# Patient Record
Sex: MALE | Race: WHITE | NOT HISPANIC OR LATINO | Employment: OTHER | URBAN - METROPOLITAN AREA
[De-identification: names, ages, dates, MRNs, and addresses within clinical notes are randomized per-mention and may not be internally consistent; named-entity substitution may affect disease eponyms.]

---

## 2017-02-02 ENCOUNTER — ALLSCRIPTS OFFICE VISIT (OUTPATIENT)
Dept: OTHER | Facility: OTHER | Age: 82
End: 2017-02-02

## 2017-02-02 DIAGNOSIS — E03.9 HYPOTHYROIDISM: ICD-10-CM

## 2017-02-02 DIAGNOSIS — I70.209 ATHEROSCLEROSIS OF NATIVE ARTERY OF EXTREMITY (HCC): ICD-10-CM

## 2017-02-02 DIAGNOSIS — G50.0 TRIGEMINAL NEURALGIA: ICD-10-CM

## 2017-02-02 DIAGNOSIS — D64.9 ANEMIA: ICD-10-CM

## 2017-02-02 DIAGNOSIS — R09.89 OTHER SPECIFIED SYMPTOMS AND SIGNS INVOLVING THE CIRCULATORY AND RESPIRATORY SYSTEMS: ICD-10-CM

## 2017-02-02 DIAGNOSIS — I10 ESSENTIAL (PRIMARY) HYPERTENSION: ICD-10-CM

## 2017-02-02 DIAGNOSIS — R60.9 EDEMA: ICD-10-CM

## 2017-02-02 DIAGNOSIS — Z79.899 OTHER LONG TERM (CURRENT) DRUG THERAPY: ICD-10-CM

## 2017-02-02 DIAGNOSIS — E87.1 HYPO-OSMOLALITY AND HYPONATREMIA: ICD-10-CM

## 2017-02-02 DIAGNOSIS — E11.40 TYPE 2 DIABETES MELLITUS WITH DIABETIC NEUROPATHY (HCC): ICD-10-CM

## 2017-03-01 ENCOUNTER — ALLSCRIPTS OFFICE VISIT (OUTPATIENT)
Dept: OTHER | Facility: OTHER | Age: 82
End: 2017-03-01

## 2017-03-07 ENCOUNTER — ALLSCRIPTS OFFICE VISIT (OUTPATIENT)
Dept: OTHER | Facility: OTHER | Age: 82
End: 2017-03-07

## 2017-03-07 DIAGNOSIS — E03.9 HYPOTHYROIDISM: ICD-10-CM

## 2017-04-13 DIAGNOSIS — E03.9 HYPOTHYROIDISM: ICD-10-CM

## 2017-05-09 ENCOUNTER — ALLSCRIPTS OFFICE VISIT (OUTPATIENT)
Dept: OTHER | Facility: OTHER | Age: 82
End: 2017-05-09

## 2017-05-22 ENCOUNTER — ALLSCRIPTS OFFICE VISIT (OUTPATIENT)
Dept: OTHER | Facility: OTHER | Age: 82
End: 2017-05-22

## 2017-05-22 DIAGNOSIS — E03.9 HYPOTHYROIDISM: ICD-10-CM

## 2017-05-22 DIAGNOSIS — I10 ESSENTIAL (PRIMARY) HYPERTENSION: ICD-10-CM

## 2017-05-22 DIAGNOSIS — D64.9 ANEMIA: ICD-10-CM

## 2017-05-22 DIAGNOSIS — R74.8 ABNORMAL LEVELS OF OTHER SERUM ENZYMES: ICD-10-CM

## 2017-05-22 DIAGNOSIS — N39.46 MIXED INCONTINENCE: ICD-10-CM

## 2017-05-22 DIAGNOSIS — I73.9 PERIPHERAL VASCULAR DISEASE (HCC): ICD-10-CM

## 2017-05-22 DIAGNOSIS — R60.9 EDEMA: ICD-10-CM

## 2017-05-22 DIAGNOSIS — R73.01 IMPAIRED FASTING GLUCOSE: ICD-10-CM

## 2017-05-22 DIAGNOSIS — I70.209 ATHEROSCLEROSIS OF NATIVE ARTERY OF EXTREMITY (HCC): ICD-10-CM

## 2017-05-22 DIAGNOSIS — R15.9 FULL INCONTINENCE OF FECES: ICD-10-CM

## 2017-05-22 DIAGNOSIS — G50.0 TRIGEMINAL NEURALGIA: ICD-10-CM

## 2017-05-31 ENCOUNTER — ALLSCRIPTS OFFICE VISIT (OUTPATIENT)
Dept: OTHER | Facility: OTHER | Age: 82
End: 2017-05-31

## 2017-06-06 ENCOUNTER — GENERIC CONVERSION - ENCOUNTER (OUTPATIENT)
Dept: OTHER | Facility: OTHER | Age: 82
End: 2017-06-06

## 2017-06-06 ENCOUNTER — APPOINTMENT (OUTPATIENT)
Dept: LAB | Facility: CLINIC | Age: 82
End: 2017-06-06
Payer: MEDICARE

## 2017-06-06 DIAGNOSIS — R60.9 EDEMA: ICD-10-CM

## 2017-06-06 DIAGNOSIS — I10 ESSENTIAL (PRIMARY) HYPERTENSION: ICD-10-CM

## 2017-06-06 DIAGNOSIS — G50.0 TRIGEMINAL NEURALGIA: ICD-10-CM

## 2017-06-06 DIAGNOSIS — D64.9 ANEMIA: ICD-10-CM

## 2017-06-06 DIAGNOSIS — R73.01 IMPAIRED FASTING GLUCOSE: ICD-10-CM

## 2017-06-06 DIAGNOSIS — R74.8 ABNORMAL LEVELS OF OTHER SERUM ENZYMES: ICD-10-CM

## 2017-06-06 DIAGNOSIS — N39.46 MIXED INCONTINENCE: ICD-10-CM

## 2017-06-06 DIAGNOSIS — E03.9 HYPOTHYROIDISM: ICD-10-CM

## 2017-06-06 DIAGNOSIS — R15.9 FULL INCONTINENCE OF FECES: ICD-10-CM

## 2017-06-06 DIAGNOSIS — I70.209 ATHEROSCLEROSIS OF NATIVE ARTERY OF EXTREMITY (HCC): ICD-10-CM

## 2017-06-06 DIAGNOSIS — I73.9 PERIPHERAL VASCULAR DISEASE (HCC): ICD-10-CM

## 2017-06-06 LAB
ALBUMIN SERPL BCP-MCNC: 3.9 G/DL (ref 3.5–5)
ALP SERPL-CCNC: 163 U/L (ref 46–116)
ALT SERPL W P-5'-P-CCNC: 24 U/L (ref 12–78)
ANION GAP SERPL CALCULATED.3IONS-SCNC: 6 MMOL/L (ref 4–13)
AST SERPL W P-5'-P-CCNC: 20 U/L (ref 5–45)
BASOPHILS # BLD AUTO: 0.01 THOUSANDS/ΜL (ref 0–0.1)
BASOPHILS NFR BLD AUTO: 0 % (ref 0–1)
BILIRUB SERPL-MCNC: 0.35 MG/DL (ref 0.2–1)
BUN SERPL-MCNC: 33 MG/DL (ref 5–25)
CALCIUM SERPL-MCNC: 8.9 MG/DL (ref 8.3–10.1)
CARBAMAZEPINE SERPL-MCNC: 11.3 UG/ML (ref 4–12)
CHLORIDE SERPL-SCNC: 101 MMOL/L (ref 100–108)
CHOLEST SERPL-MCNC: 198 MG/DL (ref 50–200)
CO2 SERPL-SCNC: 29 MMOL/L (ref 21–32)
CREAT SERPL-MCNC: 1.24 MG/DL (ref 0.6–1.3)
EOSINOPHIL # BLD AUTO: 0.22 THOUSAND/ΜL (ref 0–0.61)
EOSINOPHIL NFR BLD AUTO: 3 % (ref 0–6)
ERYTHROCYTE [DISTWIDTH] IN BLOOD BY AUTOMATED COUNT: 14.1 % (ref 11.6–15.1)
EST. AVERAGE GLUCOSE BLD GHB EST-MCNC: 117 MG/DL
GFR SERPL CREATININE-BSD FRML MDRD: 53.8 ML/MIN/1.73SQ M
GLUCOSE P FAST SERPL-MCNC: 158 MG/DL (ref 65–99)
HBA1C MFR BLD: 5.7 % (ref 4.2–6.3)
HCT VFR BLD AUTO: 36.8 % (ref 36.5–49.3)
HDLC SERPL-MCNC: 51 MG/DL (ref 40–60)
HGB BLD-MCNC: 12.5 G/DL (ref 12–17)
LDLC SERPL CALC-MCNC: 128 MG/DL (ref 0–100)
LYMPHOCYTES # BLD AUTO: 2.35 THOUSANDS/ΜL (ref 0.6–4.47)
LYMPHOCYTES NFR BLD AUTO: 30 % (ref 14–44)
MCH RBC QN AUTO: 30.3 PG (ref 26.8–34.3)
MCHC RBC AUTO-ENTMCNC: 34 G/DL (ref 31.4–37.4)
MCV RBC AUTO: 89 FL (ref 82–98)
MONOCYTES # BLD AUTO: 0.56 THOUSAND/ΜL (ref 0.17–1.22)
MONOCYTES NFR BLD AUTO: 7 % (ref 4–12)
NEUTROPHILS # BLD AUTO: 4.62 THOUSANDS/ΜL (ref 1.85–7.62)
NEUTS SEG NFR BLD AUTO: 60 % (ref 43–75)
NRBC BLD AUTO-RTO: 0 /100 WBCS
PLATELET # BLD AUTO: 124 THOUSANDS/UL (ref 149–390)
PMV BLD AUTO: 11.8 FL (ref 8.9–12.7)
POTASSIUM SERPL-SCNC: 4.8 MMOL/L (ref 3.5–5.3)
PROT SERPL-MCNC: 7.4 G/DL (ref 6.4–8.2)
RBC # BLD AUTO: 4.13 MILLION/UL (ref 3.88–5.62)
SODIUM SERPL-SCNC: 136 MMOL/L (ref 136–145)
T4 FREE SERPL-MCNC: 0.59 NG/DL (ref 0.76–1.46)
TRIGL SERPL-MCNC: 97 MG/DL
TSH SERPL DL<=0.05 MIU/L-ACNC: 6.68 UIU/ML (ref 0.36–3.74)
WBC # BLD AUTO: 7.77 THOUSAND/UL (ref 4.31–10.16)

## 2017-06-06 PROCEDURE — 36415 COLL VENOUS BLD VENIPUNCTURE: CPT

## 2017-06-06 PROCEDURE — 80053 COMPREHEN METABOLIC PANEL: CPT

## 2017-06-06 PROCEDURE — 83036 HEMOGLOBIN GLYCOSYLATED A1C: CPT

## 2017-06-06 PROCEDURE — 85025 COMPLETE CBC W/AUTO DIFF WBC: CPT

## 2017-06-06 PROCEDURE — 84443 ASSAY THYROID STIM HORMONE: CPT

## 2017-06-06 PROCEDURE — 84439 ASSAY OF FREE THYROXINE: CPT

## 2017-06-06 PROCEDURE — 80156 ASSAY CARBAMAZEPINE TOTAL: CPT

## 2017-06-06 PROCEDURE — 80061 LIPID PANEL: CPT

## 2017-06-12 ENCOUNTER — ALLSCRIPTS OFFICE VISIT (OUTPATIENT)
Dept: OTHER | Facility: OTHER | Age: 82
End: 2017-06-12

## 2017-07-11 ENCOUNTER — ALLSCRIPTS OFFICE VISIT (OUTPATIENT)
Dept: OTHER | Facility: OTHER | Age: 82
End: 2017-07-11

## 2017-07-12 ENCOUNTER — APPOINTMENT (OUTPATIENT)
Dept: LAB | Facility: CLINIC | Age: 82
End: 2017-07-12
Payer: MEDICARE

## 2017-07-12 DIAGNOSIS — E03.9 HYPOTHYROIDISM: ICD-10-CM

## 2017-07-12 LAB
T4 FREE SERPL-MCNC: 0.68 NG/DL (ref 0.76–1.46)
TSH SERPL DL<=0.05 MIU/L-ACNC: 3.86 UIU/ML (ref 0.36–3.74)

## 2017-07-12 PROCEDURE — 36415 COLL VENOUS BLD VENIPUNCTURE: CPT

## 2017-07-12 PROCEDURE — 84443 ASSAY THYROID STIM HORMONE: CPT

## 2017-07-12 PROCEDURE — 84439 ASSAY OF FREE THYROXINE: CPT

## 2017-07-13 ENCOUNTER — GENERIC CONVERSION - ENCOUNTER (OUTPATIENT)
Dept: OTHER | Facility: OTHER | Age: 82
End: 2017-07-13

## 2017-08-13 DIAGNOSIS — E03.9 HYPOTHYROIDISM: ICD-10-CM

## 2017-08-23 ENCOUNTER — APPOINTMENT (OUTPATIENT)
Dept: LAB | Facility: CLINIC | Age: 82
End: 2017-08-23
Payer: MEDICARE

## 2017-08-23 ENCOUNTER — GENERIC CONVERSION - ENCOUNTER (OUTPATIENT)
Dept: OTHER | Facility: OTHER | Age: 82
End: 2017-08-23

## 2017-08-23 DIAGNOSIS — E03.9 HYPOTHYROIDISM: ICD-10-CM

## 2017-08-23 LAB
T4 FREE SERPL-MCNC: 0.7 NG/DL (ref 0.76–1.46)
TSH SERPL DL<=0.05 MIU/L-ACNC: 4.71 UIU/ML (ref 0.36–3.74)

## 2017-08-23 PROCEDURE — 36415 COLL VENOUS BLD VENIPUNCTURE: CPT

## 2017-08-23 PROCEDURE — 84443 ASSAY THYROID STIM HORMONE: CPT

## 2017-08-23 PROCEDURE — 84439 ASSAY OF FREE THYROXINE: CPT

## 2017-08-30 ENCOUNTER — APPOINTMENT (EMERGENCY)
Dept: RADIOLOGY | Facility: HOSPITAL | Age: 82
DRG: 981 | End: 2017-08-30
Payer: MEDICARE

## 2017-08-30 ENCOUNTER — HOSPITAL ENCOUNTER (INPATIENT)
Facility: HOSPITAL | Age: 82
LOS: 3 days | Discharge: NURSING, REHAB OR LONG-TERM CARE | DRG: 981 | End: 2017-09-02
Attending: EMERGENCY MEDICINE | Admitting: INTERNAL MEDICINE
Payer: MEDICARE

## 2017-08-30 ENCOUNTER — APPOINTMENT (INPATIENT)
Dept: NON INVASIVE DIAGNOSTICS | Facility: HOSPITAL | Age: 82
DRG: 981 | End: 2017-08-30
Payer: MEDICARE

## 2017-08-30 DIAGNOSIS — S00.432A: ICD-10-CM

## 2017-08-30 DIAGNOSIS — I10 HYPERTENSION: Primary | ICD-10-CM

## 2017-08-30 DIAGNOSIS — S01.01XA SCALP LACERATION: ICD-10-CM

## 2017-08-30 DIAGNOSIS — R29.6 FREQUENT FALLS: ICD-10-CM

## 2017-08-30 DIAGNOSIS — S01.312A: ICD-10-CM

## 2017-08-30 DIAGNOSIS — E87.1 HYPONATREMIA: ICD-10-CM

## 2017-08-30 DIAGNOSIS — R74.8 CARDIAC ENZYMES ELEVATED: ICD-10-CM

## 2017-08-30 DIAGNOSIS — N28.9 RENAL INSUFFICIENCY: ICD-10-CM

## 2017-08-30 LAB
ALBUMIN SERPL BCP-MCNC: 3.7 G/DL (ref 3.5–5)
ALP SERPL-CCNC: 175 U/L (ref 46–116)
ALT SERPL W P-5'-P-CCNC: 30 U/L (ref 12–78)
ANION GAP SERPL CALCULATED.3IONS-SCNC: 8 MMOL/L (ref 4–13)
APTT PPP: 28 SECONDS (ref 23–35)
AST SERPL W P-5'-P-CCNC: 21 U/L (ref 5–45)
BASOPHILS # BLD AUTO: 0 THOUSANDS/ΜL (ref 0–0.1)
BASOPHILS NFR BLD AUTO: 0 % (ref 0–1)
BILIRUB SERPL-MCNC: 0.4 MG/DL (ref 0.2–1)
BILIRUB UR QL STRIP: NEGATIVE
BUN SERPL-MCNC: 26 MG/DL (ref 5–25)
CALCIUM SERPL-MCNC: 8.6 MG/DL (ref 8.3–10.1)
CHLORIDE SERPL-SCNC: 95 MMOL/L (ref 100–108)
CLARITY UR: CLEAR
CO2 SERPL-SCNC: 27 MMOL/L (ref 21–32)
COLOR UR: YELLOW
CREAT SERPL-MCNC: 1.01 MG/DL (ref 0.6–1.3)
EOSINOPHIL # BLD AUTO: 0.1 THOUSAND/ΜL (ref 0–0.61)
EOSINOPHIL NFR BLD AUTO: 2 % (ref 0–6)
ERYTHROCYTE [DISTWIDTH] IN BLOOD BY AUTOMATED COUNT: 13.6 % (ref 11.6–15.1)
GFR SERPL CREATININE-BSD FRML MDRD: 62 ML/MIN/1.73SQ M
GLUCOSE SERPL-MCNC: 167 MG/DL (ref 65–140)
GLUCOSE UR STRIP-MCNC: NEGATIVE MG/DL
HCT VFR BLD AUTO: 39.6 % (ref 42–52)
HGB BLD-MCNC: 13.6 G/DL (ref 14–18)
HGB UR QL STRIP.AUTO: NEGATIVE
INR PPP: 1.08 (ref 0.86–1.16)
KETONES UR STRIP-MCNC: NEGATIVE MG/DL
LEUKOCYTE ESTERASE UR QL STRIP: NEGATIVE
LYMPHOCYTES # BLD AUTO: 1.5 THOUSANDS/ΜL (ref 0.6–4.47)
LYMPHOCYTES NFR BLD AUTO: 20 % (ref 14–44)
MCH RBC QN AUTO: 30.5 PG (ref 27–31)
MCHC RBC AUTO-ENTMCNC: 34.4 G/DL (ref 31.4–37.4)
MCV RBC AUTO: 89 FL (ref 82–98)
MONOCYTES # BLD AUTO: 0.4 THOUSAND/ΜL (ref 0.17–1.22)
MONOCYTES NFR BLD AUTO: 6 % (ref 4–12)
NEUTROPHILS # BLD AUTO: 5.3 THOUSANDS/ΜL (ref 1.85–7.62)
NEUTS SEG NFR BLD AUTO: 72 % (ref 43–75)
NITRITE UR QL STRIP: NEGATIVE
NRBC BLD AUTO-RTO: 0 /100 WBCS
PH UR STRIP.AUTO: 5.5 [PH] (ref 5–9)
PLATELET # BLD AUTO: 133 THOUSANDS/UL (ref 130–400)
PMV BLD AUTO: 7.6 FL (ref 8.9–12.7)
POTASSIUM SERPL-SCNC: 4.8 MMOL/L (ref 3.5–5.3)
PROT SERPL-MCNC: 7.2 G/DL (ref 6.4–8.2)
PROT UR STRIP-MCNC: NEGATIVE MG/DL
PROTHROMBIN TIME: 11.3 SECONDS (ref 9.4–11.7)
RBC # BLD AUTO: 4.48 MILLION/UL (ref 4.7–6.1)
SODIUM SERPL-SCNC: 130 MMOL/L (ref 136–145)
SP GR UR STRIP.AUTO: 1.01 (ref 1–1.03)
TROPONIN I SERPL-MCNC: 0.05 NG/ML
TROPONIN I SERPL-MCNC: 0.05 NG/ML
TROPONIN I SERPL-MCNC: 0.06 NG/ML
TSH SERPL DL<=0.05 MIU/L-ACNC: 3.55 UIU/ML (ref 0.36–3.74)
UROBILINOGEN UR QL STRIP.AUTO: 0.2 E.U./DL
WBC # BLD AUTO: 7.4 THOUSAND/UL (ref 4.8–10.8)

## 2017-08-30 PROCEDURE — 84484 ASSAY OF TROPONIN QUANT: CPT | Performed by: EMERGENCY MEDICINE

## 2017-08-30 PROCEDURE — 0HQ3XZZ REPAIR LEFT EAR SKIN, EXTERNAL APPROACH: ICD-10-PCS | Performed by: EMERGENCY MEDICINE

## 2017-08-30 PROCEDURE — 0JQ00ZZ REPAIR SCALP SUBCUTANEOUS TISSUE AND FASCIA, OPEN APPROACH: ICD-10-PCS | Performed by: EMERGENCY MEDICINE

## 2017-08-30 PROCEDURE — 85730 THROMBOPLASTIN TIME PARTIAL: CPT | Performed by: EMERGENCY MEDICINE

## 2017-08-30 PROCEDURE — 85025 COMPLETE CBC W/AUTO DIFF WBC: CPT | Performed by: EMERGENCY MEDICINE

## 2017-08-30 PROCEDURE — 72125 CT NECK SPINE W/O DYE: CPT

## 2017-08-30 PROCEDURE — G8996 SWALLOW CURRENT STATUS: HCPCS

## 2017-08-30 PROCEDURE — 36415 COLL VENOUS BLD VENIPUNCTURE: CPT | Performed by: EMERGENCY MEDICINE

## 2017-08-30 PROCEDURE — G8997 SWALLOW GOAL STATUS: HCPCS

## 2017-08-30 PROCEDURE — 80053 COMPREHEN METABOLIC PANEL: CPT | Performed by: EMERGENCY MEDICINE

## 2017-08-30 PROCEDURE — 92610 EVALUATE SWALLOWING FUNCTION: CPT

## 2017-08-30 PROCEDURE — 93005 ELECTROCARDIOGRAM TRACING: CPT | Performed by: EMERGENCY MEDICINE

## 2017-08-30 PROCEDURE — 81003 URINALYSIS AUTO W/O SCOPE: CPT | Performed by: EMERGENCY MEDICINE

## 2017-08-30 PROCEDURE — 87081 CULTURE SCREEN ONLY: CPT | Performed by: INTERNAL MEDICINE

## 2017-08-30 PROCEDURE — 84484 ASSAY OF TROPONIN QUANT: CPT | Performed by: INTERNAL MEDICINE

## 2017-08-30 PROCEDURE — 84443 ASSAY THYROID STIM HORMONE: CPT | Performed by: EMERGENCY MEDICINE

## 2017-08-30 PROCEDURE — 99285 EMERGENCY DEPT VISIT HI MDM: CPT

## 2017-08-30 PROCEDURE — 85610 PROTHROMBIN TIME: CPT | Performed by: EMERGENCY MEDICINE

## 2017-08-30 PROCEDURE — 70450 CT HEAD/BRAIN W/O DYE: CPT

## 2017-08-30 RX ORDER — ONDANSETRON 2 MG/ML
4 INJECTION INTRAMUSCULAR; INTRAVENOUS EVERY 6 HOURS PRN
Status: DISCONTINUED | OUTPATIENT
Start: 2017-08-30 | End: 2017-09-02 | Stop reason: HOSPADM

## 2017-08-30 RX ORDER — ASPIRIN 81 MG/1
81 TABLET, CHEWABLE ORAL DAILY
Status: DISCONTINUED | OUTPATIENT
Start: 2017-08-30 | End: 2017-09-02 | Stop reason: HOSPADM

## 2017-08-30 RX ORDER — LISINOPRIL 5 MG/1
5 TABLET ORAL
Status: DISCONTINUED | OUTPATIENT
Start: 2017-08-30 | End: 2017-09-02 | Stop reason: HOSPADM

## 2017-08-30 RX ORDER — ACETAMINOPHEN 325 MG/1
650 TABLET ORAL EVERY 6 HOURS PRN
Status: DISCONTINUED | OUTPATIENT
Start: 2017-08-30 | End: 2017-09-02 | Stop reason: HOSPADM

## 2017-08-30 RX ORDER — ASPIRIN 81 MG/1
TABLET, CHEWABLE ORAL
COMMUNITY

## 2017-08-30 RX ORDER — AMLODIPINE BESYLATE 5 MG/1
5 TABLET ORAL EVERY MORNING
COMMUNITY

## 2017-08-30 RX ORDER — METOPROLOL SUCCINATE 100 MG/1
100 TABLET, EXTENDED RELEASE ORAL DAILY
Status: DISCONTINUED | OUTPATIENT
Start: 2017-08-31 | End: 2017-09-02 | Stop reason: HOSPADM

## 2017-08-30 RX ORDER — METOPROLOL SUCCINATE 100 MG/1
TABLET, EXTENDED RELEASE ORAL
COMMUNITY
Start: 2017-02-02

## 2017-08-30 RX ORDER — AMLODIPINE BESYLATE 5 MG/1
5 TABLET ORAL DAILY
Status: DISCONTINUED | OUTPATIENT
Start: 2017-08-30 | End: 2017-09-02 | Stop reason: HOSPADM

## 2017-08-30 RX ORDER — METOPROLOL SUCCINATE 100 MG/1
100 TABLET, EXTENDED RELEASE ORAL ONCE
Status: COMPLETED | OUTPATIENT
Start: 2017-08-30 | End: 2017-08-30

## 2017-08-30 RX ORDER — HEPARIN SODIUM 5000 [USP'U]/ML
5000 INJECTION, SOLUTION INTRAVENOUS; SUBCUTANEOUS EVERY 12 HOURS SCHEDULED
Status: DISCONTINUED | OUTPATIENT
Start: 2017-08-30 | End: 2017-09-02 | Stop reason: HOSPADM

## 2017-08-30 RX ORDER — CARBAMAZEPINE 200 MG/1
300 TABLET ORAL EVERY 8 HOURS SCHEDULED
Status: DISCONTINUED | OUTPATIENT
Start: 2017-08-30 | End: 2017-09-02 | Stop reason: HOSPADM

## 2017-08-30 RX ORDER — LEVOTHYROXINE SODIUM 137 UG/1
0.07 TABLET ORAL
COMMUNITY
End: 2018-07-24

## 2017-08-30 RX ORDER — POLYETHYLENE GLYCOL 3350 17 G/17G
17 POWDER, FOR SOLUTION ORAL DAILY PRN
Status: DISCONTINUED | OUTPATIENT
Start: 2017-08-30 | End: 2017-09-02 | Stop reason: HOSPADM

## 2017-08-30 RX ADMIN — HEPARIN SODIUM 5000 UNITS: 5000 INJECTION, SOLUTION INTRAVENOUS; SUBCUTANEOUS at 10:40

## 2017-08-30 RX ADMIN — LISINOPRIL 5 MG: 5 TABLET ORAL at 22:16

## 2017-08-30 RX ADMIN — ASPIRIN 81 MG 81 MG: 81 TABLET ORAL at 12:17

## 2017-08-30 RX ADMIN — Medication 1 APPLICATION: at 06:39

## 2017-08-30 RX ADMIN — METOPROLOL SUCCINATE 100 MG: 100 TABLET, FILM COATED, EXTENDED RELEASE ORAL at 08:07

## 2017-08-30 RX ADMIN — HEPARIN SODIUM 5000 UNITS: 5000 INJECTION, SOLUTION INTRAVENOUS; SUBCUTANEOUS at 22:16

## 2017-08-30 RX ADMIN — CARBAMAZEPINE 300 MG: 200 TABLET ORAL at 14:09

## 2017-08-30 RX ADMIN — CARBAMAZEPINE 300 MG: 200 TABLET ORAL at 22:16

## 2017-08-30 RX ADMIN — AMLODIPINE BESYLATE 5 MG: 5 TABLET ORAL at 10:40

## 2017-08-31 ENCOUNTER — APPOINTMENT (INPATIENT)
Dept: OCCUPATIONAL THERAPY | Facility: HOSPITAL | Age: 82
DRG: 981 | End: 2017-08-31
Payer: MEDICARE

## 2017-08-31 ENCOUNTER — APPOINTMENT (INPATIENT)
Dept: NON INVASIVE DIAGNOSTICS | Facility: HOSPITAL | Age: 82
DRG: 981 | End: 2017-08-31
Payer: MEDICARE

## 2017-08-31 LAB
ALBUMIN SERPL BCP-MCNC: 3.6 G/DL (ref 3.5–5)
ALP SERPL-CCNC: 167 U/L (ref 46–116)
ALT SERPL W P-5'-P-CCNC: 25 U/L (ref 12–78)
ANION GAP SERPL CALCULATED.3IONS-SCNC: 9 MMOL/L (ref 4–13)
AST SERPL W P-5'-P-CCNC: 18 U/L (ref 5–45)
ATRIAL RATE: 70 BPM
BASOPHILS # BLD AUTO: 0 THOUSANDS/ΜL (ref 0–0.1)
BASOPHILS NFR BLD AUTO: 0 % (ref 0–1)
BILIRUB SERPL-MCNC: 0.5 MG/DL (ref 0.2–1)
BUN SERPL-MCNC: 26 MG/DL (ref 5–25)
CALCIUM SERPL-MCNC: 8.4 MG/DL (ref 8.3–10.1)
CHLORIDE SERPL-SCNC: 94 MMOL/L (ref 100–108)
CHOLEST SERPL-MCNC: 211 MG/DL (ref 50–200)
CO2 SERPL-SCNC: 25 MMOL/L (ref 21–32)
CREAT SERPL-MCNC: 0.99 MG/DL (ref 0.6–1.3)
EOSINOPHIL # BLD AUTO: 0.2 THOUSAND/ΜL (ref 0–0.61)
EOSINOPHIL NFR BLD AUTO: 3 % (ref 0–6)
ERYTHROCYTE [DISTWIDTH] IN BLOOD BY AUTOMATED COUNT: 13.7 % (ref 11.6–15.1)
GFR SERPL CREATININE-BSD FRML MDRD: 63 ML/MIN/1.73SQ M
GLUCOSE SERPL-MCNC: 162 MG/DL (ref 65–140)
HCT VFR BLD AUTO: 39.2 % (ref 42–52)
HDLC SERPL-MCNC: 53 MG/DL (ref 40–60)
HGB BLD-MCNC: 13.3 G/DL (ref 14–18)
LDLC SERPL CALC-MCNC: 133 MG/DL (ref 0–100)
LYMPHOCYTES # BLD AUTO: 2 THOUSANDS/ΜL (ref 0.6–4.47)
LYMPHOCYTES NFR BLD AUTO: 26 % (ref 14–44)
MAGNESIUM SERPL-MCNC: 2 MG/DL (ref 1.6–2.6)
MCH RBC QN AUTO: 30.5 PG (ref 27–31)
MCHC RBC AUTO-ENTMCNC: 34 G/DL (ref 31.4–37.4)
MCV RBC AUTO: 89 FL (ref 82–98)
MONOCYTES # BLD AUTO: 0.4 THOUSAND/ΜL (ref 0.17–1.22)
MONOCYTES NFR BLD AUTO: 6 % (ref 4–12)
MRSA NOSE QL CULT: NORMAL
NEUTROPHILS # BLD AUTO: 4.9 THOUSANDS/ΜL (ref 1.85–7.62)
NEUTS SEG NFR BLD AUTO: 65 % (ref 43–75)
NRBC BLD AUTO-RTO: 0 /100 WBCS
OSMOLALITY UR: 455 MMOL/KG
PLATELET # BLD AUTO: 137 THOUSANDS/UL (ref 130–400)
PMV BLD AUTO: 9.4 FL (ref 8.9–12.7)
POTASSIUM SERPL-SCNC: 4.8 MMOL/L (ref 3.5–5.3)
PR INTERVAL: 242 MS
PROT SERPL-MCNC: 7 G/DL (ref 6.4–8.2)
QRS AXIS: -71 DEGREES
QRSD INTERVAL: 180 MS
QT INTERVAL: 496 MS
QTC INTERVAL: 535 MS
RBC # BLD AUTO: 4.38 MILLION/UL (ref 4.7–6.1)
SODIUM 24H UR-SCNC: 65 MOL/L
SODIUM SERPL-SCNC: 128 MMOL/L (ref 136–145)
T WAVE AXIS: 110 DEGREES
TRIGL SERPL-MCNC: 124 MG/DL
VENTRICULAR RATE: 70 BPM
VIT B12 SERPL-MCNC: 930 PG/ML (ref 100–900)
WBC # BLD AUTO: 7.5 THOUSAND/UL (ref 4.8–10.8)

## 2017-08-31 PROCEDURE — 84300 ASSAY OF URINE SODIUM: CPT | Performed by: INTERNAL MEDICINE

## 2017-08-31 PROCEDURE — 80061 LIPID PANEL: CPT | Performed by: INTERNAL MEDICINE

## 2017-08-31 PROCEDURE — G8979 MOBILITY GOAL STATUS: HCPCS

## 2017-08-31 PROCEDURE — 83935 ASSAY OF URINE OSMOLALITY: CPT | Performed by: INTERNAL MEDICINE

## 2017-08-31 PROCEDURE — 82607 VITAMIN B-12: CPT | Performed by: INTERNAL MEDICINE

## 2017-08-31 PROCEDURE — G8978 MOBILITY CURRENT STATUS: HCPCS

## 2017-08-31 PROCEDURE — 97167 OT EVAL HIGH COMPLEX 60 MIN: CPT

## 2017-08-31 PROCEDURE — 80157 ASSAY CARBAMAZEPINE FREE: CPT | Performed by: INTERNAL MEDICINE

## 2017-08-31 PROCEDURE — 80053 COMPREHEN METABOLIC PANEL: CPT | Performed by: INTERNAL MEDICINE

## 2017-08-31 PROCEDURE — 85025 COMPLETE CBC W/AUTO DIFF WBC: CPT | Performed by: INTERNAL MEDICINE

## 2017-08-31 PROCEDURE — 97163 PT EVAL HIGH COMPLEX 45 MIN: CPT

## 2017-08-31 PROCEDURE — G8988 SELF CARE GOAL STATUS: HCPCS

## 2017-08-31 PROCEDURE — G8987 SELF CARE CURRENT STATUS: HCPCS

## 2017-08-31 PROCEDURE — 83735 ASSAY OF MAGNESIUM: CPT | Performed by: INTERNAL MEDICINE

## 2017-08-31 PROCEDURE — 93306 TTE W/DOPPLER COMPLETE: CPT

## 2017-08-31 RX ADMIN — LEVOTHYROXINE SODIUM 137 MCG: 112 TABLET ORAL at 06:58

## 2017-08-31 RX ADMIN — HEPARIN SODIUM 5000 UNITS: 5000 INJECTION, SOLUTION INTRAVENOUS; SUBCUTANEOUS at 21:49

## 2017-08-31 RX ADMIN — CARBAMAZEPINE 300 MG: 200 TABLET ORAL at 21:49

## 2017-08-31 RX ADMIN — ASPIRIN 81 MG 81 MG: 81 TABLET ORAL at 08:32

## 2017-08-31 RX ADMIN — METOPROLOL SUCCINATE 100 MG: 100 TABLET, FILM COATED, EXTENDED RELEASE ORAL at 08:32

## 2017-08-31 RX ADMIN — AMLODIPINE BESYLATE 5 MG: 5 TABLET ORAL at 08:31

## 2017-08-31 RX ADMIN — LISINOPRIL 5 MG: 5 TABLET ORAL at 21:49

## 2017-08-31 RX ADMIN — HEPARIN SODIUM 5000 UNITS: 5000 INJECTION, SOLUTION INTRAVENOUS; SUBCUTANEOUS at 08:32

## 2017-08-31 RX ADMIN — CARBAMAZEPINE 300 MG: 200 TABLET ORAL at 06:56

## 2017-08-31 RX ADMIN — CARBAMAZEPINE 300 MG: 200 TABLET ORAL at 14:43

## 2017-09-01 LAB
ANION GAP SERPL CALCULATED.3IONS-SCNC: 9 MMOL/L (ref 4–13)
BUN SERPL-MCNC: 28 MG/DL (ref 5–25)
CALCIUM SERPL-MCNC: 8.4 MG/DL (ref 8.3–10.1)
CARBAMAZEPINE FREE SERPL-MCNC: 2.5 UG/ML (ref 0.6–4.2)
CHLORIDE SERPL-SCNC: 94 MMOL/L (ref 100–108)
CO2 SERPL-SCNC: 25 MMOL/L (ref 21–32)
CORTIS SERPL-MCNC: 21.3 UG/DL
CREAT SERPL-MCNC: 0.94 MG/DL (ref 0.6–1.3)
GFR SERPL CREATININE-BSD FRML MDRD: 67 ML/MIN/1.73SQ M
GLUCOSE SERPL-MCNC: 128 MG/DL (ref 65–140)
OSMOLALITY UR/SERPL-RTO: 278 MMOL/KG (ref 282–298)
POTASSIUM SERPL-SCNC: 4.6 MMOL/L (ref 3.5–5.3)
SODIUM SERPL-SCNC: 128 MMOL/L (ref 136–145)
URATE SERPL-MCNC: 5.4 MG/DL (ref 4.2–8)

## 2017-09-01 PROCEDURE — 83930 ASSAY OF BLOOD OSMOLALITY: CPT | Performed by: INTERNAL MEDICINE

## 2017-09-01 PROCEDURE — 80048 BASIC METABOLIC PNL TOTAL CA: CPT | Performed by: INTERNAL MEDICINE

## 2017-09-01 PROCEDURE — 82533 TOTAL CORTISOL: CPT | Performed by: INTERNAL MEDICINE

## 2017-09-01 PROCEDURE — 84550 ASSAY OF BLOOD/URIC ACID: CPT | Performed by: INTERNAL MEDICINE

## 2017-09-01 RX ORDER — SODIUM CHLORIDE 1000 MG
1 TABLET, SOLUBLE MISCELLANEOUS
Status: DISCONTINUED | OUTPATIENT
Start: 2017-09-01 | End: 2017-09-02

## 2017-09-01 RX ORDER — TORSEMIDE 10 MG/1
10 TABLET ORAL DAILY
Status: DISCONTINUED | OUTPATIENT
Start: 2017-09-01 | End: 2017-09-02 | Stop reason: HOSPADM

## 2017-09-01 RX ADMIN — AMLODIPINE BESYLATE 5 MG: 5 TABLET ORAL at 09:16

## 2017-09-01 RX ADMIN — CARBAMAZEPINE 300 MG: 200 TABLET ORAL at 14:42

## 2017-09-01 RX ADMIN — TORSEMIDE 10 MG: 10 TABLET ORAL at 11:44

## 2017-09-01 RX ADMIN — HEPARIN SODIUM 5000 UNITS: 5000 INJECTION, SOLUTION INTRAVENOUS; SUBCUTANEOUS at 09:16

## 2017-09-01 RX ADMIN — CARBAMAZEPINE 300 MG: 200 TABLET ORAL at 21:49

## 2017-09-01 RX ADMIN — ASPIRIN 81 MG 81 MG: 81 TABLET ORAL at 09:16

## 2017-09-01 RX ADMIN — SODIUM CHLORIDE TAB 1 GM 1 G: 1 TAB at 11:44

## 2017-09-01 RX ADMIN — SODIUM CHLORIDE TAB 1 GM 1 G: 1 TAB at 16:53

## 2017-09-01 RX ADMIN — LEVOTHYROXINE SODIUM 137 MCG: 112 TABLET ORAL at 06:26

## 2017-09-01 RX ADMIN — METOPROLOL SUCCINATE 100 MG: 100 TABLET, FILM COATED, EXTENDED RELEASE ORAL at 09:16

## 2017-09-01 RX ADMIN — HEPARIN SODIUM 5000 UNITS: 5000 INJECTION, SOLUTION INTRAVENOUS; SUBCUTANEOUS at 21:49

## 2017-09-01 RX ADMIN — LISINOPRIL 5 MG: 5 TABLET ORAL at 21:47

## 2017-09-01 RX ADMIN — CARBAMAZEPINE 300 MG: 200 TABLET ORAL at 06:26

## 2017-09-02 VITALS
WEIGHT: 134.5 LBS | BODY MASS INDEX: 24.75 KG/M2 | OXYGEN SATURATION: 96 % | HEIGHT: 62 IN | TEMPERATURE: 97.5 F | DIASTOLIC BLOOD PRESSURE: 76 MMHG | RESPIRATION RATE: 18 BRPM | SYSTOLIC BLOOD PRESSURE: 168 MMHG | HEART RATE: 79 BPM

## 2017-09-02 LAB
ANION GAP SERPL CALCULATED.3IONS-SCNC: 9 MMOL/L (ref 4–13)
BUN SERPL-MCNC: 35 MG/DL (ref 5–25)
CALCIUM SERPL-MCNC: 8.5 MG/DL (ref 8.3–10.1)
CHLORIDE SERPL-SCNC: 96 MMOL/L (ref 100–108)
CO2 SERPL-SCNC: 27 MMOL/L (ref 21–32)
CREAT SERPL-MCNC: 1.13 MG/DL (ref 0.6–1.3)
GFR SERPL CREATININE-BSD FRML MDRD: 54 ML/MIN/1.73SQ M
GLUCOSE SERPL-MCNC: 130 MG/DL (ref 65–140)
POTASSIUM SERPL-SCNC: 4.4 MMOL/L (ref 3.5–5.3)
SODIUM SERPL-SCNC: 132 MMOL/L (ref 136–145)

## 2017-09-02 PROCEDURE — 80048 BASIC METABOLIC PNL TOTAL CA: CPT | Performed by: INTERNAL MEDICINE

## 2017-09-02 RX ORDER — TORSEMIDE 10 MG/1
10 TABLET ORAL DAILY
Refills: 0
Start: 2017-09-02 | End: 2017-11-23

## 2017-09-02 RX ORDER — POLYETHYLENE GLYCOL 3350 17 G/17G
17 POWDER, FOR SOLUTION ORAL DAILY PRN
Qty: 14 EACH | Refills: 0
Start: 2017-09-02

## 2017-09-02 RX ORDER — SODIUM CHLORIDE 1000 MG
1 TABLET, SOLUBLE MISCELLANEOUS 2 TIMES DAILY WITH MEALS
Status: DISCONTINUED | OUTPATIENT
Start: 2017-09-02 | End: 2017-09-02 | Stop reason: HOSPADM

## 2017-09-02 RX ORDER — SODIUM CHLORIDE 1000 MG
1 TABLET, SOLUBLE MISCELLANEOUS 2 TIMES DAILY WITH MEALS
Refills: 0
Start: 2017-09-02 | End: 2017-11-23

## 2017-09-02 RX ADMIN — METOPROLOL SUCCINATE 100 MG: 100 TABLET, FILM COATED, EXTENDED RELEASE ORAL at 08:57

## 2017-09-02 RX ADMIN — TORSEMIDE 10 MG: 10 TABLET ORAL at 08:57

## 2017-09-02 RX ADMIN — SODIUM CHLORIDE TAB 1 GM 1 G: 1 TAB at 08:57

## 2017-09-02 RX ADMIN — HEPARIN SODIUM 5000 UNITS: 5000 INJECTION, SOLUTION INTRAVENOUS; SUBCUTANEOUS at 08:57

## 2017-09-02 RX ADMIN — CARBAMAZEPINE 300 MG: 200 TABLET ORAL at 05:27

## 2017-09-02 RX ADMIN — LEVOTHYROXINE SODIUM 137 MCG: 112 TABLET ORAL at 05:27

## 2017-09-02 RX ADMIN — AMLODIPINE BESYLATE 5 MG: 5 TABLET ORAL at 08:56

## 2017-09-02 RX ADMIN — ASPIRIN 81 MG 81 MG: 81 TABLET ORAL at 08:57

## 2017-09-05 ENCOUNTER — GENERIC CONVERSION - ENCOUNTER (OUTPATIENT)
Dept: OTHER | Facility: OTHER | Age: 82
End: 2017-09-05

## 2017-09-22 ENCOUNTER — GENERIC CONVERSION - ENCOUNTER (OUTPATIENT)
Dept: OTHER | Facility: OTHER | Age: 82
End: 2017-09-22

## 2017-11-23 ENCOUNTER — HOSPITAL ENCOUNTER (INPATIENT)
Facility: HOSPITAL | Age: 82
LOS: 6 days | Discharge: NURSING, REHAB OR LONG-TERM CARE | DRG: 480 | End: 2017-11-29
Attending: EMERGENCY MEDICINE | Admitting: INTERNAL MEDICINE
Payer: MEDICARE

## 2017-11-23 ENCOUNTER — APPOINTMENT (INPATIENT)
Dept: RADIOLOGY | Facility: HOSPITAL | Age: 82
DRG: 480 | End: 2017-11-23
Payer: MEDICARE

## 2017-11-23 ENCOUNTER — APPOINTMENT (EMERGENCY)
Dept: RADIOLOGY | Facility: HOSPITAL | Age: 82
DRG: 480 | End: 2017-11-23
Payer: MEDICARE

## 2017-11-23 DIAGNOSIS — S72.001A CLOSED FRACTURE OF RIGHT HIP, INITIAL ENCOUNTER (HCC): Primary | ICD-10-CM

## 2017-11-23 PROBLEM — M79.606 LEG PAIN: Status: ACTIVE | Noted: 2017-11-23

## 2017-11-23 LAB
ANION GAP SERPL CALCULATED.3IONS-SCNC: 9 MMOL/L (ref 4–13)
APTT PPP: 24 SECONDS (ref 24–33)
BASOPHILS # BLD AUTO: 0 THOUSANDS/ΜL (ref 0–0.1)
BASOPHILS NFR BLD AUTO: 0 % (ref 0–1)
BUN SERPL-MCNC: 33 MG/DL (ref 5–25)
CALCIUM SERPL-MCNC: 8.7 MG/DL (ref 8.3–10.1)
CHLORIDE SERPL-SCNC: 101 MMOL/L (ref 100–108)
CO2 SERPL-SCNC: 28 MMOL/L (ref 21–32)
CREAT SERPL-MCNC: 1.33 MG/DL (ref 0.6–1.3)
EOSINOPHIL # BLD AUTO: 0.3 THOUSAND/ΜL (ref 0–0.61)
EOSINOPHIL NFR BLD AUTO: 3 % (ref 0–6)
ERYTHROCYTE [DISTWIDTH] IN BLOOD BY AUTOMATED COUNT: 14.2 % (ref 11.6–15.1)
GFR SERPL CREATININE-BSD FRML MDRD: 44 ML/MIN/1.73SQ M
GLUCOSE SERPL-MCNC: 216 MG/DL (ref 65–140)
GLUCOSE SERPL-MCNC: 266 MG/DL (ref 65–140)
HCT VFR BLD AUTO: 34.8 % (ref 42–52)
HGB BLD-MCNC: 11.7 G/DL (ref 14–18)
INR PPP: 1.02 (ref 0.86–1.16)
LYMPHOCYTES # BLD AUTO: 1.5 THOUSANDS/ΜL (ref 0.6–4.47)
LYMPHOCYTES NFR BLD AUTO: 19 % (ref 14–44)
MCH RBC QN AUTO: 29.9 PG (ref 27–31)
MCHC RBC AUTO-ENTMCNC: 33.6 G/DL (ref 31.4–37.4)
MCV RBC AUTO: 89 FL (ref 82–98)
MONOCYTES # BLD AUTO: 0.4 THOUSAND/ΜL (ref 0.17–1.22)
MONOCYTES NFR BLD AUTO: 5 % (ref 4–12)
NEUTROPHILS # BLD AUTO: 5.7 THOUSANDS/ΜL (ref 1.85–7.62)
NEUTS SEG NFR BLD AUTO: 73 % (ref 43–75)
NRBC BLD AUTO-RTO: 0 /100 WBCS
PLATELET # BLD AUTO: 153 THOUSANDS/UL (ref 130–400)
PMV BLD AUTO: 7.9 FL (ref 8.9–12.7)
POTASSIUM SERPL-SCNC: 4.5 MMOL/L (ref 3.5–5.3)
PROTHROMBIN TIME: 10.7 SECONDS (ref 9.4–11.7)
RBC # BLD AUTO: 3.91 MILLION/UL (ref 4.7–6.1)
SODIUM SERPL-SCNC: 138 MMOL/L (ref 136–145)
WBC # BLD AUTO: 7.9 THOUSAND/UL (ref 4.8–10.8)

## 2017-11-23 PROCEDURE — 82948 REAGENT STRIP/BLOOD GLUCOSE: CPT

## 2017-11-23 PROCEDURE — 85610 PROTHROMBIN TIME: CPT | Performed by: EMERGENCY MEDICINE

## 2017-11-23 PROCEDURE — 99285 EMERGENCY DEPT VISIT HI MDM: CPT

## 2017-11-23 PROCEDURE — 36415 COLL VENOUS BLD VENIPUNCTURE: CPT | Performed by: EMERGENCY MEDICINE

## 2017-11-23 PROCEDURE — 85730 THROMBOPLASTIN TIME PARTIAL: CPT | Performed by: EMERGENCY MEDICINE

## 2017-11-23 PROCEDURE — 80048 BASIC METABOLIC PNL TOTAL CA: CPT | Performed by: EMERGENCY MEDICINE

## 2017-11-23 PROCEDURE — 73560 X-RAY EXAM OF KNEE 1 OR 2: CPT

## 2017-11-23 PROCEDURE — 87081 CULTURE SCREEN ONLY: CPT | Performed by: INTERNAL MEDICINE

## 2017-11-23 PROCEDURE — 96374 THER/PROPH/DIAG INJ IV PUSH: CPT

## 2017-11-23 PROCEDURE — 96361 HYDRATE IV INFUSION ADD-ON: CPT

## 2017-11-23 PROCEDURE — 85025 COMPLETE CBC W/AUTO DIFF WBC: CPT | Performed by: EMERGENCY MEDICINE

## 2017-11-23 PROCEDURE — 71010 HB CHEST X-RAY 1 VIEW FRONTAL: CPT

## 2017-11-23 PROCEDURE — 73502 X-RAY EXAM HIP UNI 2-3 VIEWS: CPT

## 2017-11-23 PROCEDURE — 70450 CT HEAD/BRAIN W/O DYE: CPT

## 2017-11-23 PROCEDURE — 72125 CT NECK SPINE W/O DYE: CPT

## 2017-11-23 RX ORDER — ASPIRIN 81 MG/1
81 TABLET, CHEWABLE ORAL DAILY
Status: DISCONTINUED | OUTPATIENT
Start: 2017-11-24 | End: 2017-11-29 | Stop reason: HOSPADM

## 2017-11-23 RX ORDER — SODIUM CHLORIDE 9 MG/ML
50 INJECTION, SOLUTION INTRAVENOUS CONTINUOUS
Status: DISCONTINUED | OUTPATIENT
Start: 2017-11-23 | End: 2017-11-23

## 2017-11-23 RX ORDER — ACETAMINOPHEN 325 MG/1
650 TABLET ORAL EVERY 6 HOURS PRN
Status: DISCONTINUED | OUTPATIENT
Start: 2017-11-23 | End: 2017-11-25

## 2017-11-23 RX ORDER — POLYETHYLENE GLYCOL 3350 17 G/17G
17 POWDER, FOR SOLUTION ORAL DAILY PRN
Status: DISCONTINUED | OUTPATIENT
Start: 2017-11-23 | End: 2017-11-29 | Stop reason: HOSPADM

## 2017-11-23 RX ORDER — SODIUM CHLORIDE 9 MG/ML
75 INJECTION, SOLUTION INTRAVENOUS CONTINUOUS
Status: DISCONTINUED | OUTPATIENT
Start: 2017-11-23 | End: 2017-11-25

## 2017-11-23 RX ORDER — MORPHINE SULFATE 4 MG/ML
4 INJECTION, SOLUTION INTRAMUSCULAR; INTRAVENOUS ONCE
Status: COMPLETED | OUTPATIENT
Start: 2017-11-23 | End: 2017-11-23

## 2017-11-23 RX ORDER — CARBAMAZEPINE 200 MG/1
300 TABLET ORAL 3 TIMES DAILY
Status: DISCONTINUED | OUTPATIENT
Start: 2017-11-23 | End: 2017-11-29 | Stop reason: HOSPADM

## 2017-11-23 RX ORDER — ONDANSETRON 2 MG/ML
4 INJECTION INTRAMUSCULAR; INTRAVENOUS EVERY 6 HOURS PRN
Status: DISCONTINUED | OUTPATIENT
Start: 2017-11-23 | End: 2017-11-29 | Stop reason: HOSPADM

## 2017-11-23 RX ORDER — POLYETHYLENE GLYCOL 3350 17 G/17G
17 POWDER, FOR SOLUTION ORAL DAILY PRN
Status: DISCONTINUED | OUTPATIENT
Start: 2017-11-23 | End: 2017-11-23

## 2017-11-23 RX ORDER — HEPARIN SODIUM 5000 [USP'U]/ML
5000 INJECTION, SOLUTION INTRAVENOUS; SUBCUTANEOUS EVERY 8 HOURS SCHEDULED
Status: DISCONTINUED | OUTPATIENT
Start: 2017-11-23 | End: 2017-11-24

## 2017-11-23 RX ORDER — METOPROLOL SUCCINATE 100 MG/1
100 TABLET, EXTENDED RELEASE ORAL DAILY
Status: DISCONTINUED | OUTPATIENT
Start: 2017-11-24 | End: 2017-11-24

## 2017-11-23 RX ORDER — LISINOPRIL 5 MG/1
5 TABLET ORAL
Status: DISCONTINUED | OUTPATIENT
Start: 2017-11-23 | End: 2017-11-24

## 2017-11-23 RX ORDER — DOCUSATE SODIUM 100 MG/1
100 CAPSULE, LIQUID FILLED ORAL 2 TIMES DAILY
Status: DISCONTINUED | OUTPATIENT
Start: 2017-11-23 | End: 2017-11-29 | Stop reason: HOSPADM

## 2017-11-23 RX ORDER — LEVOTHYROXINE SODIUM 0.07 MG/1
75 TABLET ORAL
Status: DISCONTINUED | OUTPATIENT
Start: 2017-11-24 | End: 2017-11-29 | Stop reason: HOSPADM

## 2017-11-23 RX ORDER — AMLODIPINE BESYLATE 5 MG/1
5 TABLET ORAL DAILY
Status: DISCONTINUED | OUTPATIENT
Start: 2017-11-24 | End: 2017-11-24

## 2017-11-23 RX ADMIN — LISINOPRIL 5 MG: 5 TABLET ORAL at 22:05

## 2017-11-23 RX ADMIN — CARBAMAZEPINE 300 MG: 200 TABLET ORAL at 22:05

## 2017-11-23 RX ADMIN — SODIUM CHLORIDE 500 ML: 0.9 INJECTION, SOLUTION INTRAVENOUS at 15:46

## 2017-11-23 RX ADMIN — MORPHINE SULFATE 4 MG: 4 INJECTION, SOLUTION INTRAMUSCULAR; INTRAVENOUS at 15:46

## 2017-11-23 RX ADMIN — HEPARIN SODIUM 5000 UNITS: 5000 INJECTION, SOLUTION INTRAVENOUS; SUBCUTANEOUS at 22:06

## 2017-11-23 RX ADMIN — SODIUM CHLORIDE 50 ML/HR: 0.9 INJECTION, SOLUTION INTRAVENOUS at 20:52

## 2017-11-23 RX ADMIN — DOCUSATE SODIUM 100 MG: 100 CAPSULE, LIQUID FILLED ORAL at 18:42

## 2017-11-23 NOTE — H&P
History and Physical - [facility]    Patient Information: Yue Pace 80 y o  male MRN: [id number]  Unit/Bed#: [address] [place] [address] Encounter: [id number]  Admitting Physician: Antonio Kiran MD  PCP: Sera Mcgregor DO  Date of Admission:  11/23/17        Hospital Problem List:     Principal Problem:    Closed fracture of right hip [facility])  Active Problems:    Frequent falls    Leg pain    Cardiac disease    Hypertension    Disease of thyroid gland    Diabetes mellitus ([facility] [address] )    Arthritis      Assessment/Plan:    1  Right intertrochanteric fracture-discussed with patient and family that patient will need surgical intervention  Will keep NPO after midnight  Pain control  Orthopedic evaluation, discussed with Dr Luis Dunn  Cardiology evaluation for perioperative management  2  Status post fall, likely mechanical; history of recurrent fall-monitor blood pressure  Check EKG  Monitor blood pressure  Prior Echo and carotid Doppler noted  Telemetry  3  Uncontrolled hypertension-likely secondary to pain  Continue pain control  Resume home medication including Norvasc metoprolol and lisinopril  Monitor blood pressure  4  Acute kidney injury on CKD stage 3 (baseline creatinine 0 9-1 1)-monitor renal function  5  History of impaired glucose tolerance-blood sugar noted to be elevated at the time of presentation likely stress response  Will check Accu-Chek, hemoglobin A1c  Monitor  6  Hypothyroidism-on Synthroid  7  History of trigeminal neuralgia, intractable-on carbamazepine  8  History of complete heart block status post pacemaker placement  9  History of SIADH-sodium level within normal limit  Will continue to monitor  Avoid hypotonic solution  Fluid restriction  10   History of colon cancer status post colectomy in remission      VTE Prophylaxis: Heparin  / sequential compression device   Code Status: Level 1 - Full Code    Anticipated Length of Stay:  Patient will be admitted on an Inpatient basis with an anticipated length of stay of  > 2 midnights  Justification for Hospital Stay:   Right hip fracture requiring surgical intervention   Total Time for Visit, including Counseling / Coordination of Care: 45 minutes  Greater than 50% of this total time spent on direct patient counseling and coordination of care  Discussed with patient, daughter at bedside    Chief Complaint:     Leg Pain (per EMS report, pt getting out of his chair to get his walker & fell  c/o right upper leg pain  fall occurred approx 1/2 hour ago  states he fell onto his right side , states hit shoulder , "unsure of head strike  denies LOC/denies head/neck/back pain  )    of Present Illness:    Ijeoma Vaz is a 80 y o  male with past medical history of trigeminal neuralgia on carbamazepine and botulism toxin injection, complete heart block status post pacemaker placement, hypertension, impaired glucose tolerance, hypothyroidism, colon cancer status post colectomy, frequent fall, hyponatremia secondary to carbamazepine, arthritis who presents after a fall at home  Patient lives at assisted living facility and was visiting his family today  He fell while trying to ambulate with walker after getting up, he lost his balance and fell on his right side hitting his right side of the head and hip  Patient subsequently developed a right thigh pain and could not be bear weight  Patient denied any loss of consciousness, headache, neck pain, palpitation, chest pain, shortness of breath but felt that he may have felt dizzy prior to the episode  Patient does have a history of frequent fall and his balance has not been so good  Patient was subsequently brought to emergency room for further evaluation & workup  In ED, patient was noted to have stable vital signs  CT of the head and C-spine did not reveal any abnormalities    X-ray of the right hip revealed right intertrochanteric fracture and patient was subsequently admitted for further evaluation treatment  Patient currently reports mild pain around right hip  Denies any headache, dizziness, chest pain, palpitation, shortness of breath, any tingling numbness or weakness of the leg  Review of Systems:    Review of Systems   Constitutional: Negative for chills, diaphoresis, fatigue and unexpected weight change  HENT: Positive for hearing loss  Negative for congestion, mouth sores, nosebleeds, sinus pressure, sneezing, sore throat, tinnitus, trouble swallowing and voice change  Eyes: Negative for photophobia, discharge, redness and visual disturbance  Respiratory: Negative for cough, chest tightness, shortness of breath, wheezing and stridor  Cardiovascular: Negative for chest pain, palpitations and leg swelling  Gastrointestinal: Negative for abdominal distention, abdominal pain, diarrhea, nausea and vomiting  Genitourinary: Negative for difficulty urinating and dysuria  Musculoskeletal: Positive for arthralgias and gait problem  Negative for back pain, neck pain and neck stiffness  Skin: Positive for color change (Ecchymosis on right elbow)  Negative for pallor and rash  Neurological: Positive for dizziness  Negative for seizures, syncope, facial asymmetry, speech difficulty, weakness, light-headedness, numbness and headaches  Hematological: Negative for adenopathy  Does not bruise/bleed easily  Psychiatric/Behavioral: Negative for agitation and confusion         Past Medical and Surgical History:     Past Medical History:   Diagnosis Date    Arthritis     Cancer [facility])     colon    Cardiac disease     pacemaker    Diabetes mellitus ([facility] [address] )     Disease of thyroid gland     Hypertension     Trigeminal neuralgia        Past Surgical History:   Procedure Laterality Date    APPENDECTOMY      CARDIAC PACEMAKER PLACEMENT  2012    CATARACT EXTRACTION      COLON SURGERY  2013    HERNIA REPAIR      SKIN GRAFT         Meds/Allergies:    PTA meds:   Prior to Admission Medications   Prescriptions Last Dose Informant Patient Reported? Taking? CARBAMAZEPINE PO Unknown at Unknown time  Yes No   Sig: Take 300 mg by mouth 3 (three) times a day     LISINOPRIL PO Unknown at Unknown time  Yes No   Sig: Take 5 mg by mouth daily at bedtime     amLODIPine (NORVASC) 5 mg tablet Unknown at Unknown time  Yes No   Sig: Take 5 mg by mouth   aspirin 81 mg chewable tablet Unknown at Unknown time  Yes No   Sig: Chew   levothyroxine 137 mcg tablet Unknown at Unknown time  Yes No   Si 075 mg     metoprolol succinate (TOPROL-XL) 100 mg 24 hr tablet Unknown at Unknown time  Yes No   Sig: Take by mouth   polyethylene glycol (MIRALAX) 17 g packet Unknown at Unknown time  No No   Sig: Take 17 g by mouth daily as needed (Constipation)      Facility-Administered Medications: None       Allergies: Allergies   Allergen Reactions    Amoxicillin-Pot Clavulanate     Clindamycin Other (See Comments)    Dilantin  [Phenytoin Sodium Extended] Other (See Comments)    Levaquin [Levofloxacin]     Penicillins      History:     Marital Status: /Civil Union     Substance Use History:   History   Alcohol Use No     History   Smoking Status    Never Smoker   Smokeless Tobacco    Never Used     History   Drug Use No       Family History:    History reviewed  No pertinent family history  Physical Exam:     Vitals:   Blood Pressure: (!) 189/77 (17)  Pulse: 63 (17)  Temperature: 98 4 °F (36 9 °C) (17)  Temp Source: Oral (17)  Respirations: 19 (17)  Height: 5' 3" (160 cm) (17)  Weight - Scale: 60 8 kg (134 lb 0 6 oz) (17)  SpO2: 95 % (17)    Physical Exam   Constitutional: He is oriented to person, place, and time  He appears well-developed  He is cooperative  No distress  HENT:   Head: Normocephalic and atraumatic  Nose: Nose normal    Mouth/Throat: Oropharynx is clear and moist  He has dentures     Eyes: Conjunctivae and EOM are normal  Pupils are equal, round, and reactive to light  Neck: Normal range of motion  Neck supple  No JVD present  Cardiovascular: Normal rate and regular rhythm  Exam reveals no gallop and no friction rub  Murmur heard  Pulmonary/Chest: Effort normal  No respiratory distress  He has decreased breath sounds  He has no wheezes  He has no rhonchi  He has no rales  He exhibits no tenderness  Left infraclavicular pacemaker in place   Abdominal: Soft  Bowel sounds are normal  He exhibits no distension  There is no tenderness  There is no rebound and no guarding  Musculoskeletal: He exhibits no edema  Right hip: He exhibits decreased range of motion and bony tenderness  Right lower extremity shortened and rotated   Neurological: He is alert and oriented to person, place, and time  He displays no tremor  No cranial nerve deficit or sensory deficit  GCS eye subscore is 4  GCS verbal subscore is 5  GCS motor subscore is 6  At his baseline   Skin: Skin is warm and dry  Ecchymosis (Over right elbow) noted  No rash noted  Psychiatric: He has a normal mood and affect   Cognition and memory are normal                Lab Results: I have personally reviewed pertinent films in PACS      Results from last 7 days  Lab Units 11/23/17  1539   WBC Thousand/uL 7 90   HEMOGLOBIN g/dL 11 7*   HEMATOCRIT % 34 8*   PLATELETS Thousands/uL 153   NEUTROS PCT % 73   LYMPHS PCT % 19   MONOS PCT % 5   EOS PCT % 3       Results from last 7 days  Lab Units 11/23/17  1539   SODIUM mmol/L 138   POTASSIUM mmol/L 4 5   CHLORIDE mmol/L 101   CO2 mmol/L 28   BUN mg/dL 33*   CREATININE mg/dL 1 33*   CALCIUM mg/dL 8 7   GLUCOSE RANDOM mg/dL 216*       Results from last 7 days  Lab Units 11/23/17  1539   INR  1 02       Imaging: I have personally reviewed pertinent films in PACS    Xr Hip/pelv 2-3 Vws Right    Result Date: 11/23/2017  Narrative: RIGHT HIP INDICATION:  Fall, RIGHT hip pain COMPARISON: Mayra 3, 2013 VIEWS: AP pelvis and 2 coned down views of the hip IMAGES:  3 FINDINGS: There is a nondisplaced RIGHT intertrochanteric fracture without dislocation or significant angulation  Mild displacement of the comminuted fracture fragment through the lesser trochanter, displaced medially  Mild bilateral degenerative hip joint narrowing  Osteophyte formation  Diffuse coarsened reticular pattern throughout the LEFT hemipelvis present previously, without gross change compared to priors, presumably representing Paget's or less likely fibrous dysplasia  This is also seen to a lesser degree through the region of the RIGHT femoral intertrochanteric fracture  Soft tissues are unremarkable  Vascular calcification present  Impression: RIGHT intertrochanteric fracture and displacement of the lesser trochanter medially  This is seen on a background of coarse and heterogeneous reticular changes within the RIGHT hip as well as throughout the LEFT hemipelvis  These findings were present previously and 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  Workstation performed: [id number]     Ct Head Without Contrast    Result Date: 11/23/2017  Narrative: CT BRAIN - WITHOUT CONTRAST INDICATION:  Fall COMPARISON:  CT head performed on 8/30/2017 TECHNIQUE:  CT examination of the brain was performed  In addition to axial images, coronal reformatted images were created and submitted for interpretation  Radiation dose length product (DLP) for this visit:  1186 01 mGy-cm   This examination, like all CT scans performed in the [facility], was performed utilizing techniques to minimize radiation dose exposure, including the use of iterative reconstruction and automated exposure control  IMAGE QUALITY:  Diagnostic  FINDINGS:  PARENCHYMA:  There is no acute intracranial hemorrhage, mass effect or midline shift  No extra-axial collection identified  Gray-white differentiation is maintained     Patchy areas of periventricular and deep white matter hypoattenuation noted  While nonspecific, these likely reflect changes of chronic microvascular ischemia in a patient of this age  Bilateral basal ganglia infarcts  Mild to moderate cerebral volume loss  VENTRICLES AND EXTRA-AXIAL SPACES:  Ventricles are commensurate with the degree of volume loss  Basal cisterns are patent  Atherosclerotic calcification of the intracranial vasculature is noted  VISUALIZED ORBITS AND PARANASAL SINUSES:  Unremarkable  CALVARIUM AND EXTRACRANIAL SOFT TISSUES:   Normal      Impression: No acute intracranial normality or significant change from 8/30/2017 Workstation performed: [id number]     Ct Cervical Spine Without Contrast    Result Date: 11/23/2017  Narrative: CT CERVICAL SPINE - WITHOUT CONTRAST INDICATION: Fall COMPARISON: None  TECHNIQUE:  CT examination of the cervical spine was performed without intravenous contrast   Contiguous axial images were obtained  Sagittal and coronal reconstructions were performed  Radiation dose length product (DLP) for this visit:  363 73 mGy-cm   This examination, like all CT scans performed in the [place], was performed utilizing techniques to minimize radiation dose exposure, including the use of iterative  reconstruction and automated exposure control  IMAGE QUALITY:  Diagnostic  FINDINGS: ALIGNMENT:  2 to 3 mm of retrolisthesis of C3 on C4 and 1 to 2 mm of anterolisthesis of C6 on C7  VERTEBRAL BODIES:  No acute fracture  Multilevel degenerative changes  No suspicious lytic or blastic lesion  DEGENERATIVE CHANGES:  Moderate multilevel cervical degenerative changes are noted  No critical central canal stenosis  PREVERTEBRAL AND PARASPINAL SOFT TISSUES: Bilateral carotid calcifications        THORACIC INLET:  Normal      Impression: No acute fracture or traumatic listhesis  Moderate spondylosis       Workstation performed: [id number]       CT cervical spine without contrast   Final Result      No acute fracture or traumatic listhesis  Moderate spondylosis  Workstation performed: [id number]         CT head without contrast   Final Result      No acute intracranial normality or significant change from 8/30/2017         Workstation performed: [id number]         XR hip/pelv 2-3 vws right   Final Result      RIGHT intertrochanteric fracture and displacement of the lesser trochanter medially  This is seen on a background of coarse and heterogeneous reticular changes within the RIGHT hip as well as throughout the LEFT hemipelvis  These findings were present    previously and 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  Workstation performed: [id number]         XR chest pa only    (Results Pending)   XR knee 1 or 2 vw right    (Results Pending)       EKG, Pathology, and Other Studies Reviewed on Admission:   · EKG-    [employer] Records Reviewed: Yes     ** Please Note: Dragon 360 Dictation voice to text software may have been used in the creation of this document   **

## 2017-11-23 NOTE — PLAN OF CARE
DISCHARGE PLANNING     Discharge to home or other facility with appropriate resources Progressing        INFECTION - ADULT     Absence or prevention of progression during hospitalization Progressing        Knowledge Deficit     Patient/family/caregiver demonstrates understanding of disease process, treatment plan, medications, and discharge instructions Progressing        MUSCULOSKELETAL - ADULT     Maintain or return mobility to safest level of function Progressing        PAIN - ADULT     Verbalizes/displays adequate comfort level or baseline comfort level Progressing        Potential for Falls     Patient will remain free of falls Progressing        Prexisting or High Potential for Compromised Skin Integrity     Skin integrity is maintained or improved Progressing        SAFETY ADULT     Maintain or return mobility status to optimal level Progressing

## 2017-11-23 NOTE — ED PROVIDER NOTES
History  Chief Complaint   Patient presents with    Leg Pain     per EMS report, pt getting out of his chair to get his walker & fell  c/o right upper leg pain  fall occurred approx 1/2 hour ago  states he fell onto his right side , states hit shoulder , "unsure of head strike  denies LOC/denies head/neck/back pain  Patient presents for evaluation after a fall  Patient was getting up from the couch and grabbing his walker when he lost his balance and fell on his right hip and shoulder  No LOC  Complains of right hip pain  History provided by:  Patient   used: No    Leg Pain       Prior to Admission Medications   Prescriptions Last Dose Informant Patient Reported? Taking? CARBAMAZEPINE PO Unknown at Unknown time  Yes No   Sig: Take 300 mg by mouth 3 (three) times a day     LISINOPRIL PO Unknown at Unknown time  Yes No   Sig: Take 5 mg by mouth daily at bedtime     amLODIPine (NORVASC) 5 mg tablet Unknown at Unknown time  Yes No   Sig: Take 5 mg by mouth   aspirin 81 mg chewable tablet Unknown at Unknown time  Yes No   Sig: Chew   levothyroxine 137 mcg tablet Unknown at Unknown time  Yes No   Si 075 mg     metoprolol succinate (TOPROL-XL) 100 mg 24 hr tablet Unknown at Unknown time  Yes No   Sig: Take by mouth   polyethylene glycol (MIRALAX) 17 g packet Unknown at Unknown time  No No   Sig: Take 17 g by mouth daily as needed (Constipation)      Facility-Administered Medications: None       Past Medical History:   Diagnosis Date    Arthritis     [facility]     colon    Cardiac disease     pacemaker    Diabetes mellitus ([facility] )     Disease of thyroid gland     Hypertension     Trigeminal neuralgia        Past Surgical History:   Procedure Laterality Date    APPENDECTOMY      CARDIAC PACEMAKER PLACEMENT      CATARACT EXTRACTION      COLON SURGERY      HERNIA REPAIR      SKIN GRAFT         History reviewed  No pertinent family history    I have reviewed and agree with the history as documented  Social History   Substance Use Topics    Smoking status: Never Smoker    Smokeless tobacco: Never Used    Alcohol use No        Review of Systems   All other systems reviewed and are negative  Physical Exam  ED Triage Vitals [11/23/17 1533]   Temperature Pulse Respirations Blood Pressure SpO2   (!) 97 4 °F (36 3 °C) 71 20 164/72 96 %      Temp Source Heart Rate Source Patient Position - Orthostatic VS BP Location FiO2 (%)   Tympanic Monitor Lying Right arm --      Pain Score       No Pain           Orthostatic Vital Signs  Vitals:    11/23/17 1533 11/23/17 1548 11/23/17 1731   BP: 164/72 139/61 (!) 189/77   Pulse: 71 70 63   Patient Position - Orthostatic VS: Lying Lying Lying       Physical Exam   Constitutional: He is oriented to person, place, and time  No distress  HENT:   Mouth/Throat: Oropharynx is clear and moist    Eyes: Pupils are equal, round, and reactive to light  Neck: Normal range of motion  Cardiovascular: Normal rate, regular rhythm and intact distal pulses  Pulmonary/Chest: Effort normal and breath sounds normal  No respiratory distress  Abdominal: Soft  There is no tenderness  Musculoskeletal: He exhibits tenderness  Legs:  Neurological: He is alert and oriented to person, place, and time  Skin: Capillary refill takes less than 2 seconds  He is not diaphoretic  Nursing note and vitals reviewed        ED Medications  Medications   carBAMazepine (TEGretol) tablet 300 mg (not administered)   docusate sodium (COLACE) capsule 100 mg (not administered)   polyethylene glycol (MIRALAX) packet 17 g (not administered)   ondansetron (ZOFRAN) injection 4 mg (not administered)   heparin (porcine) subcutaneous injection 5,000 Units (not administered)   acetaminophen (TYLENOL) tablet 650 mg (not administered)   sodium chloride 0 9 % bolus 500 mL (0 mL Intravenous Stopped 11/23/17 1658)   morphine (PF) 4 mg/mL injection 4 mg (4 mg Intravenous Given 11/23/17 1546)       Diagnostic Studies  Results Reviewed     Procedure Component Value Units Date/Time    Protime-INR [[id number]]  (Normal) Collected:  11/23/17 1539    Lab Status:  Final result Specimen:  Blood from Arm, Left Updated:  11/23/17 1603     Protime 10 7 seconds      INR 1 02    APTT [[id number]]  (Normal) Collected:  11/23/17 1539    Lab Status:  Final result Specimen:  Blood from Arm, Left Updated:  11/23/17 1603     PTT 24 seconds     Narrative: Therapeutic Heparin Range = 60-90 seconds    Basic metabolic panel [[id number]]  (Abnormal) Collected:  11/23/17 1539    Lab Status:  Final result Specimen:  Blood from Arm, Left Updated:  11/23/17 1557     Sodium 138 mmol/L      Potassium 4 5 mmol/L      Chloride 101 mmol/L      CO2 28 mmol/L      Anion Gap 9 mmol/L      BUN 33 (H) mg/dL      Creatinine 1 33 (H) mg/dL      Glucose 216 (H) mg/dL      Calcium 8 7 mg/dL      eGFR 44 ml/min/1 73sq m     Narrative:         National Kidney Disease Education Program recommendations are as follows:  GFR calculation is accurate only with a steady state creatinine  Chronic Kidney disease less than 60 ml/min/1 73 sq  meters  Kidney failure less than 15 ml/min/1 73 sq  meters      CBC and differential [[id number]]  (Abnormal) Collected:  11/23/17 1539    Lab Status:  Final result Specimen:  Blood from Arm, Left Updated:  11/23/17 1547     WBC 7 90 Thousand/uL      RBC 3 91 (L) Million/uL      Hemoglobin 11 7 (L) g/dL      Hematocrit 34 8 (L) %      MCV 89 fL      MCH 29 9 pg      MCHC 33 6 g/dL      RDW 14 2 %      MPV 7 9 (L) fL      Platelets 651 Thousands/uL      nRBC 0 /100 WBCs      Neutrophils Relative 73 %      Lymphocytes Relative 19 %      Monocytes Relative 5 %      Eosinophils Relative 3 %      Basophils Relative 0 %      Neutrophils Absolute 5 70 Thousands/µL      Lymphocytes Absolute 1 50 Thousands/µL      Monocytes Absolute 0 40 Thousand/µL      Eosinophils Absolute 0 30 Thousand/µL      Basophils Absolute 0 00 Thousands/µL                  CT cervical spine without contrast   Final Result by Bren Pa MD (11/23 1625)      No acute fracture or traumatic listhesis  Moderate spondylosis  Workstation performed: [id number]         CT head without contrast   Final Result by Bren Pa MD (11/23 1622)      No acute intracranial normality or significant change from 8/30/2017         Workstation performed: [id number]         XR hip/pelv 2-3 vws right   Final Result by Kenji Pablo MD (11/23 1617)      RIGHT intertrochanteric fracture and displacement of the lesser trochanter medially  This is seen on a background of coarse and heterogeneous reticular changes within the RIGHT hip as well as throughout the LEFT hemipelvis  These findings were present    previously and 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           Workstation performed: [id number]                    Procedures  Procedures       Phone Contacts  ED Phone Contact    ED Course  ED Course                                MDM  Number of Diagnoses or Management Options  Closed fracture of right hip, initial encounter [facility]):   Diagnosis management comments: Pulse ox 95% on RA indicating adequate oxygenation    Xray R hip: trochanter fx as read by me           Amount and/or Complexity of Data Reviewed  Clinical lab tests: ordered and reviewed  Tests in the radiology section of CPT®: ordered and reviewed  Discuss the patient with other providers: yes  Independent visualization of images, tracings, or specimens: yes    Patient Progress  Patient progress: stable    CritCare Time    Disposition  Final diagnoses:   Closed fracture of right hip, initial encounter [facility])     Time reflects when diagnosis was documented in both MDM as applicable and the Disposition within this note     Time User Action Codes Description Comment    11/23/2017  4:36 PM Daysi Moore Add [S72 001A] Closed fracture of right hip, initial encounter [facility])       ED Disposition     ED Disposition Condition Comment    Admit  Case was discussed with Dr Jenna Self and the patient's admission status was agreed to be admission to the service of Dr Jenna Self  Follow-up Information    None       Current Discharge Medication List      CONTINUE these medications which have NOT CHANGED    Details   amLODIPine (NORVASC) 5 mg tablet Take 5 mg by mouth      aspirin 81 mg chewable tablet Chew      CARBAMAZEPINE PO Take 300 mg by mouth 3 (three) times a day        levothyroxine 137 mcg tablet 0 075 mg        LISINOPRIL PO Take 5 mg by mouth daily at bedtime        metoprolol succinate (TOPROL-XL) 100 mg 24 hr tablet Take by mouth      polyethylene glycol (MIRALAX) 17 g packet Take 17 g by mouth daily as needed (Constipation)  Qty: 14 each, Refills: 0           No discharge procedures on file      ED Provider  Electronically Signed by           Hansa Lujan DO  11/23/17 6050

## 2017-11-24 ENCOUNTER — APPOINTMENT (INPATIENT)
Dept: RADIOLOGY | Facility: HOSPITAL | Age: 82
DRG: 480 | End: 2017-11-24
Payer: MEDICARE

## 2017-11-24 ENCOUNTER — HOSPITAL ENCOUNTER (INPATIENT)
Dept: RADIOLOGY | Facility: HOSPITAL | Age: 82
Discharge: HOME | DRG: 480 | End: 2017-11-24
Payer: MEDICARE

## 2017-11-24 ENCOUNTER — ANESTHESIA (INPATIENT)
Dept: PERIOP | Facility: HOSPITAL | Age: 82
DRG: 480 | End: 2017-11-24
Payer: MEDICARE

## 2017-11-24 ENCOUNTER — ANESTHESIA EVENT (INPATIENT)
Dept: PERIOP | Facility: HOSPITAL | Age: 82
DRG: 480 | End: 2017-11-24
Payer: MEDICARE

## 2017-11-24 LAB
ABO GROUP BLD: NORMAL
ANION GAP SERPL CALCULATED.3IONS-SCNC: 7 MMOL/L (ref 4–13)
ANION GAP SERPL CALCULATED.3IONS-SCNC: 8 MMOL/L (ref 4–13)
BASOPHILS # BLD AUTO: 0 THOUSANDS/ΜL (ref 0–0.1)
BASOPHILS # BLD AUTO: 0 THOUSANDS/ΜL (ref 0–0.1)
BASOPHILS NFR BLD AUTO: 0 % (ref 0–1)
BASOPHILS NFR BLD AUTO: 0 % (ref 0–1)
BLD GP AB SCN SERPL QL: NEGATIVE
BUN SERPL-MCNC: 33 MG/DL (ref 5–25)
BUN SERPL-MCNC: 36 MG/DL (ref 5–25)
CALCIUM SERPL-MCNC: 7.7 MG/DL (ref 8.3–10.1)
CALCIUM SERPL-MCNC: 8.1 MG/DL (ref 8.3–10.1)
CHLORIDE SERPL-SCNC: 105 MMOL/L (ref 100–108)
CHLORIDE SERPL-SCNC: 108 MMOL/L (ref 100–108)
CO2 SERPL-SCNC: 26 MMOL/L (ref 21–32)
CO2 SERPL-SCNC: 27 MMOL/L (ref 21–32)
CREAT SERPL-MCNC: 0.9 MG/DL (ref 0.6–1.3)
CREAT SERPL-MCNC: 1.22 MG/DL (ref 0.6–1.3)
EOSINOPHIL # BLD AUTO: 0 THOUSAND/ΜL (ref 0–0.61)
EOSINOPHIL # BLD AUTO: 0.1 THOUSAND/ΜL (ref 0–0.61)
EOSINOPHIL NFR BLD AUTO: 0 % (ref 0–6)
EOSINOPHIL NFR BLD AUTO: 1 % (ref 0–6)
ERYTHROCYTE [DISTWIDTH] IN BLOOD BY AUTOMATED COUNT: 14.1 % (ref 11.6–15.1)
ERYTHROCYTE [DISTWIDTH] IN BLOOD BY AUTOMATED COUNT: 14.2 % (ref 11.6–15.1)
EST. AVERAGE GLUCOSE BLD GHB EST-MCNC: 128 MG/DL
GFR SERPL CREATININE-BSD FRML MDRD: 49 ML/MIN/1.73SQ M
GFR SERPL CREATININE-BSD FRML MDRD: 71 ML/MIN/1.73SQ M
GLUCOSE SERPL-MCNC: 188 MG/DL (ref 65–140)
GLUCOSE SERPL-MCNC: 211 MG/DL (ref 65–140)
GLUCOSE SERPL-MCNC: 234 MG/DL (ref 65–140)
GLUCOSE SERPL-MCNC: 234 MG/DL (ref 65–140)
GLUCOSE SERPL-MCNC: 235 MG/DL (ref 65–140)
HBA1C MFR BLD: 6.1 % (ref 4.2–6.3)
HCT VFR BLD AUTO: 21.5 % (ref 42–52)
HCT VFR BLD AUTO: 27.2 % (ref 42–52)
HGB BLD-MCNC: 7.3 G/DL (ref 14–18)
HGB BLD-MCNC: 9.3 G/DL (ref 14–18)
LG PLATELETS BLD QL SMEAR: PRESENT
LYMPHOCYTES # BLD AUTO: 1.4 THOUSANDS/ΜL (ref 0.6–4.47)
LYMPHOCYTES # BLD AUTO: 1.4 THOUSANDS/ΜL (ref 0.6–4.47)
LYMPHOCYTES NFR BLD AUTO: 12 % (ref 14–44)
LYMPHOCYTES NFR BLD AUTO: 14 % (ref 14–44)
MAGNESIUM SERPL-MCNC: 2 MG/DL (ref 1.6–2.6)
MCH RBC QN AUTO: 30.1 PG (ref 27–31)
MCH RBC QN AUTO: 30.2 PG (ref 27–31)
MCHC RBC AUTO-ENTMCNC: 34 G/DL (ref 31.4–37.4)
MCHC RBC AUTO-ENTMCNC: 34.1 G/DL (ref 31.4–37.4)
MCV RBC AUTO: 89 FL (ref 82–98)
MCV RBC AUTO: 89 FL (ref 82–98)
MONOCYTES # BLD AUTO: 0.7 THOUSAND/ΜL (ref 0.17–1.22)
MONOCYTES # BLD AUTO: 0.9 THOUSAND/ΜL (ref 0.17–1.22)
MONOCYTES NFR BLD AUTO: 6 % (ref 4–12)
MONOCYTES NFR BLD AUTO: 8 % (ref 4–12)
NEUTROPHILS # BLD AUTO: 8.5 THOUSANDS/ΜL (ref 1.85–7.62)
NEUTROPHILS # BLD AUTO: 9.1 THOUSANDS/ΜL (ref 1.85–7.62)
NEUTS SEG NFR BLD AUTO: 79 % (ref 43–75)
NEUTS SEG NFR BLD AUTO: 80 % (ref 43–75)
NRBC BLD AUTO-RTO: 0 /100 WBCS
NRBC BLD AUTO-RTO: 0 /100 WBCS
PLATELET # BLD AUTO: 109 THOUSANDS/UL (ref 130–400)
PLATELET # BLD AUTO: 127 THOUSANDS/UL (ref 130–400)
PLATELET BLD QL SMEAR: ABNORMAL
PMV BLD AUTO: 8.2 FL (ref 8.9–12.7)
PMV BLD AUTO: 8.3 FL (ref 8.9–12.7)
POLYCHROMASIA BLD QL SMEAR: PRESENT
POTASSIUM SERPL-SCNC: 4.3 MMOL/L (ref 3.5–5.3)
POTASSIUM SERPL-SCNC: 4.6 MMOL/L (ref 3.5–5.3)
RBC # BLD AUTO: 2.42 MILLION/UL (ref 4.7–6.1)
RBC # BLD AUTO: 3.07 MILLION/UL (ref 4.7–6.1)
RH BLD: POSITIVE
SODIUM SERPL-SCNC: 140 MMOL/L (ref 136–145)
SODIUM SERPL-SCNC: 141 MMOL/L (ref 136–145)
SPECIMEN EXPIRATION DATE: NORMAL
TSH SERPL DL<=0.05 MIU/L-ACNC: 0.17 UIU/ML (ref 0.36–3.74)
WBC # BLD AUTO: 10.6 THOUSAND/UL (ref 4.8–10.8)
WBC # BLD AUTO: 11.5 THOUSAND/UL (ref 4.8–10.8)

## 2017-11-24 PROCEDURE — 85025 COMPLETE CBC W/AUTO DIFF WBC: CPT | Performed by: INTERNAL MEDICINE

## 2017-11-24 PROCEDURE — 80048 BASIC METABOLIC PNL TOTAL CA: CPT | Performed by: FAMILY MEDICINE

## 2017-11-24 PROCEDURE — 83735 ASSAY OF MAGNESIUM: CPT | Performed by: INTERNAL MEDICINE

## 2017-11-24 PROCEDURE — 86850 RBC ANTIBODY SCREEN: CPT | Performed by: INTERNAL MEDICINE

## 2017-11-24 PROCEDURE — 73502 X-RAY EXAM HIP UNI 2-3 VIEWS: CPT

## 2017-11-24 PROCEDURE — 86900 BLOOD TYPING SEROLOGIC ABO: CPT | Performed by: INTERNAL MEDICINE

## 2017-11-24 PROCEDURE — 83036 HEMOGLOBIN GLYCOSYLATED A1C: CPT | Performed by: INTERNAL MEDICINE

## 2017-11-24 PROCEDURE — 80048 BASIC METABOLIC PNL TOTAL CA: CPT | Performed by: INTERNAL MEDICINE

## 2017-11-24 PROCEDURE — 84443 ASSAY THYROID STIM HORMONE: CPT | Performed by: INTERNAL MEDICINE

## 2017-11-24 PROCEDURE — 82948 REAGENT STRIP/BLOOD GLUCOSE: CPT

## 2017-11-24 PROCEDURE — 0QS604Z REPOSITION RIGHT UPPER FEMUR WITH INTERNAL FIXATION DEVICE, OPEN APPROACH: ICD-10-PCS | Performed by: ORTHOPAEDIC SURGERY

## 2017-11-24 PROCEDURE — 85025 COMPLETE CBC W/AUTO DIFF WBC: CPT | Performed by: FAMILY MEDICINE

## 2017-11-24 PROCEDURE — C1713 ANCHOR/SCREW BN/BN,TIS/BN: HCPCS | Performed by: ORTHOPAEDIC SURGERY

## 2017-11-24 PROCEDURE — 86920 COMPATIBILITY TEST SPIN: CPT

## 2017-11-24 PROCEDURE — 93005 ELECTROCARDIOGRAM TRACING: CPT | Performed by: ANESTHESIOLOGY

## 2017-11-24 PROCEDURE — 86901 BLOOD TYPING SEROLOGIC RH(D): CPT | Performed by: INTERNAL MEDICINE

## 2017-11-24 DEVICE — 10MM/130 DEG TI CANN TROCH FIXATION NAIL 170MM-STERILE: Type: IMPLANTABLE DEVICE | Site: LEG | Status: FUNCTIONAL

## 2017-11-24 DEVICE — 5.0MM TI LOCKING SCREW W/T25 STARDRIVE 42MM F/IM NAIL-STER: Type: IMPLANTABLE DEVICE | Site: LEG | Status: FUNCTIONAL

## 2017-11-24 DEVICE — 11.0MM TI HELICAL BLADE 95MM-STERILE: Type: IMPLANTABLE DEVICE | Site: LEG | Status: FUNCTIONAL

## 2017-11-24 RX ORDER — BUPIVACAINE HYDROCHLORIDE 5 MG/ML
INJECTION, SOLUTION PERINEURAL AS NEEDED
Status: DISCONTINUED | OUTPATIENT
Start: 2017-11-24 | End: 2017-11-24 | Stop reason: HOSPADM

## 2017-11-24 RX ORDER — BUPIVACAINE HYDROCHLORIDE 7.5 MG/ML
INJECTION, SOLUTION EPIDURAL; RETROBULBAR AS NEEDED
Status: DISCONTINUED | OUTPATIENT
Start: 2017-11-24 | End: 2017-11-24 | Stop reason: SURG

## 2017-11-24 RX ORDER — EPHEDRINE SULFATE 50 MG/ML
INJECTION, SOLUTION INTRAVENOUS AS NEEDED
Status: DISCONTINUED | OUTPATIENT
Start: 2017-11-24 | End: 2017-11-24 | Stop reason: SURG

## 2017-11-24 RX ORDER — VANCOMYCIN HYDROCHLORIDE 1 G/200ML
1000 INJECTION, SOLUTION INTRAVENOUS EVERY 12 HOURS SCHEDULED
Status: COMPLETED | OUTPATIENT
Start: 2017-11-25 | End: 2017-11-25

## 2017-11-24 RX ORDER — HYDROMORPHONE HCL 110MG/55ML
0.5 PATIENT CONTROLLED ANALGESIA SYRINGE INTRAVENOUS
Status: DISCONTINUED | OUTPATIENT
Start: 2017-11-24 | End: 2017-11-24

## 2017-11-24 RX ORDER — PROPOFOL 10 MG/ML
INJECTION, EMULSION INTRAVENOUS AS NEEDED
Status: DISCONTINUED | OUTPATIENT
Start: 2017-11-24 | End: 2017-11-24 | Stop reason: SURG

## 2017-11-24 RX ORDER — SODIUM CHLORIDE 9 MG/ML
INJECTION, SOLUTION INTRAVENOUS CONTINUOUS PRN
Status: DISCONTINUED | OUTPATIENT
Start: 2017-11-24 | End: 2017-11-24 | Stop reason: SURG

## 2017-11-24 RX ORDER — SODIUM CHLORIDE 9 MG/ML
75 INJECTION, SOLUTION INTRAVENOUS CONTINUOUS
Status: DISCONTINUED | OUTPATIENT
Start: 2017-11-24 | End: 2017-11-24

## 2017-11-24 RX ORDER — SENNOSIDES 8.6 MG
1 TABLET ORAL
Status: DISCONTINUED | OUTPATIENT
Start: 2017-11-24 | End: 2017-11-29 | Stop reason: HOSPADM

## 2017-11-24 RX ORDER — FENTANYL CITRATE/PF 50 MCG/ML
25 SYRINGE (ML) INJECTION AS NEEDED
Status: DISCONTINUED | OUTPATIENT
Start: 2017-11-24 | End: 2017-11-24 | Stop reason: HOSPADM

## 2017-11-24 RX ORDER — FENTANYL CITRATE 50 UG/ML
INJECTION, SOLUTION INTRAMUSCULAR; INTRAVENOUS AS NEEDED
Status: DISCONTINUED | OUTPATIENT
Start: 2017-11-24 | End: 2017-11-24 | Stop reason: SURG

## 2017-11-24 RX ORDER — ONDANSETRON 2 MG/ML
4 INJECTION INTRAMUSCULAR; INTRAVENOUS ONCE AS NEEDED
Status: DISCONTINUED | OUTPATIENT
Start: 2017-11-24 | End: 2017-11-24 | Stop reason: HOSPADM

## 2017-11-24 RX ORDER — VANCOMYCIN HYDROCHLORIDE 1 G/200ML
1000 INJECTION, SOLUTION INTRAVENOUS ONCE
Status: COMPLETED | OUTPATIENT
Start: 2017-11-24 | End: 2017-11-24

## 2017-11-24 RX ORDER — HYDROMORPHONE HCL 110MG/55ML
0.5 PATIENT CONTROLLED ANALGESIA SYRINGE INTRAVENOUS
Status: DISCONTINUED | OUTPATIENT
Start: 2017-11-24 | End: 2017-11-25

## 2017-11-24 RX ORDER — PROPOFOL 10 MG/ML
INJECTION, EMULSION INTRAVENOUS CONTINUOUS PRN
Status: DISCONTINUED | OUTPATIENT
Start: 2017-11-24 | End: 2017-11-24 | Stop reason: SURG

## 2017-11-24 RX ORDER — OXYCODONE HYDROCHLORIDE 5 MG/1
5 TABLET ORAL EVERY 4 HOURS PRN
Status: DISCONTINUED | OUTPATIENT
Start: 2017-11-24 | End: 2017-11-29 | Stop reason: HOSPADM

## 2017-11-24 RX ORDER — AMLODIPINE BESYLATE 5 MG/1
5 TABLET ORAL ONCE
Status: COMPLETED | OUTPATIENT
Start: 2017-11-24 | End: 2017-11-24

## 2017-11-24 RX ORDER — HYDROMORPHONE HCL 110MG/55ML
0.4 PATIENT CONTROLLED ANALGESIA SYRINGE INTRAVENOUS
Status: DISCONTINUED | OUTPATIENT
Start: 2017-11-24 | End: 2017-11-24 | Stop reason: HOSPADM

## 2017-11-24 RX ORDER — MAGNESIUM HYDROXIDE 1200 MG/15ML
LIQUID ORAL AS NEEDED
Status: DISCONTINUED | OUTPATIENT
Start: 2017-11-24 | End: 2017-11-24 | Stop reason: HOSPADM

## 2017-11-24 RX ORDER — OXYCODONE HYDROCHLORIDE 5 MG/1
10 TABLET ORAL EVERY 4 HOURS PRN
Status: DISCONTINUED | OUTPATIENT
Start: 2017-11-24 | End: 2017-11-25

## 2017-11-24 RX ADMIN — FENTANYL CITRATE 50 MCG: 50 INJECTION, SOLUTION INTRAMUSCULAR; INTRAVENOUS at 16:34

## 2017-11-24 RX ADMIN — INSULIN LISPRO 2 UNITS: 100 INJECTION, SOLUTION INTRAVENOUS; SUBCUTANEOUS at 09:27

## 2017-11-24 RX ADMIN — EPHEDRINE SULFATE 10 MG: 50 INJECTION, SOLUTION INTRAMUSCULAR; INTRAVENOUS; SUBCUTANEOUS at 16:38

## 2017-11-24 RX ADMIN — SODIUM CHLORIDE 50 ML/HR: 0.9 INJECTION, SOLUTION INTRAVENOUS at 21:56

## 2017-11-24 RX ADMIN — BUPIVACAINE HYDROCHLORIDE 1.2 ML: 7.5 INJECTION, SOLUTION EPIDURAL; RETROBULBAR at 16:35

## 2017-11-24 RX ADMIN — PROPOFOL 20 MG: 10 INJECTION, EMULSION INTRAVENOUS at 16:54

## 2017-11-24 RX ADMIN — CARBAMAZEPINE 300 MG: 200 TABLET ORAL at 21:30

## 2017-11-24 RX ADMIN — HYDROMORPHONE HYDROCHLORIDE 0.5 MG: 2 INJECTION, SOLUTION INTRAMUSCULAR; INTRAVENOUS; SUBCUTANEOUS at 21:28

## 2017-11-24 RX ADMIN — PROPOFOL 50 MCG/KG/MIN: 10 INJECTION, EMULSION INTRAVENOUS at 16:54

## 2017-11-24 RX ADMIN — AMLODIPINE BESYLATE 5 MG: 5 TABLET ORAL at 09:28

## 2017-11-24 RX ADMIN — METOPROLOL SUCCINATE 100 MG: 100 TABLET, FILM COATED, EXTENDED RELEASE ORAL at 09:28

## 2017-11-24 RX ADMIN — PHENYLEPHRINE HYDROCHLORIDE 10 MCG/MIN: 10 INJECTION, SOLUTION INTRAMUSCULAR; INTRAVENOUS; SUBCUTANEOUS at 17:22

## 2017-11-24 RX ADMIN — CARBAMAZEPINE 300 MG: 200 TABLET ORAL at 09:28

## 2017-11-24 RX ADMIN — DOCUSATE SODIUM 100 MG: 100 CAPSULE, LIQUID FILLED ORAL at 09:28

## 2017-11-24 RX ADMIN — FENTANYL CITRATE 50 MCG: 50 INJECTION, SOLUTION INTRAMUSCULAR; INTRAVENOUS at 16:33

## 2017-11-24 RX ADMIN — VANCOMYCIN HYDROCHLORIDE 1000 MG: 1 INJECTION, SOLUTION INTRAVENOUS at 16:11

## 2017-11-24 RX ADMIN — LEVOTHYROXINE SODIUM 75 MCG: 75 TABLET ORAL at 06:09

## 2017-11-24 RX ADMIN — SODIUM CHLORIDE: 0.9 INJECTION, SOLUTION INTRAVENOUS at 16:11

## 2017-11-24 RX ADMIN — HYDROMORPHONE HYDROCHLORIDE 0.2 MG: 1 INJECTION, SOLUTION INTRAMUSCULAR; INTRAVENOUS; SUBCUTANEOUS at 04:19

## 2017-11-24 RX ADMIN — INSULIN LISPRO 1 UNITS: 100 INJECTION, SOLUTION INTRAVENOUS; SUBCUTANEOUS at 12:33

## 2017-11-24 RX ADMIN — AMLODIPINE BESYLATE 5 MG: 5 TABLET ORAL at 12:33

## 2017-11-24 NOTE — OCCUPATIONAL THERAPY NOTE
Occupational Therapy    Holding OT/PT intervention until pt undergoes sx intervention of cephalomedullary nailing right hip   Current orders for strict bed rest

## 2017-11-24 NOTE — PROGRESS NOTES
[facility] Internal Medicine Progress Note  Patient: Alissa Ramirez 80 y o  male   MRN: [id number]  PCP: Amrik Howard DO  Unit/Bed#: [address] [place] [address] Encounter: [id number]  Date Of Visit: 11/24/17    Problem List:    Principal Problem:    Closed fracture of right hip ([facility] )  Active Problems:    Frequent falls    Leg pain    Cardiac disease    Hypertension    Disease of thyroid gland    Diabetes mellitus ([facility] )    Arthritis      Assessment & Plan:    1  Right hip intertrochanteric fracture-continue pain control, DVT prophylaxis  Patient is planned for OR today  Discussed with Cardiology, patient at moderately high risk for perioperative complication due to advance age and comorbidities  Will require close postoperative monitoring  Monitor on telemetry, monitor blood pressure  Follow-up CBC  2  Status post fall, likely mechanical; history of recurrent fall-blood pressure is elevated  Telemetry noted  3  Normocytic anemia, acute on chronic secondary to 1-will continue to monitor postoperatively  Will type and cross  4  Uncontrolled hypertension-asymptomatic  Continue Norvasc, metoprolol and lisinopril  Additional dose of Norvasc today  Monitor blood pressure  5  Acute kidney injury on CKD stage 3-improved to baseline  Monitor  6  History of impaired glucose tolerance with hemoglobin A1c of 6 1-monitor on corrective scale  7  Hypothyroidism-on Synthroid  TSH noted  Will repeat TSH and T4  8  History of trigeminal neurology, intractable-currently controlled on carbamazepine  9  History of complete heart block status post pacemaker placement  10  History of SIADH-sodium level is stable continue to monitor, avoid hypotonic solution  Fluid restriction  11  History of colon cancer status post colectomy in remission      VTE Pharmacologic Prophylaxis:   Pharmacologic: Heparin  Mechanical VTE Prophylaxis in Place: Yes    Patient Centered Rounds: I have performed bedside rounds with nursing staff today      Discussions with Specialists or Other Care Team Provider: Yes, Dr Monique Espinal and Dr An Lobato    Education and Discussions with Family / Patient:Yes    Time Spent for Care: 45 minutes  More than 50% of total time spent on counseling and coordination of care as described above  Current Length of Stay: 1 day(s)    Current Patient Status: Inpatient     Discharge Plan:  Anticipate rehab    Code Status: Level 1 - Full Code      Subjective:   Reports minimal right hip pain  Denies chest pain, shortness of breath, palpitation or dizziness  Anxious earlier      Objective:   Not in distress      Negative for Chest Pain, Palpitations, Shortness of Breath, Abdominal Pain, Nausea, Vomiting, Constipation, Diarrhea, Dizziness  All other 10 review of systems negative and without drastic interval changes from yesterday  Vitals:   Temp (24hrs), Av 4 °F (36 9 °C), Min:97 4 °F (36 3 °C), Max:99 °F (37 2 °C)    HR:  [63-93] 80  Resp:  [18-24] 18  BP: (139-210)/(61-88) 172/73  SpO2:  [95 %-97 %] 97 %  Body mass index is 23 74 kg/m²  Input and Output Summary (last 24 hours): Intake/Output Summary (Last 24 hours) at 17 1243  Last data filed at 17 1129   Gross per 24 hour   Intake              500 ml   Output              450 ml   Net               50 ml       Physical Exam:     Physical Exam   Constitutional: He appears well-developed  No distress  HENT:   Head: Normocephalic and atraumatic  Nose: Nose normal    Mouth/Throat: Oropharynx is clear and moist    Eyes: Conjunctivae and EOM are normal  Pupils are equal, round, and reactive to light  Neck: Normal range of motion  Neck supple  No JVD present  Cardiovascular: Normal rate and regular rhythm  Exam reveals no gallop and no friction rub  Murmur heard  Pulmonary/Chest: Effort normal  No respiratory distress  He has decreased breath sounds  He has no wheezes  He has no rhonchi  He has no rales  He exhibits no tenderness  Abdominal: Soft   Bowel sounds are normal  He exhibits no distension  There is no tenderness  There is no rebound and no guarding  Musculoskeletal: He exhibits no edema  Right hip: He exhibits decreased range of motion and tenderness  Neurological: He is alert  No cranial nerve deficit  Skin: Skin is warm and dry  No rash noted  Psychiatric: He has a normal mood and affect  Additional Data:     Labs:      Results from last 7 days  Lab Units 11/24/17  0709   WBC Thousand/uL 10 60   HEMOGLOBIN g/dL 9 3*   HEMATOCRIT % 27 2*   PLATELETS Thousands/uL 127*   NEUTROS PCT % 80*   LYMPHS PCT % 14   MONOS PCT % 6   EOS PCT % 1       Results from last 7 days  Lab Units 11/24/17  0709   SODIUM mmol/L 140   POTASSIUM mmol/L 4 6   CHLORIDE mmol/L 105   CO2 mmol/L 27   BUN mg/dL 36*   CREATININE mg/dL 1 22   CALCIUM mg/dL 8 1*   GLUCOSE RANDOM mg/dL 211*       Results from last 7 days  Lab Units 11/23/17  1539   INR  1 02     Telemetry reviewed with Cardiology  * I Have Reviewed All Lab Data Listed Above  * Additional Pertinent Lab Tests Reviewed: Clarice  Admission Reviewed    Imaging:  Xr Hip/pelv 2-3 Vws Right    Result Date: 11/23/2017  Narrative: RIGHT HIP INDICATION:  Fall, RIGHT hip pain COMPARISON: [facility] 3, 2013 VIEWS: AP pelvis and 2 coned down views of the hip IMAGES:  3 FINDINGS: There is a nondisplaced RIGHT intertrochanteric fracture without dislocation or significant angulation  Mild displacement of the comminuted fracture fragment through the lesser trochanter, displaced medially  Mild bilateral degenerative hip joint narrowing  Osteophyte formation  Diffuse coarsened reticular pattern throughout the LEFT hemipelvis present previously, without gross change compared to priors, presumably representing Paget's or less likely fibrous dysplasia  This is also seen to a lesser degree through the region of the RIGHT femoral intertrochanteric fracture  Soft tissues are unremarkable    Vascular calcification present  Impression: RIGHT intertrochanteric fracture and displacement of the lesser trochanter medially  This is seen on a background of coarse and heterogeneous reticular changes within the RIGHT hip as well as throughout the LEFT hemipelvis  These findings were present previously and 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  Workstation performed: [id number]     Xr Knee 1 Or 2 Vw Right    Result Date: 11/24/2017  Narrative: RIGHT KNEE INDICATION:  Right knee pain  COMPARISON: None VIEWS:  AP and lateral IMAGES:  2 FINDINGS: There is no acute fracture or dislocation  There is no joint effusion  No degenerative changes  No lytic or blastic lesions are seen  There are atherosclerotic calcifications  Soft tissues are otherwise unremarkable  Impression: No acute osseous abnormality  Workstation performed: [id number]     Ct Head Without Contrast    Result Date: 11/23/2017  Narrative: CT BRAIN - WITHOUT CONTRAST INDICATION:  Fall COMPARISON:  CT head performed on 8/30/2017 TECHNIQUE:  CT examination of the brain was performed  In addition to axial images, coronal reformatted images were created and submitted for interpretation  Radiation dose length product (DLP) for this visit:  1186 01 mGy-cm   This examination, like all CT scans performed in the [facility], was performed utilizing techniques to minimize radiation dose exposure, including the use of iterative reconstruction and automated exposure control  IMAGE QUALITY:  Diagnostic  FINDINGS:  PARENCHYMA:  There is no acute intracranial hemorrhage, mass effect or midline shift  No extra-axial collection identified  Gray-white differentiation is maintained  Patchy areas of periventricular and deep white matter hypoattenuation noted  While nonspecific, these likely reflect changes of chronic microvascular ischemia in a patient of this age  Bilateral basal ganglia infarcts  Mild to moderate cerebral volume loss   VENTRICLES AND EXTRA-AXIAL SPACES:  Ventricles are commensurate with the degree of volume loss  Basal cisterns are patent  Atherosclerotic calcification of the intracranial vasculature is noted  VISUALIZED ORBITS AND PARANASAL SINUSES:  Unremarkable  CALVARIUM AND EXTRACRANIAL SOFT TISSUES:   Normal      Impression: No acute intracranial normality or significant change from 8/30/2017 Workstation performed: [id number]     Ct Cervical Spine Without Contrast    Result Date: 11/23/2017  Narrative: CT CERVICAL SPINE - WITHOUT CONTRAST INDICATION: Fall COMPARISON: None  TECHNIQUE:  CT examination of the cervical spine was performed without intravenous contrast   Contiguous axial images were obtained  Sagittal and coronal reconstructions were performed  Radiation dose length product (DLP) for this visit:  363 73 mGy-cm   This examination, like all CT scans performed in the [facility], was performed utilizing techniques to minimize radiation dose exposure, including the use of iterative  reconstruction and automated exposure control  IMAGE QUALITY:  Diagnostic  FINDINGS: ALIGNMENT:  2 to 3 mm of retrolisthesis of C3 on C4 and 1 to 2 mm of anterolisthesis of C6 on C7  VERTEBRAL BODIES:  No acute fracture  Multilevel degenerative changes  No suspicious lytic or blastic lesion  DEGENERATIVE CHANGES:  Moderate multilevel cervical degenerative changes are noted  No critical central canal stenosis  PREVERTEBRAL AND PARASPINAL SOFT TISSUES: Bilateral carotid calcifications        THORACIC INLET:  Normal      Impression: No acute fracture or traumatic listhesis  Moderate spondylosis  Workstation performed: [id number]     Xr Chest Pa Only    Result Date: 11/24/2017  Narrative: CHEST  INDICATION: Preoperative exam  COMPARISON:  3/21/2015  VIEWS:   AP frontal; 1 image FINDINGS: Left chest wall pacemaker is present  The cardiomediastinal silhouette is stable  The lungs are clear  No pleural effusion   Osseous structures are age appropriate  Impression: No active disease  Workstation performed: [id number]     Imaging Reports Reviewed by myself    Cultures:   Blood Culture: No results found for: BLOODCX  Urine Culture: No results found for: URINECX  Sputum Culture: No components found for: SPUTUMCX  Wound Culture: No results found for: WOUNDCULT    Last 24 Hours Medication List:     amLODIPine 5 mg Oral Daily   aspirin 81 mg Oral Daily   carBAMazepine 300 mg Oral TID   docusate sodium 100 mg Oral BID   heparin (porcine) 5,000 Units Subcutaneous Q8H [address]   insulin lispro 1-5 Units Subcutaneous TID AC   levothyroxine 75 mcg Oral Early Morning   lisinopril 5 mg Oral HS   metoprolol succinate 100 mg Oral Daily   vancomycin 1,000 mg Intravenous Once        Today, Patient Was Seen By: Trinidad Olson MD    ** Please Note: Dragon 360 Dictation voice to text software may have been used in the creation of this document   **

## 2017-11-24 NOTE — OP NOTE
OPERATIVE REPORT  PATIENT NAME: Pamella Pascual    :  1919  MRN: [id number]  Pt Location: [facility] OR ROOM 03    SURGERY DATE: 2017    Surgeon(s) and Role:     * Alis Blake DO - Primary     * Olivia Sanchez PA-C - Assisting     * no qualified resident was available an assistant was needed for positioning, soft tissue retraction and to complete the case  Preop Diagnosis:  Closed intertrochanteric fracture of right hip, initial encounter ([place] [address] )    Post-Op Diagnosis Codes:     * Closed intertrochanteric fracture of right hip, initial encounter ([place] [address] )    Procedure(s) (LRB):  INSERTION NAIL IM FEMUR ANTEGRADE (TROCHANTERIC) (Right)    Specimen(s):  * No specimens in log *    Estimated Blood Loss:   200 mL    Drains:  None     Anesthesia Type:   Spinal with sedation    Intravenous fluids:  1000 cc    Urine output:  Boothe:  100 cc    Antibiotics:  Vancomycin 1 g    Implants:  Synthes 10 mm x 170 mm 130 degree trochanteric fixation nail, 11 mm x 95 mm helical blade, 5 0 mm x 42 mm distal locking screw    Operative Indications:  Closed fracture of right hip, initial encounter ([place] [address] ) [S72 001A]  Patient is a very pleasant 80year-old male that presents up after a ground level mechanical fall on the afternoon of 2017  The patient states that he was going to rising get up from the table after Thanksgiving dinner when he lost his balance and fell directly onto his right hip  Medially after the fall he had difficulty ambulating and had tremendous pain in his right hip  He was unable to put weight on his right lower extremity  He was brought to [facility] for evaluation was found to have a nondisplaced 2 part intertrochanteric fracture of the right hip  He was admitted for medical optimization and further treatment and evaluation  Treatment options both non operative and operative were discussed with the patient   The patient elected to undergo operative fixation of his right hip with placement of a cephalomedullary nail  The risks and benefits of undergoing this surgical intervention were discussed at length with the patient  Operative Findings:  Patient demonstrated a 2 part stable intertrochanteric fracture of the right hip with a displaced fragment from the lesser trochanter  Closed reduction was obtained using the fracture table and confirmed with AP and lateral fluoroscopy  A 10 mm x 170 mm 130 degree trochanteric fixation nail was inserted into the proximal femur with a 11 mm x 95 mm helical blade up through the femoral neck into the center center position of the femoral head with a tip to apex distance less than 25 mm  Stable fixation of the fracture site was obtained  Compression was applied through the intramedullary device to compress the fracture site  Appropriate position was confirmed after the distal locking screw was placed  The fracture was anatomically reduced and the nail was in appropriate position  Complications:   None    Procedure and Technique:Patient was identified in the preoperative holding area and the right lower extremity was identified and marked by the orthopedic staff  The patient's questions final questions were addressed prior to surgery  The consents were visualized to ensure correct laterality and intended procedure and were deemed to be correct and replaced in the chart  The patient was taken back to the operating room where spinal anesthesia was administered by the anesthesia staff  The patient was then positioned on the fracture table for the intended procedure  Preoperative antibiotics in the form of vancomycin 1 g were administered  The patient was positioned on the fracture table with the right lower extremity in extension and placed in the traction boot  The non operative extremity was placed up into a well billingsley in a flexed and abduct position  All bony prominences were well padded in the non operative leg    After the patient was positioned, reduction using the fracture table was performed  Appropriate reduction of the fracture in the AP and lateral planes was confirmed using fluoroscopy  The right lower extremity was prepped and draped in the normal sterile fashion  A timeout was performed  The borders of the proximal femur were delineated using a marking pen and the long axis of the femur was drawn with a marking pen extending proximal to the greater troch  Two finger breaths above the greater trochanter a longitudinal skin incision was made using a scalpel in line with the longitudinal axis of the femur  Deep dissection was carried down through the subcutaneous tissue using a Bovie to ensure hemostasis  The deep fascia was identified and incised using a Bovie  The tip of the greater trochanter was palpated digitally and the starting guidewire was placed upon the tip of the greater trochanter  Using AP and lateral fluoroscopy the appropriate starting position was obtained and the guidewire was inserted down to the level of lesser troch  The opening reamer was then used and carried down through the intratrochanteric region down to the lesser troch while using a tissue protector  The guidewire and the reamer were removed and a 10 mm by 130° trochanteric fixation nail was inserted into the proximal femur  The nail was impacted into its final position in the proximal femur to allow placement of the helical blade into the appropriate position in the femoral head  The 451° helical blade targeting guide was assembled and attached to the insertion arm and the correct area for the distal incision was delineated  Sharp dissection was carried down through the skin  Deep dissection was done with a Bovie to ensure hemostasis  The IT band was identified and incised using a Bovie and the lateral border of the femur was palpated   The targeting triple sleeve guide was inserted and its inner sleeve was carried down to the lateral border of the femur and a guidewire was inserted  The guidewire was inserted into the apex position of the femoral head  The guidewire was placed just below the subchondral bone in both the AP and lateral views in the apex position of the femoral head  Once in the correct position was the guidepin was measured for the length of the helical blade  The lateral cortex was then opened and the helical blade reamer was then set to 95 mm and inserted up through the targeting sleeve to prepare the femoral neck and head for the helical blade  This was done without penetration of the guidewire through the femoral chondral surface and into the acetabulum  The reamer was removed and the 95 mm x 11 mm helical blade was then inserted through the targeting guide and impacted up into its final position into the femoral neck and head  The helical blade was appropriately positioned on both AP and lateral views using fluoroscopy with a tip to apex distance less than 25 mm  The proximal locking screw was then tightened down to secure the position of the helical blade  Compression of the intertrochanteric fracture site was applied through the intramedullary device  At this point the helical blade insertion device was removed and the 130 ° trochars were also removed  Preporations for the distal locking screw were made by inserting the appropriate trochars to the targeting guide  The distal incision needed to be lengthened slightly distally to allow for use of this incision for the distal locking screw  The hole for the distal locking screw was drilled measured and the appropriate length 42 mm screw was then inserted and appeared to be the appropriate length  The insertion arm was then removed from the trochanteric nail  Final images of the trochanteric nail was obtained in both AP and lateral planes demonstrating an appropriate position  The 2 incisions were copiously irrigated with normal saline solution   The deep fascia of the 2 incisions was closed using an 0 Vicryl in a running locked fashion  The deep subcutaneous tissues of both incisions were reapproximated using 0 Vicryl sutures, the superficial subcutaneous tissues were reapproximated using an undyed 2-0 Vicryl, and the skin edges were reapproximated using staples  The leg was cleaned and dried and sterile dressings were applied to both incisions  The drapes were removed and the patient was taken out of positioning on the fracture table  Sedation for spinal anesthesia was reversed and the patient was awakened without complication  The patient was transferred back to the hospital bed and was transferred to PACU in stable condition       I was present for the entire procedure    Patient Disposition:  PACU     SIGNATURE: Lawrence Thibodeaux DO  DATE: November 24, 2017  TIME: 6:25 PM

## 2017-11-24 NOTE — ANESTHESIA PREPROCEDURE EVALUATION
Review of Systems/Medical History  Patient summary reviewed  Chart reviewed      Cardiovascular  EKG reviewed, Pacemaker/AICD, Hypertension , Valvular heart disease , aortic valve stenosis and aortic regurgitation, Dysrhythmias, lela dysrhythmia,   Comment: ECHO:  SUMMARY     LEFT VENTRICLE:  Systolic function was normal by visual assessment  Ejection fraction was estimated to be 55 %  There were no regional wall motion abnormalities  There was mild concentric hypertrophy  Doppler parameters were consistent with abnormal left ventricular relaxation (grade 1 diastolic dysfunction)  Doppler parameters were consistent with elevated mean left atrial filling pressure      MITRAL VALVE:  There was very mild stenosis      AORTIC VALVE:  The valve was trileaflet  Leaflets exhibited normal thickness, calcification, and markedly reduced cuspal separation  There was mild stenosis  There was mild to moderate regurgitation      TRICUSPID VALVE:  There was mild regurgitation  Pulmonary artery systolic pressure was at the upper limits of normal      AORTA:  The root exhibited mild dilatation      HISTORY: PRIOR HISTORY: Cardiac pacemaker, HTN, Hyponatremia, colon cancer, Diabetes, CAD,Thyroid disease     PROCEDURE: The procedure was performed at the bedside  This was a routine study  The transthoracic approach was used  The study included complete 2D imaging, M-mode, complete spectral Doppler, and color Doppler  The heart rate was 68 bpm,  at the start of the study  Images were obtained from the parasternal, apical, subcostal, and suprasternal notch acoustic windows  Image quality was adequate      LEFT VENTRICLE: Size was normal  Systolic function was normal by visual assessment  Ejection fraction was estimated to be 55 %  There were no regional wall motion abnormalities  There was mild concentric hypertrophy   DOPPLER: Doppler  parameters were consistent with abnormal left ventricular relaxation (grade 1 diastolic dysfunction)  Doppler parameters were consistent with elevated mean left atrial filling pressure      RIGHT VENTRICLE: The size was normal  Systolic function was normal  DOPPLER: Systolic pressure was within the normal range      LEFT ATRIUM: The atrium was mildly dilated      RIGHT ATRIUM: Size was normal      MITRAL VALVE: There was annular calcification  Valve structure was normal  There was normal leaflet separation  No echocardiographic evidence for prolapse  DOPPLER: The transmitral velocity was within the normal range  There was very mild  stenosis  There was no regurgitation      AORTIC VALVE: The valve was trileaflet  Leaflets exhibited normal thickness, calcification, and markedly reduced cuspal separation  DOPPLER: Transaortic velocity was within the normal range  There was mild stenosis  There was mild to  moderate regurgitation      TRICUSPID VALVE: The valve structure was normal  There was normal leaflet separation  DOPPLER: The transtricuspid velocity was within the normal range  There was mild regurgitation  Pulmonary artery systolic pressure was at the upper  limits of normal  Estimated peak PA pressure was 38 mmHg      PULMONIC VALVE: Leaflets exhibited normal thickness, no calcification, and normal cuspal separation  DOPPLER: The transpulmonic velocity was within the normal range  There was no regurgitation      PERICARDIUM: There was no thickening   There was no pericardial effusion      AORTA: The root exhibited mild dilatation      PULMONARY ARTERY: The size was normal  The morphology appeared normal      MEASUREMENT TABLES     DOPPLER MEASUREMENTS  Aortic valve   (Reference normals)  Peak gradient   17 mmHg   (--)     SYSTEM MEASUREMENT TABLES     2D mode  AoR Diam 2D: 4 cm  LA Diam (2D): 3 6 cm  LA/Ao (2D): 0 9  FS (2D Teich): 29 6 %  IVSd (2D): 1 21 cm  LVDEV: 76 4 cm³  LVESV: 32 8 cm³  LVIDd(2D): 4 15 cm  LVISd (2D): 2 92 cm  LVOT Area 2D: 3 14 cm squared  LVPWd (2D): 1 22 cm  SV (Teich): 43 6 cm³     Apical four chamber  LVEF A4C: 55 %     Unspecified Scan Mode  JOSE Cont Eq (Peak Juwan): 1 23 cm squared  Dec  [place]; Regurgitant Flow: 2430 mm/s2  Max P mm(Hg)  V Max: 3150 mm/s  Vmax: 3150 mm/s  LVOT (VTI): 15 4 cm  LVOT Diam : 2 cm  LVOT Vmax: 809 mm/s  LVOT Vmax; Mean: 822 mm/s  Peak Grad ; Mean: 3 mm(Hg)  SV (LVOT): 48 cm³  VTI;Mean: 1 mm(Hg)  JOSE Cont Eq (VTI): 1 41 cm squared  MV Peak A Juwan: 1600 mm/s  MV Peak E Juwan  Mean: 775 mm/s  MVA (PHT): 2 93 cm squared  PHT: 75 ms  Max P mm(Hg)  V Max: 2280 mm/s  Vmax: 2520 mm/s  RA Area: 14 7 cm squared  RA Volume: 34 cm³  TAPSE: 1 7 cm     [facility] Accredited [facility]     Prepared and electronically signed by  Reena Castellanos DO  Signed 01-Sep-2017 12:25:51,  Pulmonary       GI/Hepatic            Endo/Other  Diabetes well controlled type 2 , History of thyroid disease , Arthritis     GYN       Hematology   Musculoskeletal       Neurology   Psychology           Physical Exam    Airway    Mallampati score: II  TM Distance: >3 FB  Neck ROM: full     Dental       Cardiovascular  Rhythm: regular, Rate: normal,     Pulmonary  Breath sounds clear to auscultation,     Other Findings    Patient had sustained SVT upon examination at around 10 am  His Blood pressure was also elevated with systolic of 581 mmHg  Patient not complaining was chest pain or discomfort  Discussion with the cardiologist and hospitalist for optimization  I had a full discussion with dr Haynes Sessions about the risks of having this procedure late in the day  Anesthesia Plan  ASA Score- 4       Anesthesia Type- regional, spinal and epidural with ASA Monitors  Additional Monitors:   Airway Plan:           Induction- intravenous  Informed Consent- Anesthetic plan and risks discussed with patient  Informed consent obtained in the presence of the nursing staff and his daughter     Risks included prolonged ICU stay, cardiac arrythmias, bleeding and death     Cancer [facility]) colon   Cardiac disease pacemaker   Diabetes mellitus ([facility] [address] )    Hypertension    Trigeminal neuralgia    Disease of thyroid gland    Arthritis

## 2017-11-24 NOTE — CONSULTS
Consultation - Orthopedics   Sherryle Court 80 y o  male MRN: [id number]  Unit/Bed#: 2 [place] [address] Encounter: [id number]      Assessment/Plan     Assessment:  1) closed right hip 2 part intertrochanteric fracture  2) Paget's disease    Plan:  Patient presents with closed stable 2 part intertrochanteric fracture of the right hip  Operative and non operative treatment options discussed with the patient at bedside today  Risks and benefits of both discussed at length with the patient  The patient elected to undergo surgical intervention however would like to sign consents when his daughter arises  Will plan for surgical intervention today at 4:30pm with insertion of cephalomedullary nail  NPO  Bed rest  Continue neuro checks   Analgesia p r n  Labs reviewed  Type and screen ordered  X-rays reviewed  SLIM H&P acknowledged, will await cardio clearance for OR today  Cardio clearance requested by SLIM  Proceed to OR today for right hip cephalomedullary nailing 4:30 p m  pending cardiac clearance  History of Present Illness   Physician Requesting Consult: Sirisha Tucker MD  Reason for Consult / Principal Problem:  Right hip pain  HPI: Sherryle Court is a 80y o  year old male who presents with past medical history of trigeminal neurology a on carbamazepine and botulism toxin injection, complete heart block status post pacemaker placement, hypertension, impaired glucose tolerance, hypothyroidism, colon cancer status post colectomy, frequent fall, hyponatremia secondary to carbamazepine presents status post fall  Patient states he was at his family's visiting for [facility] when he went to get up and lost his balance and fell directly on his right side landing on his right hip  Patient states he did hit his head however he denies loss of consciousness  After the fall he had severe right hip pain and was unable to bear weight  He denies any other pain in his other extremities    Does have a history of falls and states that his balance has been getting worse however this is the 1st time he really hurt himself  Patient denies paresthesias in the right lower extremity  Inpatient consult to Orthopedic Surgery  Consult performed by: Ghislaine Nogueira  Consult ordered by: Carli Quiñones          Review of Systems   Constitutional: Negative  HENT: Negative  Eyes: Negative  Respiratory: Negative  Cardiovascular: Negative  Gastrointestinal: Negative  Musculoskeletal: Positive for arthralgias and gait problem  Right hip pain   Skin: Negative          Historical Information   Past Medical History:   Diagnosis Date    Arthritis     Cancer [facility] )     colon    Cardiac disease     pacemaker    Diabetes mellitus ([facility] )     Disease of thyroid gland     Hypertension     Trigeminal neuralgia      Past Surgical History:   Procedure Laterality Date    APPENDECTOMY      CARDIAC PACEMAKER PLACEMENT  2012    CATARACT EXTRACTION      COLON SURGERY  2013    HERNIA REPAIR      SKIN GRAFT       Social History   History   Alcohol Use No     History   Drug Use No     History   Smoking Status    Never Smoker   Smokeless Tobacco    Never Used     Family History: non-contributory    Meds/Allergies   current meds:   Current Facility-Administered Medications   Medication Dose Route Frequency    acetaminophen (TYLENOL) tablet 650 mg  650 mg Oral Q6H PRN    amLODIPine (NORVASC) tablet 5 mg  5 mg Oral Daily    aspirin chewable tablet 81 mg  81 mg Oral Daily    carBAMazepine (TEGretol) tablet 300 mg  300 mg Oral TID    docusate sodium (COLACE) capsule 100 mg  100 mg Oral BID    heparin (porcine) subcutaneous injection 5,000 Units  5,000 Units Subcutaneous Q8H [address]    HYDROmorphone (DILAUDID) 1 mg/mL injection 0 2 mg  0 2 mg Intravenous Q4H PRN    insulin lispro (HumaLOG) 100 units/mL subcutaneous injection 1-5 Units  1-5 Units Subcutaneous TID AC    levothyroxine tablet 75 mcg  75 mcg Oral Early Morning    lisinopril (ZESTRIL) tablet 5 mg  5 mg Oral HS    metoprolol succinate (TOPROL-XL) 24 hr tablet 100 mg  100 mg Oral Daily    ondansetron (ZOFRAN) injection 4 mg  4 mg Intravenous Q6H PRN    polyethylene glycol (MIRALAX) packet 17 g  17 g Oral Daily PRN    sodium chloride 0 9 % infusion  50 mL/hr Intravenous Continuous     Allergies   Allergen Reactions    Amoxicillin-Pot Clavulanate     Clindamycin Other (See Comments)    Dilantin  [Phenytoin Sodium Extended] Other (See Comments)    Levaquin [Levofloxacin]     Penicillins        Objective   Vitals: Blood pressure 142/62, pulse 90, temperature 99 °F (37 2 °C), temperature source Tympanic, resp  rate 18, height 5' 3" (1 6 m), weight 60 8 kg (134 lb 0 6 oz), SpO2 95 %  ,Body mass index is 23 74 kg/m²  Intake/Output Summary (Last 24 hours) at 11/24/17 0720  Last data filed at 11/24/17 0401   Gross per 24 hour   Intake              500 ml   Output              350 ml   Net              150 ml     I/O last 24 hours: In: 500 [IV Piggyback:500]  Out: 350 [Urine:350]    Invasive Devices     Peripheral Intravenous Line            Peripheral IV 11/23/17 Left Arm less than 1 day                Physical Exam   Constitutional: He is oriented to person, place, and time  He appears well-developed and well-nourished  HENT:   Head: Normocephalic and atraumatic  Eyes: EOM are normal    Neck: Neck supple  Pulmonary/Chest: Effort normal    Musculoskeletal:   See ortho exam   Neurological: He is alert and oriented to person, place, and time  Skin: Skin is warm and dry  Right Hip Exam     Tenderness   The patient is experiencing tenderness in the anterior, lateral and greater trochanter  Range of Motion   Extension: abnormal   Flexion: abnormal   Internal Rotation: abnormal   External Rotation: abnormal   Abduction: abnormal   Adduction: abnormal     Muscle Strength   Right hip normal muscle strength: Deferred secondary to injury      Other   Sensation: normal  Pulse: present    Comments:  Secondary survey:  All other extremities active and passive range of motion without pain  No crepitance appreciated on active and passive range of motion all other extremities joints  Right lower extremity:  Skin diffusely intact, no acute signs of trauma, swelling in the thigh however compartments remained soft, shortened externally rotated right lower extremity, + dorsiflexion plantar flexion extension of the great toe, sensation intact to light touch L2 through S1, foot warm dorsalis pedis pulse 2 +            Lab Results:   CBC:   Lab Results   Component Value Date    WBC 10 60 11/24/2017    HGB 9 3 (L) 11/24/2017    HCT 27 2 (L) 11/24/2017    MCV 89 11/24/2017     (L) 11/24/2017    MCH 30 1 11/24/2017    MCHC 34 0 11/24/2017    RDW 14 1 11/24/2017    MPV 8 2 (L) 11/24/2017    NRBC 0 11/24/2017     CMP:   Lab Results   Component Value Date     11/23/2017     11/23/2017    CO2 28 11/23/2017    ANIONGAP 9 11/23/2017    BUN 33 (H) 11/23/2017    CREATININE 1 33 (H) 11/23/2017    GLUCOSE 216 (H) 11/23/2017    CALCIUM 8 7 11/23/2017    EGFR 44 11/23/2017     PT/INR:   Lab Results   Component Value Date    INR 1 02 11/23/2017     Imaging Studies:  X-rays of the pelvis right hip and right knee reviewed by me  They demonstrate stable 2 part intertrochanteric fracture of the right hip with small lesser trochanteric fragment that is displaced  There is no fracture dislocation within the knee  There is degenerative change however  There are dense trabecular changes in the left hemipelvis and right hip area back in suggest underlying Paget's disease  No lytic or blastic lesions appreciated in the pelvis or hip  VTE Prophylaxis: Heparin    Code Status: Level 1 - Full Code  Advance Directive and Living Will: Yes    Power of :    POLST:      Counseling / Coordination of Care  Total floor / unit time spent today 25 minutes   Greater than 50% of total time was spent with the patient and / or family counseling and / or coordination of care  A description of the counseling / coordination of care:  History, physical exam, review of imaging, chart review, risk benefit discussion regarding operative and non operative treatment, risk of operative treatment, obtaining consent, discussion of plan of care    Risks and benefits of the surgery were discussed  Risks such as: bleeding, infection, neurovascular damage, non-union, malunion, avascular necrosis, fracture, need for reoperation, persistent pain, persistent limp, scaring, hardware failure, blood clot, pulmonary embolism, death, heart attack, and stroke were discussed  Pt's family/POA demonstrated understanding of these risks and wished to proceed to surgery  Consents were signed and placed into the chart  Cass NELSON    Division of Adult Reconstruction  Department of [facility]

## 2017-11-24 NOTE — ANESTHESIA PROCEDURE NOTES
Spinal Block    Patient location during procedure: OR  Start time: 11/24/2017 4:33 PM  Staffing  Anesthesiologist: Tristan Myles  Performed: anesthesiologist   Preanesthetic Checklist  Completed: patient identified, site marked, surgical consent, pre-op evaluation, timeout performed, IV checked, risks and benefits discussed and monitors and equipment checked  Spinal Block  Patient position: left lateral decubitus  Needle  Needle type: Tuohy   Needle gauge: 25 G  Needle length: 5 cm  Assessment  Sensory level: [id number] Assessment:  negative aspiration for heme and positive aspiration for clear CSF    Post-procedure:  site cleaned

## 2017-11-24 NOTE — H&P
Pt seen and examined in preop  Daughter and son in law at bedside  Questions addressed  Cardio clearance appreciated  Pt is high risk from cardio standpoint  Consents signed and placed into the chart  Proceed to OR for right hip TFN

## 2017-11-24 NOTE — PROGRESS NOTES
Progress Note - Orthopedics   Felicita Castorena 80 y o  male MRN: [id number]  Unit/Bed#: OR [place] Encounter: [id number]    Assessment:  1) POD#0 s/p R TFN  2) Paget's disease    Plan:  Transfer to ICU/Step down post op for overnight monitoring  Vancomycin 1g IV x 2 for 24 hours postop  DVT prophylaxis: Lovenox 30mg Sq Qdaily/SCD's/Ambulation  WBAT  PT/OT- WBAT  Analgesia PRN  Follow neuro exam for resolution of spinal  Neuro checks Q 4  Follow up AM labs  Continue guerrero for monitoring in ICU  Continue medical management per ICU/SLIM  Dressing- monitor for drainage, may leave dressing in place x 7 days  Discharge planning - pending progress with PT    Weight bearing:  Weight-bearing as tolerated    VTE Pharmacologic Prophylaxis: Enoxaparin (Lovenox) start tomorrow  VTE Mechanical Prophylaxis: sequential compression device    Subjective: Pt seen and examined in PACU, pain controlled  Event of surgery discussed with patient and patient's family  Vitals: Blood pressure (!) 172/73, pulse 80, temperature 98 2 °F (36 8 °C), temperature source Oral, resp  rate 18, height 5' 3" (1 6 m), weight 60 8 kg (134 lb 0 6 oz), SpO2 97 %  ,Body mass index is 23 74 kg/m²  Intake/Output Summary (Last 24 hours) at 11/24/17 1824  Last data filed at 11/24/17 1759   Gross per 24 hour   Intake                0 ml   Output              750 ml   Net             -750 ml       Invasive Devices     Peripheral Intravenous Line            Peripheral IV 11/23/17 Left Arm 1 day    Peripheral IV 11/24/17 Right Wrist less than 1 day          Arterial Line            Arterial Line 11/24/17 Left Radial less than 1 day          Drain            Urethral Catheter Latex 14 Fr  less than 1 day                Physical Exam: NAD  Ortho Exam: Dsg c/d/i, thigh soft,  No DF/PF/EHL,  No L3-S1 SILT, DP2+, foot warm      Lab, Imaging and other studies:  PO XR R hip: pending    Addie NELSON    Division of Adult Reconstruction  Department of Orthopaedic [facility]

## 2017-11-24 NOTE — CONSULTS
Consultation - Cardiology   Lesley Hernandez 80 y o  male MRN: [id number]  Unit/Bed#: [address] [place] [address] Encounter: [id number]  11/24/17  8:48 AM          Physician Requesting Consult: Leeanna Curry MD  Reason for Consult / Principal Problem: Preoperative risk stratification    Assessment:  1  Preoperative risk stratification prior to undergoing hip fracture repair - patient is at higher risk due to advanced age and poor functional capacity  He has no evidence of CHF and recent echocardiogram showed only mild to moderate valve disease including aortic and mitral stenosis; electrolytes and renal function within normal limits  He is currently medically optimized for procedure and may proceed as planned  - Will monitor closely postop  If any shortness of breath or chest discomfort develop, troponin should be obtained  2  SSS - s/p pacemaker - appears to be functioning well on ECG  3  Hypertension - BP significantly elevated  May be pain response  Continue home medications  Consider interarterial monitoring during procedure  Will give additional Norvasc dose now  4  DM - insulin management per hospitalist   5  Tachycardia - review of telemetry showed a paced rhythm at ~100 bpm   Continue telemetry monitoring  Plan:  As above  Discussed with patient's family and anesthesiologist at bedside  Patient will be monitored in stepdown unit post procedure  History of Present Illness   HPI: Lesley Hernandez is a 80y o  year old male who presents with a right hip intertochanteric fracture  Patient was at thanksgiving dinner and fell  He denies any loss of consciousness  He denies any focal neurological deficit, lightheadedness, chest pain or shortness of breath, palpitations before or after the fall  He has a history of complete heart block and has a permanent pacemaker that was inserted in 2012    Echocardiogram was done on 8/31/17 which showed a normal EF, grade 1 diastolic dysfunction with elevated LA filling pressures, mild to moderate valve disease  Patient ambulates with a walker  He has had previous surgeries without complications including colon surgery in 2013  No previous complications with anesthesia  He has had no events on telemetry until today when he was noted to be tachycardic on telemetry monitoring  Although a rate of 190 was recorded, review of telemetry reveals a rate significantly lower (~100 bpm)  Review of Systems:    Review of Systems   Constitutional: Negative for chills and fever  HENT: Positive for hearing loss  Eyes: Negative  Respiratory: Negative for shortness of breath  Cardiovascular: Negative for chest pain, palpitations and leg swelling  Gastrointestinal: Negative  Endocrine: Negative  Genitourinary: Negative  Musculoskeletal: Positive for gait problem  Hematological: Negative  Psychiatric/Behavioral: Negative  Historical Information   Past Medical History:   Diagnosis Date    Arthritis     Cancer [facility])     colon    Cardiac disease     pacemaker    [facility]     Disease of thyroid gland     Hypertension     Trigeminal neuralgia      Past Surgical History:   Procedure Laterality Date    APPENDECTOMY      CARDIAC PACEMAKER PLACEMENT  2012    CATARACT EXTRACTION      COLON SURGERY  2013    HERNIA REPAIR      SKIN GRAFT       History   Alcohol Use No     History   Drug Use No     History   Smoking Status    Never Smoker   Smokeless Tobacco    Never Used       Family History: History reviewed  No pertinent family history      Meds/Allergies   current meds:   Current Facility-Administered Medications   Medication Dose Route Frequency    acetaminophen (TYLENOL) tablet 650 mg  650 mg Oral Q6H PRN    amLODIPine (NORVASC) tablet 5 mg  5 mg Oral Daily    aspirin chewable tablet 81 mg  81 mg Oral Daily    carBAMazepine (TEGretol) tablet 300 mg  300 mg Oral TID    docusate sodium (COLACE) capsule 100 mg  100 mg Oral BID  heparin (porcine) subcutaneous injection 5,000 Units  5,000 Units Subcutaneous Q8H [address]    HYDROmorphone (DILAUDID) 1 mg/mL injection 0 2 mg  0 2 mg Intravenous Q4H PRN    insulin lispro (HumaLOG) 100 units/mL subcutaneous injection 1-5 Units  1-5 Units Subcutaneous TID AC    levothyroxine tablet 75 mcg  75 mcg Oral Early Morning    lisinopril (ZESTRIL) tablet 5 mg  5 mg Oral HS    metoprolol succinate (TOPROL-XL) 24 hr tablet 100 mg  100 mg Oral Daily    ondansetron (ZOFRAN) injection 4 mg  4 mg Intravenous Q6H PRN    polyethylene glycol (MIRALAX) packet 17 g  17 g Oral Daily PRN    sodium chloride 0 9 % infusion  50 mL/hr Intravenous Continuous    vancomycin (VANCOCIN) IVPB (premix) 1,000 mg  1,000 mg Intravenous Once     Allergies   Allergen Reactions    Amoxicillin-Pot Clavulanate     Clindamycin Other (See Comments)    Dilantin  [Phenytoin Sodium Extended] Other (See Comments)    Levaquin [Levofloxacin]     Penicillins        Objective   Vitals: Blood pressure (!) 200/86, pulse 93, temperature 98 2 °F (36 8 °C), temperature source Oral, resp  rate 18, height 5' 3" (1 6 m), weight 60 8 kg (134 lb 0 6 oz), SpO2 97 %  , Body mass index is 23 74 kg/m²  Physical Exam   Constitutional: No distress  HENT:   Mouth/Throat: Oropharynx is clear  Eyes: Conjunctivae are normal  Pupils are equal, round, and reactive to light  Neck: Neck supple  Cardiovascular: Normal rate and regular rhythm  Murmur heard  Systolic murmur is present with a grade of 2/6  at the upper left sternal border  Pulmonary/Chest: Breath sounds normal  He has no wheezes  He has no rales  Abdominal: Soft  He exhibits no distension  There is no tenderness  Musculoskeletal: He exhibits no edema  Neurological: He is alert and oriented to person, place, and time  Skin: Skin is warm and dry  No rash noted         Lab Results:     Troponins:       CBC with diff:   Results from last 7 days  Lab Units 11/24/17  0613 11/23/17  1539   WBC Thousand/uL 10 60 7 90   HEMOGLOBIN g/dL 9 3* 11 7*   HEMATOCRIT % 27 2* 34 8*   MCV fL 89 89   PLATELETS Thousands/uL 127* 153   MCH pg 30 1 29 9   MCHC g/dL 34 0 33 6   RDW % 14 1 14 2   MPV fL 8 2* 7 9*   NRBC AUTO /100 WBCs 0 0       CMP:   Results from last 7 days  Lab Units 11/24/17  0709 11/23/17  1539   SODIUM mmol/L 140 138   POTASSIUM mmol/L 4 6 4 5   CHLORIDE mmol/L 105 101   CO2 mmol/L 27 28   ANION GAP mmol/L 8 9   BUN mg/dL 36* 33*   CREATININE mg/dL 1 22 1 33*   GLUCOSE RANDOM mg/dL 211* 216*   CALCIUM mg/dL 8 1* 8 7   EGFR ml/min/1 73sq m 49 44       Magnesium:   Results from last 7 days  Lab Units 11/24/17  0709   MAGNESIUM mg/dL 2 0       Coags:   Results from last 7 days  Lab Units 11/23/17  1539   PTT seconds 24   INR  1 02       Lipid Profile:         Cardiac testing:   Echocardiogram was done on 8/31/17 which showed a normal EF, grade 1 diastolic dysfunction with elevated LA filling pressures, mild to moderate valve disease  EKG: Personally reviewed: AV dual paced rhythm    Imaging: I have personally reviewed pertinent reports

## 2017-11-24 NOTE — SOCIAL WORK
SW referral received to assist with DCP  STR placement is being recommended  Met with pt and family at the bedside to discuss plans  Pt and family are agreeable to STR  Pt is currently a long-term resident at [facility] ([facility])  The plan is for pt to rehab at [facility]  Referral made and pt accepted  Awaiting discharge order  SW will follow to monitor needs and assist with discharge needs

## 2017-11-24 NOTE — CASE MANAGEMENT
Initial Clinical Review    Admission: Date/Time/Statement: 11/23/17 @ 1646     Orders Placed This Encounter   Procedures    Inpatient Admission (expected length of stay for this patient is greater than two midnights)     Standing Status:   Standing     Number of Occurrences:   1     Order Specific Question:   Admitting Physician     Answer:   Amina Carpenter [[id number]]     Order Specific Question:   Level of Care     Answer:   Med Surg [16]     Order Specific Question:   Estimated length of stay     Answer:   More than 2 Midnights     Order Specific Question:   Certification     Answer:   I certify that inpatient services are medically necessary for this patient for a duration of greater than two midnights  See H&P and MD Progress Notes for additional information about the patient's course of treatment  ED: Date/Time/Mode of Arrival:   ED Arrival Information     Expected Arrival Acuity Means of Arrival Escorted By Service Admission Type    - 11/23/2017 15:17 Urgent Ambulance [facility] [place] [facility] General Medicine Urgent    Arrival Complaint    fall      Chief Complaint:   Chief Complaint   Patient presents with    Leg Pain     per EMS report, pt getting out of his chair to get his walker & fell  c/o right upper leg pain  fall occurred approx 1/2 hour ago  states he fell onto his right side , states hit shoulder , "unsure of head strike  denies LOC/denies head/neck/back pain  History of Illness:   Patient presents for evaluation after a fall  Patient was getting up from the couch and grabbing his walker when he lost his balance and fell on his right hip and shoulder  No LOC  Complains of right hip pain     ED Vital Signs:   ED Triage Vitals [11/23/17 1533]   Temperature Pulse Respirations Blood Pressure SpO2   (!) 97 4 °F (36 3 °C) 71 20 164/72 96 %      Temp Source Heart Rate Source Patient Position - Orthostatic VS BP Location FiO2 (%)   Tympanic Monitor Lying Right arm --      Pain Score       No Pain        Wt Readings from Last 1 Encounters:   11/23/17 60 8 kg (134 lb 0 6 oz)   Vital Signs (abnormal):   /77  Abnormal Labs/Diagnostic Test Results:   HGB 11 7 BUN 33 CR 1 33 GLUC 216 GFR 44   XRAY R HIP/PELVIS=RIGHT intertrochanteric fracture and displacement of the lesser trochanter medially  This is seen on a background of coarse and heterogeneous reticular changes within the RIGHT hip as well as throughout the LEFT hemipelvis  These findings were present previously and 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  CT HEAD=No acute intracranial normality or significant change from 8/30/2017  CT CERVICAL SPINE=No acute fracture or traumatic listhesis  Moderate spondylosis  R KNEE XRAY=No acute osseous abnormality  CXR=No active disease  ED Treatment:   Medication Administration from 11/23/2017 1517 to 11/23/2017 1714       Date/Time Order Dose Route Action Action by Comments     11/23/2017 1658 sodium chloride 0 9 % bolus 500 mL 0 mL Intravenous Stopped Adria Osuna RN      11/23/2017 1546 sodium chloride 0 9 % bolus 500 mL 500 mL Intravenous New [address] Emily Marrero RN      11/23/2017 1546 morphine (PF) 4 mg/mL injection 4 mg 4 mg Intravenous Given Emily Marrero RN       Past Medical/Surgical History:    Active Ambulatory Problems     Diagnosis Date Noted    Hyponatremia 08/30/2017    Frequent falls 08/30/2017    Cardiac enzymes elevated 08/30/2017    Trigeminal neuralgia     Cancer [facility])      Resolved Ambulatory Problems     Diagnosis Date Noted    No Resolved Ambulatory Problems     Past Medical History:   Diagnosis Date    Arthritis     Cancer [facility])     Cardiac disease     Diabetes mellitus ([facility] )     Disease of thyroid gland     Hypertension     Trigeminal neuralgia    Admitting Diagnosis: Leg pain [M79 [id number]]  Closed fracture of right hip, initial encounter ([facility] ) [[id number]]  Age/Sex: 80 y o  male  Assessment/Plan:   Principal Problem:    Closed fracture of right hip (HCC)  Active Problems:    Frequent falls    Leg pain    Cardiac disease    Hypertension    Disease of thyroid gland    Diabetes mellitus (HCC)    Arthritis  Assessment/Plan:  1  Right intertrochanteric fracture-discussed with patient and family that patient will need surgical intervention  Will keep NPO after midnight  Pain control  Orthopedic evaluation, discussed with Dr Haynes Sessions  Cardiology evaluation for perioperative management  2  Status post fall, likely mechanical; history of recurrent fall-monitor blood pressure  Check EKG  Monitor blood pressure  Prior Echo and carotid Doppler noted  Telemetry  3  Uncontrolled hypertension-likely secondary to pain  Continue pain control  Resume home medication including Norvasc metoprolol and lisinopril  Monitor blood pressure  4  Acute kidney injury on CKD stage 3 (baseline creatinine 0 9-1 1)-monitor renal function  5  History of impaired glucose tolerance-blood sugar noted to be elevated at the time of presentation likely stress response  Will check Accu-Chek, hemoglobin A1c  Monitor  6  Hypothyroidism-on Synthroid  7  History of trigeminal neuralgia, intractable-on carbamazepine  8  History of complete heart block status post pacemaker placement  9  History of SIADH-sodium level within normal limit  Will continue to monitor  Avoid hypotonic solution  Fluid restriction  10  History of colon cancer status post colectomy in remission   Anticipated Length of Stay:  Patient will be admitted on an Inpatient basis with an anticipated length of stay of  > 2 midnights     Justification for Hospital Stay:   Right hip fracture requiring surgical intervention   Admission Orders:  TELEMETRY  PT/OT EVAL & [address] ORTHO  ACCUCHECKS WITH COVERAGE SCALE  FLUID RESTRICTION @ 1500CC  INCENTIVE SPIROMETRY  Scheduled Meds:   amLODIPine 5 mg Oral Daily   aspirin 81 mg Oral Daily   carBAMazepine 300 mg Oral TID   docusate sodium 100 mg Oral BID   heparin (porcine) 5,000 Units Subcutaneous Q8H [address]   insulin lispro 1-5 Units Subcutaneous TID AC   levothyroxine 75 mcg Oral Early Morning   lisinopril 5 mg Oral HS   metoprolol succinate 100 mg Oral Daily   vancomycin 1,000 mg Intravenous Once     Continuous Infusions:   sodium chloride 50 mL/hr Last Rate: 50 mL/hr (11/23/17 2052)     PRN Meds:   acetaminophen    HYDROmorphone    ondansetron    polyethylene glycol

## 2017-11-24 NOTE — ANESTHESIA POSTPROCEDURE EVALUATION
Post-Op Assessment Note      CV Status:  Stable    Mental Status:  Alert and awake    Hydration Status:  Euvolemic    PONV Controlled:  Controlled    Airway Patency:  Patent    Post Op Vitals Reviewed: Yes          Staff: Anesthesiologist           BP  95/48   Temp   97 9   Pulse  76   Resp   24   SpO2   95

## 2017-11-25 PROBLEM — I73.9 PERIPHERAL VASCULAR DISEASE (HCC): Status: ACTIVE | Noted: 2017-11-25

## 2017-11-25 PROBLEM — E87.1 HYPONATREMIA: Status: RESOLVED | Noted: 2017-08-30 | Resolved: 2017-11-25

## 2017-11-25 PROBLEM — N62 GYNECOMASTIA: Status: ACTIVE | Noted: 2017-11-25

## 2017-11-25 PROBLEM — Z95.0 PACEMAKER: Status: ACTIVE | Noted: 2017-11-25

## 2017-11-25 PROBLEM — D49.0 NEOPLASM OF DIGESTIVE SYSTEM: Status: ACTIVE | Noted: 2017-11-25

## 2017-11-25 PROBLEM — C18.9 MALIGNANT NEOPLASM OF COLON (HCC): Status: ACTIVE | Noted: 2017-11-25

## 2017-11-25 PROBLEM — I87.2 VENOUS INSUFFICIENCY: Chronic | Status: ACTIVE | Noted: 2017-11-25

## 2017-11-25 PROBLEM — C18.9 MALIGNANT NEOPLASM OF COLON (HCC): Chronic | Status: ACTIVE | Noted: 2017-11-25

## 2017-11-25 PROBLEM — I73.9 PERIPHERAL VASCULAR DISEASE (HCC): Chronic | Status: ACTIVE | Noted: 2017-11-25

## 2017-11-25 PROBLEM — I87.2 VENOUS INSUFFICIENCY: Status: ACTIVE | Noted: 2017-11-25

## 2017-11-25 PROBLEM — G89.4 CHRONIC PAIN DISORDER: Status: ACTIVE | Noted: 2017-11-25

## 2017-11-25 PROBLEM — I35.0 AORTIC VALVE STENOSIS: Chronic | Status: ACTIVE | Noted: 2017-11-25

## 2017-11-25 PROBLEM — Z95.0 PACEMAKER: Chronic | Status: ACTIVE | Noted: 2017-11-25

## 2017-11-25 PROBLEM — D49.0 NEOPLASM OF DIGESTIVE SYSTEM: Chronic | Status: ACTIVE | Noted: 2017-11-25

## 2017-11-25 PROBLEM — D62 ACUTE BLOOD LOSS ANEMIA: Status: ACTIVE | Noted: 2017-11-25

## 2017-11-25 PROBLEM — N62 GYNECOMASTIA: Chronic | Status: ACTIVE | Noted: 2017-11-25

## 2017-11-25 PROBLEM — I35.0 AORTIC VALVE STENOSIS: Status: ACTIVE | Noted: 2017-11-25

## 2017-11-25 PROBLEM — I10 BENIGN ESSENTIAL HYPERTENSION: Chronic | Status: ACTIVE | Noted: 2017-11-25

## 2017-11-25 PROBLEM — I10 BENIGN ESSENTIAL HYPERTENSION: Status: ACTIVE | Noted: 2017-11-25

## 2017-11-25 PROBLEM — G89.4 CHRONIC PAIN DISORDER: Chronic | Status: ACTIVE | Noted: 2017-11-25

## 2017-11-25 PROBLEM — R10.13 DYSPEPSIA: Status: ACTIVE | Noted: 2017-11-25

## 2017-11-25 PROBLEM — R74.8 CARDIAC ENZYMES ELEVATED: Status: RESOLVED | Noted: 2017-08-30 | Resolved: 2017-11-25

## 2017-11-25 LAB
ALBUMIN SERPL BCP-MCNC: 2.6 G/DL (ref 3.5–5)
ALP SERPL-CCNC: 93 U/L (ref 46–116)
ALT SERPL W P-5'-P-CCNC: 17 U/L (ref 12–78)
ANION GAP SERPL CALCULATED.3IONS-SCNC: 8 MMOL/L (ref 4–13)
AST SERPL W P-5'-P-CCNC: 15 U/L (ref 5–45)
BASOPHILS # BLD AUTO: 0.1 THOUSANDS/ΜL (ref 0–0.1)
BASOPHILS NFR BLD AUTO: 1 % (ref 0–1)
BILIRUB DIRECT SERPL-MCNC: 0.2 MG/DL (ref 0–0.2)
BILIRUB SERPL-MCNC: 0.4 MG/DL (ref 0.2–1)
BUN SERPL-MCNC: 30 MG/DL (ref 5–25)
CALCIUM SERPL-MCNC: 8 MG/DL (ref 8.3–10.1)
CHLORIDE SERPL-SCNC: 108 MMOL/L (ref 100–108)
CO2 SERPL-SCNC: 25 MMOL/L (ref 21–32)
CREAT SERPL-MCNC: 0.89 MG/DL (ref 0.6–1.3)
EOSINOPHIL # BLD AUTO: 0.1 THOUSAND/ΜL (ref 0–0.61)
EOSINOPHIL NFR BLD AUTO: 1 % (ref 0–6)
ERYTHROCYTE [DISTWIDTH] IN BLOOD BY AUTOMATED COUNT: 13.9 % (ref 11.6–15.1)
GFR SERPL CREATININE-BSD FRML MDRD: 71 ML/MIN/1.73SQ M
GLUCOSE SERPL-MCNC: 212 MG/DL (ref 65–140)
GLUCOSE SERPL-MCNC: 231 MG/DL (ref 65–140)
GLUCOSE SERPL-MCNC: 242 MG/DL (ref 65–140)
GLUCOSE SERPL-MCNC: 242 MG/DL (ref 65–140)
GLUCOSE SERPL-MCNC: 334 MG/DL (ref 65–140)
HCT VFR BLD AUTO: 21.7 % (ref 42–52)
HCT VFR BLD AUTO: 25.4 % (ref 42–52)
HGB BLD-MCNC: 7.3 G/DL (ref 14–18)
HGB BLD-MCNC: 8.7 G/DL (ref 14–18)
LYMPHOCYTES # BLD AUTO: 2.3 THOUSANDS/ΜL (ref 0.6–4.47)
LYMPHOCYTES NFR BLD AUTO: 18 % (ref 14–44)
MAGNESIUM SERPL-MCNC: 1.9 MG/DL (ref 1.6–2.6)
MCH RBC QN AUTO: 29.6 PG (ref 27–31)
MCHC RBC AUTO-ENTMCNC: 33.7 G/DL (ref 31.4–37.4)
MCV RBC AUTO: 88 FL (ref 82–98)
MONOCYTES # BLD AUTO: 1.2 THOUSAND/ΜL (ref 0.17–1.22)
MONOCYTES NFR BLD AUTO: 9 % (ref 4–12)
MRSA NOSE QL CULT: NORMAL
NEUTROPHILS # BLD AUTO: 8.8 THOUSANDS/ΜL (ref 1.85–7.62)
NEUTS SEG NFR BLD AUTO: 71 % (ref 43–75)
PLATELET # BLD AUTO: 127 THOUSANDS/UL (ref 130–400)
PMV BLD AUTO: 8.8 FL (ref 8.9–12.7)
POTASSIUM SERPL-SCNC: 4.2 MMOL/L (ref 3.5–5.3)
PROT SERPL-MCNC: 5.6 G/DL (ref 6.4–8.2)
RBC # BLD AUTO: 2.47 MILLION/UL (ref 4.7–6.1)
SODIUM SERPL-SCNC: 141 MMOL/L (ref 136–145)
T4 FREE SERPL-MCNC: 0.89 NG/DL (ref 0.76–1.46)
TSH SERPL DL<=0.05 MIU/L-ACNC: 0.15 UIU/ML (ref 0.36–3.74)
WBC # BLD AUTO: 12.5 THOUSAND/UL (ref 4.8–10.8)

## 2017-11-25 PROCEDURE — G8978 MOBILITY CURRENT STATUS: HCPCS

## 2017-11-25 PROCEDURE — 80076 HEPATIC FUNCTION PANEL: CPT | Performed by: INTERNAL MEDICINE

## 2017-11-25 PROCEDURE — G8979 MOBILITY GOAL STATUS: HCPCS

## 2017-11-25 PROCEDURE — 80048 BASIC METABOLIC PNL TOTAL CA: CPT | Performed by: ORTHOPAEDIC SURGERY

## 2017-11-25 PROCEDURE — P9021 RED BLOOD CELLS UNIT: HCPCS

## 2017-11-25 PROCEDURE — 85025 COMPLETE CBC W/AUTO DIFF WBC: CPT | Performed by: ORTHOPAEDIC SURGERY

## 2017-11-25 PROCEDURE — 82948 REAGENT STRIP/BLOOD GLUCOSE: CPT

## 2017-11-25 PROCEDURE — 84443 ASSAY THYROID STIM HORMONE: CPT | Performed by: INTERNAL MEDICINE

## 2017-11-25 PROCEDURE — 85018 HEMOGLOBIN: CPT | Performed by: NURSE PRACTITIONER

## 2017-11-25 PROCEDURE — 84439 ASSAY OF FREE THYROXINE: CPT | Performed by: INTERNAL MEDICINE

## 2017-11-25 PROCEDURE — 85014 HEMATOCRIT: CPT | Performed by: NURSE PRACTITIONER

## 2017-11-25 PROCEDURE — 30233N1 TRANSFUSION OF NONAUTOLOGOUS RED BLOOD CELLS INTO PERIPHERAL VEIN, PERCUTANEOUS APPROACH: ICD-10-PCS | Performed by: STUDENT IN AN ORGANIZED HEALTH CARE EDUCATION/TRAINING PROGRAM

## 2017-11-25 PROCEDURE — 97110 THERAPEUTIC EXERCISES: CPT

## 2017-11-25 PROCEDURE — 83735 ASSAY OF MAGNESIUM: CPT | Performed by: INTERNAL MEDICINE

## 2017-11-25 PROCEDURE — 97167 OT EVAL HIGH COMPLEX 60 MIN: CPT

## 2017-11-25 PROCEDURE — 97163 PT EVAL HIGH COMPLEX 45 MIN: CPT

## 2017-11-25 PROCEDURE — G8987 SELF CARE CURRENT STATUS: HCPCS

## 2017-11-25 PROCEDURE — G8988 SELF CARE GOAL STATUS: HCPCS

## 2017-11-25 RX ORDER — ASCORBIC ACID 500 MG
500 TABLET ORAL 2 TIMES DAILY
Status: DISCONTINUED | OUTPATIENT
Start: 2017-11-25 | End: 2017-11-29 | Stop reason: HOSPADM

## 2017-11-25 RX ORDER — AMLODIPINE BESYLATE 5 MG/1
5 TABLET ORAL DAILY
Status: DISCONTINUED | OUTPATIENT
Start: 2017-11-25 | End: 2017-11-25

## 2017-11-25 RX ORDER — MAGNESIUM SULFATE HEPTAHYDRATE 40 MG/ML
2 INJECTION, SOLUTION INTRAVENOUS ONCE
Status: COMPLETED | OUTPATIENT
Start: 2017-11-25 | End: 2017-11-25

## 2017-11-25 RX ORDER — AMLODIPINE BESYLATE 5 MG/1
5 TABLET ORAL DAILY
Status: DISCONTINUED | OUTPATIENT
Start: 2017-11-25 | End: 2017-11-29 | Stop reason: HOSPADM

## 2017-11-25 RX ORDER — ACETAMINOPHEN 325 MG/1
650 TABLET ORAL EVERY 6 HOURS SCHEDULED
Status: DISCONTINUED | OUTPATIENT
Start: 2017-11-25 | End: 2017-11-29 | Stop reason: HOSPADM

## 2017-11-25 RX ORDER — LISINOPRIL 5 MG/1
5 TABLET ORAL
Status: DISCONTINUED | OUTPATIENT
Start: 2017-11-25 | End: 2017-11-29 | Stop reason: HOSPADM

## 2017-11-25 RX ORDER — METOPROLOL SUCCINATE 100 MG/1
100 TABLET, EXTENDED RELEASE ORAL DAILY
Status: DISCONTINUED | OUTPATIENT
Start: 2017-11-25 | End: 2017-11-29 | Stop reason: HOSPADM

## 2017-11-25 RX ADMIN — MAGNESIUM SULFATE HEPTAHYDRATE 2 G: 40 INJECTION, SOLUTION INTRAVENOUS at 06:33

## 2017-11-25 RX ADMIN — INSULIN LISPRO 4 UNITS: 100 INJECTION, SOLUTION INTRAVENOUS; SUBCUTANEOUS at 12:41

## 2017-11-25 RX ADMIN — DOCUSATE SODIUM 100 MG: 100 CAPSULE, LIQUID FILLED ORAL at 18:05

## 2017-11-25 RX ADMIN — ACETAMINOPHEN 650 MG: 325 TABLET, FILM COATED ORAL at 06:33

## 2017-11-25 RX ADMIN — SODIUM CHLORIDE 250 ML: 0.9 INJECTION, SOLUTION INTRAVENOUS at 00:12

## 2017-11-25 RX ADMIN — OXYCODONE HYDROCHLORIDE AND ACETAMINOPHEN 500 MG: 500 TABLET ORAL at 18:04

## 2017-11-25 RX ADMIN — HYDROMORPHONE HYDROCHLORIDE 0.5 MG: 2 INJECTION, SOLUTION INTRAMUSCULAR; INTRAVENOUS; SUBCUTANEOUS at 05:26

## 2017-11-25 RX ADMIN — CARBAMAZEPINE 300 MG: 200 TABLET ORAL at 21:22

## 2017-11-25 RX ADMIN — INSULIN LISPRO 2 UNITS: 100 INJECTION, SOLUTION INTRAVENOUS; SUBCUTANEOUS at 08:00

## 2017-11-25 RX ADMIN — VANCOMYCIN HYDROCHLORIDE 1000 MG: 1 INJECTION, SOLUTION INTRAVENOUS at 04:20

## 2017-11-25 RX ADMIN — OXYCODONE HYDROCHLORIDE AND ACETAMINOPHEN 500 MG: 500 TABLET ORAL at 09:17

## 2017-11-25 RX ADMIN — LEVOTHYROXINE SODIUM 75 MCG: 75 TABLET ORAL at 05:27

## 2017-11-25 RX ADMIN — ACETAMINOPHEN 650 MG: 325 TABLET, FILM COATED ORAL at 12:38

## 2017-11-25 RX ADMIN — ASPIRIN 81 MG 81 MG: 81 TABLET ORAL at 09:00

## 2017-11-25 RX ADMIN — CARBAMAZEPINE 300 MG: 200 TABLET ORAL at 16:21

## 2017-11-25 RX ADMIN — INSULIN LISPRO 2 UNITS: 100 INJECTION, SOLUTION INTRAVENOUS; SUBCUTANEOUS at 21:24

## 2017-11-25 RX ADMIN — LISINOPRIL 5 MG: 5 TABLET ORAL at 21:23

## 2017-11-25 RX ADMIN — AMLODIPINE BESYLATE 5 MG: 5 TABLET ORAL at 09:18

## 2017-11-25 RX ADMIN — CARBAMAZEPINE 300 MG: 200 TABLET ORAL at 09:19

## 2017-11-25 RX ADMIN — Medication 1 TABLET: at 09:16

## 2017-11-25 RX ADMIN — INSULIN LISPRO 2 UNITS: 100 INJECTION, SOLUTION INTRAVENOUS; SUBCUTANEOUS at 16:24

## 2017-11-25 RX ADMIN — ENOXAPARIN SODIUM 30 MG: 30 INJECTION SUBCUTANEOUS at 09:00

## 2017-11-25 RX ADMIN — DOCUSATE SODIUM 100 MG: 100 CAPSULE, LIQUID FILLED ORAL at 09:18

## 2017-11-25 RX ADMIN — METOPROLOL SUCCINATE 100 MG: 100 TABLET, FILM COATED, EXTENDED RELEASE ORAL at 09:16

## 2017-11-25 RX ADMIN — ACETAMINOPHEN 650 MG: 325 TABLET, FILM COATED ORAL at 18:04

## 2017-11-25 NOTE — PHYSICIAN ADVISOR
This patient is a 80 y o  y/o male who is admitted to the hospital for Leg Pain (per EMS report, pt getting out of his chair to get his walker & fell  c/o right upper leg pain  fall occurred approx 1/2 hour ago  states he fell onto his right side , states hit shoulder , "unsure of head strike  denies LOC/denies head/neck/back pain  )   The patient presented to the ED on 11/23/17 at 1517 and was admitted to the hospital on 11/23/2017 1520  History of Present Illness includes: The patient presented after a fall at home and complained of leg pain  He was trying to ambulate with his walker when he lost his balance and fell on his right side  He developed right sided pain was unable to bear weight  X-ray of the right hip showed a right intertrochanteric fracture    Vital signs in the ER are as follows   ED Triage Vitals [11/23/17 1533]   Temperature Pulse Respirations Blood Pressure SpO2   (!) 97 4 °F (36 3 °C) 71 20 164/72 96 %      Temp Source Heart Rate Source Patient Position - Orthostatic VS BP Location FiO2 (%)   Tympanic Monitor Lying Right arm --      Pain Score       No Pain         On exam, breath sounds are decreased  There is decreased range of motion in the right hip and tenderness to the right hip  Labs showed hemoglobin 11 7 a creatinine of 1 33  The patient is being admitted with a worried intertrochanteric fracture  The plan of care includes NPO, pain control, orthopedic consultation and cardiology consultation for clearance for likely surgical management  This patient is appropriate for inpatient admission as his length of stay is expected to be a least two midnights   Continued hospitalization is necessary to ensure stabilization of his clinical status as well as to monitor this patient post surgery in the ICU in this patient of advanced age and significant comorbidities

## 2017-11-25 NOTE — PROGRESS NOTES
Daily Progress Note - Critical Care/ Angeline Fairly 80 y o  male MRN: [id number]  Unit/Bed#: ICU 05 Encounter: [id number]    ______________________________________________________________________  Assessment:   Principal Problem:    Closed fracture of right hip (HCC)  Active Problems:    Frequent falls    Leg pain    Cardiac disease    Hypertension    Disease of thyroid gland    Diabetes mellitus ([facility] [address] )    Arthritis  Resolved Problems:    * No resolved hospital problems  *    Plan:    Neuro:   · Alert and oriented x1 but redirectable  Likely 2/2 to pain medication and sundowning  Continue to redirect patient  Up and out of bed and by window when possible  Continue q 4hr neuro checks  · Delirium: CAM ICU positive no   · Pain controlled with:  Oxycodone 5 milligrams p r n  for moderate pain, oxycodone 10 milligrams q 4 hours p r n  for severe pain, Dilaudid 0 5 milligrams q 3 hours p r n  for breakthrough pain  Zofran 4 milligrams q 6 hours p r n  for nausea vomiting  · Pain score: mild pain well controlled overnight  · Regulate sleep/wake cycle  · Trigeminal neuralgia:  Continue carbamazepine 300 milligrams t i d     CV:   · HTN:  Continue Norvasc 5 milligrams daily, consider restarting lisinopril 5 milligrams  · H/o sick sinus syndrome:  S/p pacemaker:  Continuous cardiopulmonary monitoring  · Rhythm: NSR  · Follow rhythm on telemetry    Pulm:   · No acute issues  · Incentive spirometry to be used throughout the day       GI:   · No acute issues  · Nutrition/diet plan:  Cardiac diet with salt restriction 2 grams  · Stress ulcer prophylaxis: No prophylaxis needed  · Bowel regimen: Colace  · Last BM:  11/23/17    FEN:   · IVF NS increased to 75 cc/hour   · BMP pending this a m    · Fluid/Diuretic plan:  S/p 250 cc bolus  · Goal 24 hour fluid balance:  Net negative  · Electrolytes repleted: yes  · Goal: K >4 0, Mag >2 0, and Phos >3 0      :   · Decreased urinary output s/p 250 cc NS bolus and increase from 50 to 75 cc per hour NS  Patient has urine output of 30-35  · Indwelling Boothe present: yes   · Urinary catheter still needed for Strict I and O in a critically ill patient  ID:   · White blood cell count mildly elevated overnight this is likely 2/2 to recent hip fracture and subsequent surgical intervention  · Trend temps and WBC count    Heme:   · Post-op CBC showed H/H of 7 3/21 5 down from previous lab work  Will continue to trend with am CBC unless there is acute clinical change  H/H 7 3 and 21 7  Will not transfuse at this time  · Trend hgb and plts  · Transfuse as needed for goal hgb >7    Endo:   · DM type 2: Glucose low 200s overnight  Patient increased to algorithm 2  Continue Accu-Cheks q a c  q h s  hyperglycemia could be due to recent trauma and subsequent surgery  Continue to monitor increased algorithm as needed  · Hypothyroidism:  TSH pending this a m  continue with levothyroxine 75 mcg daily  Msk/Skin:  · S/p right TFN postop day # 1:  Pain management as above, will follow up a m  labs, continue to monitor dressing for discharge  · Mobility goal:  WBAT  · PT consult: yes  · OT consult: yes  · Frequent turning and off-loading    Family:  · Family updated within 24 hours: yes   · Family meeting planned today: no     Lines:  · Urethral catheter 14 French  · Arterial line left radial  · Peripheral IV 18 gauge left arm, 16 gauge right wrist    VTE Prophylaxis:  · Pharmacologic Prophylaxis: Enoxaparin (Lovenox)  · Mechanical Prophylaxis: sequential compression device    Disposition:  Transfer to telemetry    Code Status: Level 1 - Full Code    Counseling / Coordination of Care  Total time spent today 35 minutes  Greater than 50% of total time was spent with the patient and / or family counseling and / or coordination of care  A description of the counseling / coordination of care: See above assessment/plan and attending attestation    Patient has diagnosis requiring critical care evaluation management   ______________________________________________________________________    HPI/24hr events:  545 a m  Patient doing well overnight  Patient is mildly confused this a m  but is redirectable with time and place  Patient pain well controlled  Patient urine output decreased overnight and was given 250 cc bolus and IVF were increased to 75 cc/hour  Patient began to appropriately urinate  Otherwise patient has been hemodynamically stable  Patient has not had bowel movement since surgery  Patient states he has not been flatulating  Patient denies pain, lightheadedness, dizziness, chest pain, palpitation shortness of breath, abdominal pain, dysuria, pain extremities  ______________________________________________________________________    Physical Exam:   Physical Exam   Constitutional: No distress  HENT:   Head: Normocephalic and atraumatic  Mouth/Throat: No oropharyngeal exudate  Eyes: EOM are normal  Pupils are equal, round, and reactive to light  No scleral icterus  Neck: No JVD present  Cardiovascular: Normal rate and regular rhythm  Exam reveals no gallop and no friction rub  Murmur heard  Pulmonary/Chest: Effort normal and breath sounds normal  No stridor  No respiratory distress  He has no wheezes  He has no rales  He exhibits no tenderness  Decreased at the lung bases bilaterally   Abdominal: Soft  Bowel sounds are normal  He exhibits no distension and no mass  There is no tenderness  There is no rebound and no guarding  Musculoskeletal: He exhibits no edema, tenderness or deformity  Lymphadenopathy:     He has no cervical adenopathy  Neurological: He is alert  No cranial nerve deficit or sensory deficit  Oriented to self  Confused with time and place but is redirectable   Skin: Skin is warm and dry  Capillary refill takes less than 2 seconds  No rash noted  He is not diaphoretic  No erythema  No pallor  ______________________________________________________________________  Vitals:    17 0300 17 0400 17 0500 17 0517   BP:  154/65     Pulse: 74 84 89 101   Resp: 18 19 13 16   Temp:  98 9 °F (37 2 °C)     TempSrc:  Temporal     SpO2: 95% 95%     Weight:    62 6 kg (138 lb 0 1 oz)   Height:         Arterial Line BP: 174/57  Arterial Line MAP (mmHg): 99 mmHg     Temperature:   Temp (24hrs), Av 5 °F (36 9 °C), Min:97 2 °F (36 2 °C), Max:99 5 °F (37 5 °C)    Current Temperature: 98 9 °F (37 2 °C)    Weights:   IBW: 56 9 kg    Body mass index is 24 45 kg/m²  Weight (last 2 days)     Date/Time   Weight    17 05  62 6 (138 01)    17 2039  62 (136 69)    17 1731  60 8 (134 04)    17 1533  60 8 (134)              Hemodynamic Monitoring:  N/A       Non-Invasive/Invasive Ventilation Settings:  Respiratory    Lab Data (Last 4 hours)    None         O2/Vent Data (Last 4 hours)    None              No results found for: PHART, CQY7EXE, PO2ART, YRG9WFP, N4MVDDTE, BEART, SOURCE  SpO2: SpO2: 95 %    Intake and Outputs:  I/O        07 -  0700  07 -  0700    P  O   240    I V  (mL/kg)  150 (2 4)    IV Piggyback 500     Total Intake(mL/kg) 500 (8 2) 390 (6 3)    Urine (mL/kg/hr) 350 405 (0 3)    Emesis/NG output  0 (0)    Blood  200 (0 1)    Total Output 350 605    Net +150 -215              Urine output: 0 4cc/hr     Nutrition:        Diet Orders            Start     Ordered    17  Diet Cardiac; Sodium 2 GM  Diet effective now     Question Answer Comment   Diet Type Cardiac    Cardiac Sodium 2 GM    RD to adjust diet per protocol?  Yes        17 1847    17 1715  Room Service  Once     Question:  Type of Service  Answer:  Room Service - Appropriate with Assistance    17 1715            Labs:     Results from last 7 days  Lab Units 17  0517 17  2326 17  0709   WBC Thousand/uL 12 50* 11 50* 10 60   HEMOGLOBIN g/dL 7 3* 7 3* 9 3*   HEMATOCRIT % 21 7* 21 5* 27 2*   PLATELETS Thousands/uL 127* 109* 127*   NEUTROS PCT % 71 79* 80*   MONOS PCT % 9 8 6       Results from last 7 days  Lab Units 17  2326 17  0709 17  1539   SODIUM mmol/L 141 140 138   POTASSIUM mmol/L 4 3 4 6 4 5   CHLORIDE mmol/L 108 105 101   CO2 mmol/L 26 27 28   BUN mg/dL 33* 36* 33*   CREATININE mg/dL 0 90 1 22 1 33*   CALCIUM mg/dL 7 7* 8 1* 8 7   GLUCOSE RANDOM mg/dL 234* 211* 216*       Results from last 7 days  Lab Units 17  0709   MAGNESIUM mg/dL 2 0     Lab Results   Component Value Date    PHOS 3 6 2016        Results from last 7 days  Lab Units 17  1539   INR  1 02   PTT seconds 24       0  Lab Value Date/Time   TROPONINI 0 06 (H) 2017 1702   TROPONINI 0 05 (H) 2017 1350   TROPONINI 0 05 (H) 2017 0646   TROPONINI 0 06 2015 1055         ABG:No results found for: PHART, YCO8XXT, PO2ART, CJO2QPE, B2SONLTM, BEART, SOURCE    Imagin17  XR hip/pelv 2-3 vws right   IMPRESSION:     RIGHT intertrochanteric fracture and displacement of the lesser trochanter medially   This is seen on a background of coarse and heterogeneous reticular changes within the RIGHT hip as well as throughout the LEFT hemipelvis   These findings were present   previously and 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      17  CT head w/o contrast  IMPRESSION:     No acute intracranial normality or significant change from 17  CT spine cervical w/o contrast  IMPRESSION:     No acute fracture or traumatic listhesis      Moderate spondylosis     17  XR knee right  IMPRESSION:     No acute osseous abnormality  17  CXR   No active disease      Micro:  No results found for: Rubbie Mulling, WOUNDCULT, SPUTUMCULTUR    Allergies:    Allergies   Allergen Reactions    Amoxicillin-Pot Clavulanate     Clindamycin Other (See Comments)    Dilantin  [Phenytoin Sodium Extended] Other (See Comments)    Levaquin [Levofloxacin]     Penicillins      Medications:   Scheduled Meds:    amLODIPine 5 mg Oral Daily   aspirin 81 mg Oral Daily   carBAMazepine 300 mg Oral TID   docusate sodium 100 mg Oral BID   enoxaparin 30 mg Subcutaneous Daily   insulin lispro 1-5 Units Subcutaneous 4x Daily (AC & HS)   levothyroxine 75 mcg Oral Early Morning   lisinopril 5 mg Oral HS   metoprolol succinate 100 mg Oral Daily     Continuous Infusions:    sodium chloride 75 mL/hr Last Rate: 75 mL/hr (11/25/17 0011)     PRN Meds:    acetaminophen 650 mg Q6H PRN   HYDROmorphone 0 5 mg Q3H PRN   ondansetron 4 mg Q6H PRN   oxyCODONE 10 mg Q4H PRN   oxyCODONE 5 mg Q4H PRN   polyethylene glycol 17 g Daily PRN   senna 1 tablet HS PRN       Invasive lines and devices:   Invasive Devices     Peripheral Intravenous Line            Peripheral IV 11/23/17 Left Arm 1 day    Peripheral IV 11/24/17 Right Wrist less than 1 day          Arterial Line            Arterial Line 11/24/17 Left Radial less than 1 day          Drain            Urethral Catheter Latex 14 Fr  less than 1 day                   SIGNATURE: Solomon Monroy MD  DATE: November 25, 2017

## 2017-11-25 NOTE — PLAN OF CARE
Plan of care cont      DISCHARGE PLANNING     Discharge to home or other facility with appropriate resources Progressing        INFECTION - ADULT     Absence or prevention of progression during hospitalization Progressing        Knowledge Deficit     Patient/family/caregiver demonstrates understanding of disease process, treatment plan, medications, and discharge instructions Progressing        MUSCULOSKELETAL - ADULT     Maintain or return mobility to safest level of function Progressing        PAIN - ADULT     Verbalizes/displays adequate comfort level or baseline comfort level Progressing        Potential for Falls     Patient will remain free of falls Progressing        Prexisting or High Potential for Compromised Skin Integrity     Skin integrity is maintained or improved Progressing        SAFETY ADULT     Maintain or return mobility status to optimal level Progressing

## 2017-11-25 NOTE — CONSULTS
Consult - Critical Care   Cali Perez 80 y o  male MRN: [id number]  Unit/Bed#: ICU 05 Encounter: [id number]    Physician Requesting Consult: Ezio Corley MD    Reason for Consultation / Chief Complaint: POD # 0 s/p TFN overnight monitoring     History of Present Illness:  Cali Perez is a 80 y o  male, PMHx trigeminal neuralgia on carbamazepine, complete heart block s/p pacemaker placement, HTN, impaired glucose tolerance, hypothyroidism, colon cancer s/p colectomy, frequent fall, hyponatremia 2/2 to carbamazepine who presents with a fall at home  Patient presented the hospital and was found to have right intratrochanteric fracture and displacement of the lesser trochanter medially as evidence by x-ray hip/pelvis  Orthopedic surgery was consulted and patient elected to undergo surgical intervention  Cardiology was consulted for preoperative risk stratification  Patient underwent right hip TFN and is now POD # 0  Patient is comfortable resting in bed  Does not have any complaints and pain is well controlled  He is alert oriented x3  Operative course:   Anesthesia type:  Regional, spinal, epidural   Patient was slightly hypotensive requiring Harvey-Synephrine   mL, urine output 100 mL    History obtained from chart review and the patient  Past Medical History:  Past Medical History:   Diagnosis Date    Arthritis     Cancer ([facility] [address] )     colon    Cardiac disease     pacemaker    Diabetes mellitus ([facility] [address] )     Disease of thyroid gland     Hypertension     Trigeminal neuralgia        Past Surgical History:  Past Surgical History:   Procedure Laterality Date    APPENDECTOMY      CARDIAC PACEMAKER PLACEMENT  2012    CATARACT EXTRACTION      COLON SURGERY  2013    HERNIA REPAIR      SKIN GRAFT         Past Family History:  History reviewed  No pertinent family history      Social History:  History   Smoking Status    Never Smoker   Smokeless Tobacco    Never Used     History Alcohol Use No     History   Drug Use No     Marital Status: /Civil Union  Exercise History:  Ambulates    Home Medications:   Prior to Admission medications    Medication Sig Start Date End Date Taking? Authorizing Provider   amLODIPine (NORVASC) 5 mg tablet Take 5 mg by mouth    Historical Provider, MD   aspirin 81 mg chewable tablet Chew    Historical Provider, MD   CARBAMAZEPINE PO Take 300 mg by mouth 3 (three) times a day      Historical Provider, MD   levothyroxine 137 mcg tablet 0 075 mg      Historical Provider, MD   LISINOPRIL PO Take 5 mg by mouth daily at bedtime      Historical Provider, MD   metoprolol succinate (TOPROL-XL) 100 mg 24 hr tablet Take by mouth 2/2/17   Historical Provider, MD   polyethylene glycol (MIRALAX) 17 g packet Take 17 g by mouth daily as needed (Constipation) 9/2/17   Janene Pickett MD       Inpatient Medications:  Scheduled Meds:  aspirin 81 mg Oral Daily   carBAMazepine 300 mg Oral TID   docusate sodium 100 mg Oral BID   [START ON 11/25/2017] enoxaparin 30 mg Subcutaneous Daily   insulin lispro 1-5 Units Subcutaneous 4x Daily (AC & HS)   levothyroxine 75 mcg Oral Early Morning   [START ON 11/25/2017] vancomycin 1,000 mg Intravenous Q12H [address]     Continuous Infusions:  sodium chloride 50 mL/hr Last Rate: 50 mL/hr (11/23/17 2052)     PRN Meds:    acetaminophen 650 mg Q6H PRN   HYDROmorphone 0 2 mg Q4H PRN   HYDROmorphone 0 5 mg Q3H PRN   HYDROmorphone 0 5 mg Q3H PRN   ondansetron 4 mg Q6H PRN   oxyCODONE 10 mg Q4H PRN   oxyCODONE 5 mg Q4H PRN   polyethylene glycol 17 g Daily PRN   senna 1 tablet HS PRN       Allergies: Allergies   Allergen Reactions    Amoxicillin-Pot Clavulanate     Clindamycin Other (See Comments)    Dilantin  [Phenytoin Sodium Extended] Other (See Comments)    Levaquin [Levofloxacin]     Penicillins        ROS:   Review of Systems   Constitutional: Negative for chills, diaphoresis, fatigue and fever  HENT: Negative  Eyes: Negative  Respiratory: Negative for cough, chest tightness, shortness of breath, wheezing and stridor  Cardiovascular: Negative for chest pain, palpitations and leg swelling  Gastrointestinal: Negative for abdominal distention, abdominal pain, constipation, diarrhea, nausea and vomiting  Endocrine: Negative  Genitourinary: Negative  Musculoskeletal: Positive for arthralgias, gait problem and myalgias  Skin: Negative  Allergic/Immunologic: Negative  Neurological: Negative for dizziness and light-headedness  Hematological: Negative  Psychiatric/Behavioral: Negative  Vitals:  Vitals:    17   BP: 161/52  134/61    Pulse: 76 78  77   Resp: 18   18   Temp: (!) 97 2 °F (36 2 °C)      TempSrc:       SpO2: 100%   100%   Weight:       Height:    5' 3" (1 6 m)     Temperature:   Temp (24hrs), Av 2 °F (36 8 °C), Min:97 2 °F (36 2 °C), Max:99 °F (37 2 °C)    Current Temperature: (!) 97 2 °F (36 2 °C)    Weights:   IBW: 56 9 kg  Body mass index is 23 74 kg/m²  Hemodynamic Monitoring:  N/A     Non-Invasive/Invasive Ventilation Settings:  Respiratory    Lab Data (Last 4 hours)    None         O2/Vent Data (Last 4 hours)    None              No results found for: PHART, NTS9AJV, PO2ART, HMG2YEC, U0HNDDGG, BEART, SOURCE  SpO2: SpO2: 100 %     Physical Exam:  Physical Exam   Constitutional: He is oriented to person, place, and time  He appears well-developed and well-nourished  No distress  HENT:   Head: Normocephalic and atraumatic  Mouth/Throat: No oropharyngeal exudate  Eyes: EOM are normal  Pupils are equal, round, and reactive to light  No scleral icterus  Neck: Normal range of motion  No JVD present  Cardiovascular: Normal rate, regular rhythm and intact distal pulses  Exam reveals no gallop and no friction rub  Murmur heard  Pulmonary/Chest: Effort normal and breath sounds normal  No stridor  No respiratory distress   He has no wheezes  He has no rales  He exhibits no tenderness  Abdominal: Soft  Bowel sounds are normal  He exhibits no distension and no mass  There is no tenderness  There is no rebound and no guarding  Musculoskeletal: He exhibits no edema or deformity  Right hip bandaged clean, dry, intact  No erythema or discharge surrounding the bandage  Range of motion of right hip decreased 2/2 to surgical operation   Lymphadenopathy:     He has no cervical adenopathy  Neurological: He is alert and oriented to person, place, and time  No cranial nerve deficit or sensory deficit  Skin: Skin is warm and dry  Capillary refill takes less than 2 seconds  No rash noted  He is not diaphoretic  No erythema  No pallor  Labs:    Results from last 7 days  Lab Units 17  0709 17  1539   WBC Thousand/uL 10 60 7 90   HEMOGLOBIN g/dL 9 3* 11 7*   HEMATOCRIT % 27 2* 34 8*   PLATELETS Thousands/uL 127* 153   NEUTROS PCT % 80* 73   MONOS PCT % 6 5      Results from last 7 days  Lab Units 17  0709 17  1539   SODIUM mmol/L 140 138   POTASSIUM mmol/L 4 6 4 5   CHLORIDE mmol/L 105 101   CO2 mmol/L 27 28   BUN mg/dL 36* 33*   CREATININE mg/dL 1 22 1 33*   CALCIUM mg/dL 8 1* 8 7   GLUCOSE RANDOM mg/dL 211* 216*       Results from last 7 days  Lab Units 17  0709   MAGNESIUM mg/dL 2 0            Results from last 7 days  Lab Units 17  1539   INR  1 02   PTT seconds 24           0  Lab Value Date/Time   TROPONINI 0 06 (H) 2017 1702   TROPONINI 0 05 (H) 2017 1350   TROPONINI 0 05 (H) 2017 0646   TROPONINI 0 06 2015 1055       Imagin/23/17  XR hip/pelv 2-3 vws right   IMPRESSION:     RIGHT intertrochanteric fracture and displacement of the lesser trochanter medially  This is seen on a background of coarse and heterogeneous reticular changes within the RIGHT hip as well as throughout the LEFT hemipelvis    These findings were present   previously and 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  11/23/17  CT head w/o contrast  IMPRESSION:     No acute intracranial normality or significant change from 8/30/2017 11/23/17  CT spine cervical w/o contrast  IMPRESSION:     No acute fracture or traumatic listhesis      Moderate spondylosis    11/23/17  XR knee right  IMPRESSION:     No acute osseous abnormality  11/23/17  CXR   No active disease                   Micro:  Blood Culture: No results found for: BLOODCX  Urine Culture: No results found for: URINECX  Sputum Culture: No components found for: SPUTUMCX  Wound Culure: No results found for: WOUNDCULT      ______________________________________________________________________    Assessment and Plan:   Principal Problem:    Closed fracture of right hip ([facility] )  Active Problems:    Frequent falls    Leg pain    Cardiac disease    Hypertension    Disease of thyroid gland    Diabetes mellitus ([facility] )    Arthritis  Resolved Problems:    * No resolved hospital problems  *      Neuro:   · Alert and oriented x3  q4hr neuro checks  · Pain control:  Dilaudid 0 5 mg q 3 hours p r n  for breakthrough pain  Oxycodone 10mg q 4hr p r n severe pain, Oxycodone 5mg p r n for moderate pain  Zofran 4 mg q 6 hours p r n  for nausea vomiting  · Trigeminal neuralgia:  Continue carbamazepine 300 mg t i d     CV:   · HTN:  Norvasc 5 mg daily  Will hold lisinopril for now due to SHA  · H/o sick sinus syndrome:  S/p pacemaker:  Continuous cardiopulmonary monitoring    Pulm:   · Acute issues  · Incentive spirometry    GI:   · No acute issues  · Diet cardiac with sodium 2 g restriction    FEN:   · Replete electrolytes as necessary  goal:  K > 4, Phos > 3, Mg > 2  · IVF:  NS 50 cc/hour continuous    :   · SHA:  Creatinine 1 2 to this jolene Bowens Trending down to near baseline  Will follow up postop labs     · Shyann present: yes, monitoring in ICU   · Continue to monitor I/O    ID:   · No signs or symptoms of infection will continue to monitor clinical disposition, temperature curve, and white blood cell count    Heme:   · H/H:  Will follow up postop labs  · Continue to trend hemoglobin transfuse if needed to > 7    Endo:   · Glucose intolerance:  Hemoglobin A1c 6 1 on 11/24/17  Continue with ISS algorithm one with glucose checks q a c  Q h s   · Hypothyroidism:  Continue levothyroxine 35 mcg  · 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    Msk/Skin:   · S/p TFN POD # 0:  Pain management as above  Will follow up CBC and BMP postop labs, monitor dressing for discharge on admission clean dry and intact  Postop vancomycin 1 g q 12 hours for 2 doses  Follow-up Orthopedic surgery recommendations  · Fall precautions as patient is fall risk  · PT/OT WBAT    Family notified: yes     Disposition: Admit to Stepdown     Counseling / Coordination of Care  Total time spent today 40 minutes  Greater than 50% of total time was spent with the patient and / or family counseling and / or coordination of care  A description of the counseling / coordination of care: See above assessment/plan and attending attestation  Patient has diagnosis requiring step-down evaluation management   ______________________________________________________________________    VTE Pharmacologic Prophylaxis: Enoxaparin (Lovenox)  VTE Mechanical Prophylaxis: sequential compression device    Invasive lines and devices: Invasive Devices     Peripheral Intravenous Line            Peripheral IV 11/23/17 Left Arm 1 day    Peripheral IV 11/24/17 Right Wrist less than 1 day          Arterial Line            Arterial Line 11/24/17 Left Radial less than 1 day          Drain            Urethral Catheter Latex 14 Fr  less than 1 day                Code Status: Level 1 - Full Code  POA:    POLST:      Given critical illness, patient length of stay will require greater than two midnights      Signature: Iris Magana MD  Date: 11/24/2017      Consults

## 2017-11-25 NOTE — PROGRESS NOTES
Patient taken off the floor via a stretcher with all his belongings  Patient condition stable at this time

## 2017-11-25 NOTE — PROGRESS NOTES
Progress Note - Orthopedics   Mike Moritz 80 y o  male MRN: [id number]  Unit/Bed#: ICU 05 Encounter: [id number]    Assessment:  1) POD#1 s/p R TFN  2) Paget's disease    Plan:  Transfer to ICU/Step down post op for overnight monitoring- ok with transfer to floor when medically stable  Vancomycin 1g IV x 2 for 24 hours postop- completed  DVT prophylaxis: Lovenox 30mg Sq Qdaily/SCD's/Ambulation  WBAT  PT/OT- WBAT  Analgesia PRN  Spinal resolved- motor/sense function intact  Neuro checks Q 4  Follow up AM labs: hgb 7 3 this AM   Pt with mild tachycardia  Would recommend 1 UPRBC, d/w ICU team and team agrees    Continue guerrero for monitoring in ICU  Continue medical management per ICU/SLIM  Dressing- monitor for drainage, may leave dressing in place x 7 days  Discharge planning - pending progress with PT    Weight bearing:  Weight-bearing as tolerated    VTE Pharmacologic Prophylaxis: Enoxaparin (Lovenox) start today  VTE Mechanical Prophylaxis: sequential compression device    Subjective: Pt seen and examined  Sleeping in chair at bedside  Pain controlled  Vitals: Blood pressure 143/62, pulse (!) 107, temperature 98 9 °F (37 2 °C), temperature source Tympanic, resp  rate 21, height 5' 3" (1 6 m), weight 62 6 kg (138 lb 0 1 oz), SpO2 95 %  ,Body mass index is 24 45 kg/m²        Intake/Output Summary (Last 24 hours) at 11/25/17 1041  Last data filed at 11/25/17 0800   Gross per 24 hour   Intake          2763 33 ml   Output              890 ml   Net          1873 33 ml       Invasive Devices     Peripheral Intravenous Line            Peripheral IV 11/23/17 Left Arm 1 day    Peripheral IV 11/24/17 Right Wrist less than 1 day          Arterial Line            Arterial Line 11/24/17 Left Radial less than 1 day                Physical Exam: NAD  Ortho Exam: Dsg c/d/i, thigh soft,  +DF/PF/EHL,  +L2-S1 SILT, DP2+, foot warm      Lab, Imaging and other studies:  PO XR R hip: reviewed, acceptable    Mine Lee D O   Division of Adult Reconstruction  Department of [facility]

## 2017-11-25 NOTE — OCCUPATIONAL THERAPY NOTE
OT EVALUATION     11/25/17 1115   Restrictions/Precautions   Weight Bearing Precautions Per Order Yes   RLE Weight Bearing Per Order WBAT   Other Precautions Hard of hearing;Pain; Fall Risk;Telemetry;Multiple lines; Bed Alarm; Chair Alarm;Cognitive   Pain Assessment   Pain Assessment No/denies pain   Pain Score No Pain  (at rest, pain with movement of R HIP )   Pain Type Acute pain;Surgical pain   Pain Location Hip   Pain Orientation Right   Home Living   Type of Home SNF  ([address] [place] [address])   Additional Comments pt poor historian unable to give history/details   Prior Function   Level of [place] Needs assistance with ADLs and functional mobility   Lives With Facility staff   Receives Help From (facility staff)   ADL Assistance Needs assistance   IADLs Needs assistance   Falls in the last 6 months 1 to 4   Comments pt poor historian   Subjective   Subjective Now that I'm up I want to sit up  ADL   Where Assessed Chair   Eating Assistance 5  Supervision/Setup   Grooming Assistance 4  Minimal Assistance   LB Dressing Assistance 1  Total Assistance   Bed Mobility   Rolling R Unable to assess  (pt OOB in B/S chair)   Supine to Sit Unable to assess  (pt OOB in B/S chair)   Transfers   Sit to Stand 2  Maximal assistance   Additional items Assist x 2   Stand to Sit 2  Maximal assistance   Additional items Assist x 2   Additional Comments pt leaning backwards in stance   Functional Mobility   Functional Mobility (Not tested, continue to assess)   Balance   Static Sitting Poor   Dynamic Sitting Poor   Activity Tolerance   Activity Tolerance Patient limited by fatigue;Patient limited by pain; Other (Comment)  (cognition)   RUE Assessment   RUE Assessment (ROM: WFL MMT: 3+/5)   LUE Assessment   LUE Assessment (ROM: WFL MMT: 3+/5)   Cognition   Overall Cognitive Status Impaired   Arousal/Participation Cooperative  (sleepy)   Attention Difficulty dividing attention   Orientation Level Oriented to person   Following Commands Follows one step commands with increased time or repetition   Assessment   Limitation Decreased ADL status; Decreased UE ROM; Decreased UE strength;Decreased Safe judgement during ADL;Decreased cognition;Decreased endurance;Decreased self-care trans;Decreased high-level ADLs   Prognosis Good   Assessment Patient evaluated by Occupational Therapy  Patient admitted with Closed fracture of right hip ([facility] [address] )  The patients occupational profile, medical and therapy history includes a extensive additional review of physical, cognitive, or psychosocial history related to current functional performance  Comorbidities affecting functional mobility and ADLS include: anemia, arthritis, diabetes, falls, hypertension and PVD, hypothyroidism, cancer, chronic pain, venous insufficiency  Prior to admission, patient was requiring assist for functional mobility with walker, requiring assist for ADLS, requiring assist for IADLS, a resident of long term care and having 24 hour care   The evaluation identifies the following performance deficits: weakness, decreased ROM, impaired balance, decreased endurance, decreased coordination, increased fall risk, new onset of impairment of functional mobility, decreased ADLS, decreased IADLS, pain, decreased activity tolerance, decreased safety awareness, decreased cognition and decreased strength, that result in activity limitations and/or participation restrictions  This evaluation requires clinical decision making of high complexity, because the patient presents with comorbidites that affect occupational performance and required significant modification of tasks or assistance with consideration of multiple treatment options  The Barthel Index was used as a functional outcome tool presenting with a score of 30, indicating marked limitations of functional mobility and ADLS    Patient will benefit from skilled Occupational Therapy services to address above deficits and facilitate a safe return to prior level of function  Goals   Patient Goals none stated   STG Time Frame (1-7 days)   Short Term Goal #1 Patient will increase standing tolerance to 3 minutes during functional activity; Patient will increase bed mobility to mod assist; Patient will increase functional mobility to bathroom/bedside commode with rolling walker with max assist of 2 to increase performance with ADLS; Patient will tolerate 5 minutes of UE ROM/strengthening to increase general activity tolerance and performance in ADLS/IADLS; Patient will improve functional activity tolerance to 8 minutes of sustained functional tasks to increase participation in basic self-care and decrease assistance level  LTG Time Frame (8-14 days)   Long Term Goal #1 Patient will increase standing tolerance to 5 minutes during functional activity; Patient will increase bed mobility to min assist; Patient will increase functional mobility to  bathroom/bedside commode  with rolling walker with mod assist of 2 to increase performance with ADLS; Patient will tolerate 8 minutes of UE ROM/strengthening to increase general activity tolerance and performance in ADLS/IADLS; Patient will improve functional activity tolerance to 12 minutes of sustained functional tasks to increase participation in basic self-care and decrease assistance level  Functional Transfer Goals   Pt Will Perform All Functional Transfers (STG: MOD assist x 2 LTG: MOD assist x 1)   ADL Goals   Pt Will Perform Grooming (STG: MIN/Supervision LTG: supervision)   Pt Will Perform LE Dressing (STG: MAX assist LTG: MOD assist)   Plan   Treatment Interventions ADL retraining;Functional transfer training;UE strengthening/ROM; Endurance training;Cognitive reorientation;Patient/family training;Equipment evaluation/education; Activityengagement; Energy conservation   OT Frequency 3-5x/wk   Recommendation   OT Discharge Recommendation (STR at SNF)   Equipment Recommended Bedside commode;Tub seat with back  (continue to assess)   Barthel Index   Feeding 5   Bathing 0   Grooming Score 0   Dressing Score 0   Bladder Score 5   Bowels Score 10   Toilet Use Score 5   Transfers (Bed/Chair) Score 5   Mobility (Level Surface) Score 0   Stairs Score 0   Barthel Index Score 30

## 2017-11-25 NOTE — PHYSICAL THERAPY NOTE
PT TREATMENT     11/25/17 0923   Pain Assessment   Pain Assessment No/denies pain  (at rest, Faces 8/10 with exercises, RN aware)   Restrictions/Precautions   RLE Weight Bearing Per Order WBAT   Other Precautions Bed Alarm; Chair Alarm;Hard of hearing;Visual impairment;Pain; Fall Risk;Telemetry;Multiple lines  (a line L wrist)   General   Chart Reviewed Yes   Cognition   Overall Cognitive Status Impaired   Arousal/Participation Cooperative   Subjective   Subjective "I want to get moving"   Transfers   Additional Comments scooting back in chair max assist x 2   Exercises   Heelslides 10 reps; Supine;Bilateral   Hip Abduction 10 reps; Supine;Bilateral   Knee AROM Short Arc Quad Bilateral;Supine;10 reps   Ankle Pumps AROM;20 reps; Supine   Assessment   Prognosis Good   Problem List Decreased strength;Decreased endurance;Decreased range of motion; Impaired balance;Decreased mobility;Pain;Orthopedic restrictions   Assessment Pt could not say if he wanted pain meds, pt has no pain at rest but pain seems to limit exercise participation  Plan to ask RN to premedicate pt before next treatment  Pt left OOB in chair, ice on hip after session  Plan   Treatment/Interventions LE strengthening/ROM; Functional transfer training;ADL retraining; Therapeutic exercise; Endurance training;Spoke to nursing;Gait training;Bed mobility; Equipment eval/education;Patient/family training   Progress Progressing toward goals   Recommendation   Recommendation (STR)

## 2017-11-25 NOTE — PHYSICAL THERAPY NOTE
PT EVALUATION       11/25/17 0910   Pain Assessment   Pain Assessment No/denies pain   Pain Score (at rest)   Home Living   Type of Home SNF  ([facility])   Prior Function   Level of [place] Needs assistance with ADLs and functional mobility   Lives With Facility staff   Restrictions/Precautions   Weight Bearing Precautions Per Order Yes   RLE Weight Bearing Per Order WBAT   Other Precautions Pain; Fall Risk;Telemetry;Multiple lines; Bed Alarm; Chair Alarm;Cognitive  (a line L wrist)   General   Additional Pertinent History Pt was home visiting family and sustained a fall  Cognition   Overall Cognitive Status Impaired   Arousal/Participation Cooperative   Orientation Level Oriented to person   Following Commands Follows one step commands with increased time or repetition   RLE Assessment   RLE Assessment (hip ROM anguiano by pain, MMT hip 2-/5, knee 3-/5, ankle 4-/)   LLE Assessment   LLE Assessment (ROM WFL, MMT 3+/5)   Bed Mobility   Supine to Sit 2  Maximal assistance   Transfers   Sit to Stand 2  Maximal assistance   Additional items Assist x 2   Stand to Sit 2  Maximal assistance   Additional items Assist x 2   Stand pivot 2  Maximal assistance   Additional items Assist x 2   Ambulation/Elevation   Gait pattern (NA- jennifer in L wrist so NWB, could not use walker)   Balance   Static Sitting Poor   Dynamic Sitting Poor   Static Standing Poor   Dynamic Standing Poor   Activity Tolerance   Activity Tolerance Patient limited by pain   Assessment   Prognosis Good   Problem List Decreased strength;Decreased range of motion;Decreased endurance; Impaired balance;Decreased mobility;Pain;Orthopedic restrictions   Assessment Patient seen for Physical Therapy evaluation  Patient admitted with Closed fracture of right hip ([address] ) now s/p IM nail2  Comorbidities affecting patient's physical performance include: DM, arthritis, chronic pain, colon ca, PPM, PVD    Personal factors affecting patient at time of initial evaluation include: ambulating with assistive device, inability to ambulate household distances, positive fall history, hearing impairments and visual impairments  Prior to admission, patient was requiring assist for functional mobility with walker  Please find objective findings from Physical Therapy assessment regarding body systems outlined above with impairments and limitations including weakness, decreased ROM, impaired balance, decreased endurance, gait deviations, pain, decreased activity tolerance, decreased functional mobility tolerance, fall risk and orthopedic restrictions  The Barthel Index was used as a functional outcome tool presenting with a score of 30 today indicating marked limitations of functional mobility and ADLS  Patient's clinical presentation is currently unstable/unpredictable as seen in patient's presentation of vital sign response, changing level of pain, increased fall risk, new onset of impairment of functional mobility and new onset of weakness  Pt would benefit from continued Physical Therapy treatment to address deficits as defined above and maximize level of functional mobility  As demonstrated by objective findings, the assigned level of complexity for this evaluation is high  Goals   Patient Goals none stated   STG Expiration Date (1-7 days)   Short Term Goal #1 mod assist bed mobility, improve transfers to max assist with walker, assess gait, improve sitting static balance to fair   LTG Expiration Date (1-2 weeks)   Long Term Goal #1 min assist bed mobility, min assist transfers with walker, mod assist ambulation with walker 20 feet, improve R LE strength to at least 3+/5, improve standing static balance to at least fair   Plan   Treatment/Interventions ADL retraining;Functional transfer training;LE strengthening/ROM; Therapeutic exercise; Endurance training;Patient/family training;Bed mobility; Equipment eval/education;Gait training;Spoke to nursing   PT Frequency (daily)   Recommendation Recommendation (STR)   Barthel Index   Feeding 5   Bathing 0   Grooming Score 0   Dressing Score 0   Bladder Score 5   Bowels Score 10   Toilet Use Score 5   Transfers (Bed/Chair) Score 5   Mobility (Level Surface) Score 0   Stairs Score 0   Barthel Index Score 30

## 2017-11-26 LAB
ABO GROUP BLD BPU: NORMAL
ANION GAP SERPL CALCULATED.3IONS-SCNC: 7 MMOL/L (ref 4–13)
BASOPHILS # BLD AUTO: 0 THOUSANDS/ΜL (ref 0–0.1)
BASOPHILS NFR BLD AUTO: 0 % (ref 0–1)
BPU ID: NORMAL
BUN SERPL-MCNC: 31 MG/DL (ref 5–25)
CALCIUM SERPL-MCNC: 8.4 MG/DL (ref 8.3–10.1)
CHLORIDE SERPL-SCNC: 107 MMOL/L (ref 100–108)
CO2 SERPL-SCNC: 26 MMOL/L (ref 21–32)
CREAT SERPL-MCNC: 1.07 MG/DL (ref 0.6–1.3)
CROSSMATCH: NORMAL
EOSINOPHIL # BLD AUTO: 0.3 THOUSAND/ΜL (ref 0–0.61)
EOSINOPHIL NFR BLD AUTO: 4 % (ref 0–6)
ERYTHROCYTE [DISTWIDTH] IN BLOOD BY AUTOMATED COUNT: 13.7 % (ref 11.6–15.1)
GFR SERPL CREATININE-BSD FRML MDRD: 57 ML/MIN/1.73SQ M
GLUCOSE SERPL-MCNC: 198 MG/DL (ref 65–140)
GLUCOSE SERPL-MCNC: 220 MG/DL (ref 65–140)
GLUCOSE SERPL-MCNC: 258 MG/DL (ref 65–140)
GLUCOSE SERPL-MCNC: 261 MG/DL (ref 65–140)
GLUCOSE SERPL-MCNC: 369 MG/DL (ref 65–140)
HCT VFR BLD AUTO: 23.3 % (ref 42–52)
HCT VFR BLD AUTO: 26.2 % (ref 42–52)
HGB BLD-MCNC: 7.9 G/DL (ref 14–18)
HGB BLD-MCNC: 8.8 G/DL (ref 14–18)
LYMPHOCYTES # BLD AUTO: 1.4 THOUSANDS/ΜL (ref 0.6–4.47)
LYMPHOCYTES NFR BLD AUTO: 16 % (ref 14–44)
MAGNESIUM SERPL-MCNC: 2.3 MG/DL (ref 1.6–2.6)
MCH RBC QN AUTO: 30.2 PG (ref 27–31)
MCHC RBC AUTO-ENTMCNC: 33.9 G/DL (ref 31.4–37.4)
MCV RBC AUTO: 89 FL (ref 82–98)
MONOCYTES # BLD AUTO: 0.6 THOUSAND/ΜL (ref 0.17–1.22)
MONOCYTES NFR BLD AUTO: 7 % (ref 4–12)
NEUTROPHILS # BLD AUTO: 6.7 THOUSANDS/ΜL (ref 1.85–7.62)
NEUTS SEG NFR BLD AUTO: 73 % (ref 43–75)
PHOSPHATE SERPL-MCNC: 2.5 MG/DL (ref 2.3–4.1)
PLATELET # BLD AUTO: 102 THOUSANDS/UL (ref 130–400)
PMV BLD AUTO: 9 FL (ref 8.9–12.7)
POTASSIUM SERPL-SCNC: 4 MMOL/L (ref 3.5–5.3)
RBC # BLD AUTO: 2.62 MILLION/UL (ref 4.7–6.1)
SODIUM SERPL-SCNC: 140 MMOL/L (ref 136–145)
UNIT DISPENSE STATUS: NORMAL
UNIT PRODUCT CODE: NORMAL
UNIT RH: NORMAL
WBC # BLD AUTO: 9 THOUSAND/UL (ref 4.8–10.8)

## 2017-11-26 PROCEDURE — 85014 HEMATOCRIT: CPT | Performed by: INTERNAL MEDICINE

## 2017-11-26 PROCEDURE — 85025 COMPLETE CBC W/AUTO DIFF WBC: CPT | Performed by: NURSE PRACTITIONER

## 2017-11-26 PROCEDURE — 80048 BASIC METABOLIC PNL TOTAL CA: CPT | Performed by: NURSE PRACTITIONER

## 2017-11-26 PROCEDURE — 97530 THERAPEUTIC ACTIVITIES: CPT

## 2017-11-26 PROCEDURE — 97110 THERAPEUTIC EXERCISES: CPT

## 2017-11-26 PROCEDURE — 84100 ASSAY OF PHOSPHORUS: CPT | Performed by: NURSE PRACTITIONER

## 2017-11-26 PROCEDURE — 82948 REAGENT STRIP/BLOOD GLUCOSE: CPT

## 2017-11-26 PROCEDURE — 85018 HEMOGLOBIN: CPT | Performed by: INTERNAL MEDICINE

## 2017-11-26 PROCEDURE — 83735 ASSAY OF MAGNESIUM: CPT | Performed by: NURSE PRACTITIONER

## 2017-11-26 RX ADMIN — DOCUSATE SODIUM 100 MG: 100 CAPSULE, LIQUID FILLED ORAL at 17:02

## 2017-11-26 RX ADMIN — AMLODIPINE BESYLATE 5 MG: 5 TABLET ORAL at 08:32

## 2017-11-26 RX ADMIN — INSULIN LISPRO 2 UNITS: 100 INJECTION, SOLUTION INTRAVENOUS; SUBCUTANEOUS at 16:59

## 2017-11-26 RX ADMIN — ACETAMINOPHEN 650 MG: 325 TABLET, FILM COATED ORAL at 17:02

## 2017-11-26 RX ADMIN — CARBAMAZEPINE 300 MG: 200 TABLET ORAL at 15:44

## 2017-11-26 RX ADMIN — METOPROLOL SUCCINATE 100 MG: 100 TABLET, FILM COATED, EXTENDED RELEASE ORAL at 08:31

## 2017-11-26 RX ADMIN — ENOXAPARIN SODIUM 30 MG: 30 INJECTION SUBCUTANEOUS at 08:32

## 2017-11-26 RX ADMIN — ACETAMINOPHEN 650 MG: 325 TABLET, FILM COATED ORAL at 00:14

## 2017-11-26 RX ADMIN — LISINOPRIL 5 MG: 5 TABLET ORAL at 21:58

## 2017-11-26 RX ADMIN — LEVOTHYROXINE SODIUM 75 MCG: 75 TABLET ORAL at 05:52

## 2017-11-26 RX ADMIN — CARBAMAZEPINE 300 MG: 200 TABLET ORAL at 08:31

## 2017-11-26 RX ADMIN — CARBAMAZEPINE 300 MG: 200 TABLET ORAL at 21:57

## 2017-11-26 RX ADMIN — INSULIN LISPRO 2 UNITS: 100 INJECTION, SOLUTION INTRAVENOUS; SUBCUTANEOUS at 21:58

## 2017-11-26 RX ADMIN — INSULIN LISPRO 2 UNITS: 100 INJECTION, SOLUTION INTRAVENOUS; SUBCUTANEOUS at 08:32

## 2017-11-26 RX ADMIN — Medication 1 TABLET: at 08:32

## 2017-11-26 RX ADMIN — OXYCODONE HYDROCHLORIDE AND ACETAMINOPHEN 500 MG: 500 TABLET ORAL at 08:32

## 2017-11-26 RX ADMIN — ACETAMINOPHEN 650 MG: 325 TABLET, FILM COATED ORAL at 05:52

## 2017-11-26 RX ADMIN — ACETAMINOPHEN 650 MG: 325 TABLET, FILM COATED ORAL at 23:37

## 2017-11-26 RX ADMIN — OXYCODONE HYDROCHLORIDE AND ACETAMINOPHEN 500 MG: 500 TABLET ORAL at 17:02

## 2017-11-26 RX ADMIN — ASPIRIN 81 MG 81 MG: 81 TABLET ORAL at 08:32

## 2017-11-26 RX ADMIN — DOCUSATE SODIUM 100 MG: 100 CAPSULE, LIQUID FILLED ORAL at 08:31

## 2017-11-26 RX ADMIN — ACETAMINOPHEN 650 MG: 325 TABLET, FILM COATED ORAL at 13:27

## 2017-11-26 NOTE — PROGRESS NOTES
Assisted with patient transfer from ICU to   Patient stable, VS recorded   Transferred care to Marcy Alonso RN

## 2017-11-26 NOTE — PROGRESS NOTES
Progress Note - Orthopedics   Ova Schneiders 80 y o  male MRN: [id number]  Unit/Bed#: [address] [place] [address] Encounter: [id number]    Assessment:  1) POD#2 s/p R TFN  2) Paget's disease    Plan:  Pt transferred to [address] [place] [address]  Vancomycin 1g IV x 2 for 24 hours postop- completed  DVT prophylaxis: Lovenox 30mg Sq Qdaily/SCD's/Ambulation  WBAT  PT/OT- WBAT  Analgesia PRN  Spinal resolved- motor/sense function intact  Neuro checks Q 4  Follow up AM labs: hgb 7 9 this AM  s/p 1 UPRBC 11/25/17, pt asymptomatic since transfusion  Pt with pagetoid bone, increased bleeding can be expected  Thigh benign on exam today  Continue to monitor hgb, would have low threshold for repeat transfusion  Continue medical management per SLIM  Dressing- monitor for drainage, may leave dressing in place x 7 days  Discharge planning - pending progress with PT, likely d/c back to [facility]    Weight bearing:  Weight-bearing as tolerated    VTE Pharmacologic Prophylaxis: Enoxaparin (Lovenox)   VTE Mechanical Prophylaxis: sequential compression device    Subjective: Pt seen and examined  Sitting in chair at bedside  Pain in right hip controlled  No paresthesias right lower extremity  Vitals: Blood pressure 133/74, pulse 88, temperature 98 1 °F (36 7 °C), temperature source Tympanic, resp  rate 18, height 5' 3" (1 6 m), weight 62 6 kg (138 lb 0 1 oz), SpO2 96 %  ,Body mass index is 24 45 kg/m²        Intake/Output Summary (Last 24 hours) at 11/26/17 0955  Last data filed at 11/26/17 0554   Gross per 24 hour   Intake             1040 ml   Output              700 ml   Net              340 ml       Invasive Devices     Peripheral Intravenous Line            Peripheral IV 11/24/17 Right Wrist 1 day                Physical Exam: NAD  Ortho Exam: Dsg c/d/i, thigh soft,  proximal thigh edema however appropriate for postoperative course compartments soft, knee extension without pain,  + DF/PF/EHL,  +L2-S1 SILT, DP2+, foot warm      Antoinette Regina D O   Division of Adult Reconstruction  Department of [facility]

## 2017-11-26 NOTE — PHYSICAL THERAPY NOTE
PT TREATMENT     11/26/17 0808   Pain Assessment   Pain Assessment Fam-Baker FACES   Fam-Baker FACES Pain Rating 6  (with movement of RLE and wgt bearing)   Pain Type Surgical pain;Acute pain   Pain Location Hip   Pain Orientation Right   Restrictions/Precautions   Other Precautions Fall Risk;Pain   General   Chart Reviewed Yes   Family/Caregiver Present No   Cognition   Overall Cognitive Status WFL   Arousal/Participation Cooperative   Attention Within functional limits   Orientation Level Oriented X4   Following Commands Follows all commands and directions without difficulty   Subjective   Subjective "I hope I don't loose my spot at [facility],  my friend will miss me"   Bed Mobility   Supine to Sit 2  Maximal assistance   Additional items Assist x 2;Verbal cues;LE management   Transfers   Sit to Stand 2  Maximal assistance   Additional items Assist x 2;Verbal cues   Stand to Sit 2  Maximal assistance   Additional items Assist x 2;Verbal cues   Ambulation/Elevation   Gait pattern Antalgic; Forward Flexion   Gait Assistance 2  Maximal assist   Additional items Assist x 2;Verbal cues   Assistive Device (bilat  UE support of 2 people)   Distance 3 feet bed > chair   Activity Tolerance   Activity Tolerance Patient tolerated treatment well;Patient limited by pain; Patient limited by fatigue   Exercises   Hip Flexion Sitting;20 reps; Left;10 reps;Right;AAROM   Knee AROM Long Arc Quad Sitting;Left;Right;AAROM   Ankle Pumps Sitting;20 reps;Bilateral   Assessment   Prognosis Good   Problem List Decreased strength;Decreased range of motion;Decreased endurance; Impaired balance;Decreased mobility; Decreased coordination;Pain   Assessment Pt  is motivated, but has pain in right hip with mobility  Pt is concerned about loosing his spot at [facility]  Will benefit from continued PT while in hospital to progress as tolerated     Goals   Patient Goals To go back to [facility]   Treatment/Interventions Functional transfer training;LE strengthening/ROM; Therapeutic exercise; Endurance training;Patient/family training;Equipment eval/education; Bed mobility;Gait training   Progress Progressing toward goals   Recommendation   Recommendation (STR vs return to [facility] with PT pending progress)   In chair with all needs in reach at end of session  Pt also completed 10 reps with incentive spirometer with instruction on use  RN aware

## 2017-11-26 NOTE — PROGRESS NOTES
[facility] Internal Medicine Progress Note  Patient: Nery Sotomayor 80 y o  male   MRN: [id number]  PCP: Freeman Byrne DO  Unit/Bed#: 2 [place] [address] Encounter: [id number]  Date Of Visit: 11/26/17    Problem List:    Principal Problem:    Closed fracture of right hip ([facility] [address] )  Active Problems:    Frequent falls    Primary hypothyroidism    Diabetes mellitus ([facility] )    Arthritis    Aortic valve stenosis    Benign essential hypertension    Pacemaker    Acute blood loss anemia      Assessment & Plan:    1  Right hip intertrochanteric fracture status post TFN (POD#2) - continue PT/OT, DVT prophylaxis and current pain control  P follow-up labs and monitor postoperative course   2  Status post fall, likely mechanical; history of recurrent fall-PT/OT  3  Normocytic anemia, acute on chronic secondary to blood loss related to 1 status post 1 PRBC-monitor H&H  Will transfuse if less than hemoglobin 7 5  4  Uncontrolled hypertension-acceptable at present  Continue Norvasc, metoprolol and lisinopril  Monitor  5  Status post Acute kidney injury on CKD stage 3-  Monitor  6  History of impaired glucose tolerance with hemoglobin A1c of 6 1-monitor on corrective scale  7  Hypothyroidism-on Synthroid  TSH noted  T4 within normal limit  8  History of trigeminal neurology, intractable-currently controlled on carbamazepine  9  History of sick sinus syndrome k status post pacemaker placement  10  History of SIADH-sodium level is stable continue to monitor, avoid hypotonic solution  Fluid restriction  11  History of colon cancer status post colectomy in remission      VTE Pharmacologic Prophylaxis:   Pharmacologic: Enoxaparin (Lovenox)  Mechanical VTE Prophylaxis in Place: Yes    Patient Centered Rounds: I have performed bedside rounds with nursing staff today  Discussions with Specialists or Other Care Team Provider: Yes    Education and Discussions with Family / Patient:Yes    Time Spent for Care: 45 minutes    More than 50% of total time spent on counseling and coordination of care as described above  Current Length of Stay: 3 day(s)    Current Patient Status: Inpatient     Discharge Plan:  Anticipate rehab in 1-2 days    Code Status: Level 1 - Full Code      Subjective:   Noted to be sitting up in the chair  Denies any pain  Reports some difficulty in urination  Denies chest pain or shortness of breath      Objective:   Not in distress  Constipation    Negative for Chest Pain, Palpitations, Shortness of Breath, Abdominal Pain, Nausea, Vomiting, Diarrhea, Dizziness  All other 10 review of systems negative and without drastic interval changes from yesterday  Vitals:   Temp (24hrs), Av 2 °F (37 3 °C), Min:98 1 °F (36 7 °C), Max:100 4 °F (38 °C)    HR:  [] 88  Resp:  [14-25] 18  BP: (111-161)/(56-77) 133/74  SpO2:  [91 %-99 %] 96 %  Body mass index is 24 45 kg/m²  Input and Output Summary (last 24 hours): Intake/Output Summary (Last 24 hours) at 17 1037  Last data filed at 17 0554   Gross per 24 hour   Intake             1040 ml   Output              700 ml   Net              340 ml       Physical Exam:     Physical Exam   Constitutional: He is oriented to person, place, and time  He appears well-developed  No distress  HENT:   Head: Normocephalic and atraumatic  Nose: Nose normal    Mouth/Throat: Oropharynx is clear and moist    Eyes: Conjunctivae and EOM are normal  Pupils are equal, round, and reactive to light  Neck: Normal range of motion  Neck supple  No JVD present  Cardiovascular: Normal rate and regular rhythm  Exam reveals no gallop and no friction rub  Murmur heard  Pulmonary/Chest: Effort normal  No respiratory distress  He has decreased breath sounds  He has no wheezes  He has no rhonchi  He has no rales  He exhibits no tenderness  Pacemaker in place   Abdominal: Soft  Bowel sounds are normal  He exhibits no distension  There is no tenderness   There is no rebound and no guarding  Musculoskeletal: He exhibits no edema  Right hip dressing in place   Neurological: He is alert and oriented to person, place, and time  No cranial nerve deficit  At his baseline   Skin: Skin is warm and dry  No rash noted  Psychiatric: He has a normal mood and affect  Forgetful    Additional Data:     Labs:      Results from last 7 days  Lab Units 11/26/17  0704   WBC Thousand/uL 9 00   HEMOGLOBIN g/dL 7 9*   HEMATOCRIT % 23 3*   PLATELETS Thousands/uL 102*   NEUTROS PCT % 73   LYMPHS PCT % 16   MONOS PCT % 7   EOS PCT % 4       Results from last 7 days  Lab Units 11/26/17  0704 11/25/17  0517   SODIUM mmol/L 140 141   POTASSIUM mmol/L 4 0 4 2   CHLORIDE mmol/L 107 108   CO2 mmol/L 26 25   BUN mg/dL 31* 30*   CREATININE mg/dL 1 07 0 89   CALCIUM mg/dL 8 4 8 0*   TOTAL PROTEIN g/dL  --  5 6*   BILIRUBIN TOTAL mg/dL  --  0 40   ALK PHOS U/L  --  93   ALT U/L  --  17   AST U/L  --  15   GLUCOSE RANDOM mg/dL 198* 212*       Results from last 7 days  Lab Units 11/23/17  1539   INR  1 02     Telemetry reviewed with Cardiology  * I Have Reviewed All Lab Data Listed Above  * Additional Pertinent Lab Tests Reviewed: [facility]    Imaging:  Xr Hip/pelv 2-3 Vws Right    Result Date: 11/23/2017  Narrative: RIGHT HIP INDICATION:  Fall, RIGHT hip pain COMPARISON: Mayra 3, 2013 VIEWS: AP pelvis and 2 coned down views of the hip IMAGES:  3 FINDINGS: There is a nondisplaced RIGHT intertrochanteric fracture without dislocation or significant angulation  Mild displacement of the comminuted fracture fragment through the lesser trochanter, displaced medially  Mild bilateral degenerative hip joint narrowing  Osteophyte formation  Diffuse coarsened reticular pattern throughout the LEFT hemipelvis present previously, without gross change compared to priors, presumably representing Paget's or less likely fibrous dysplasia    This is also seen to a lesser degree through the region of the RIGHT femoral intertrochanteric fracture  Soft tissues are unremarkable  Vascular calcification present  Impression: RIGHT intertrochanteric fracture and displacement of the lesser trochanter medially  This is seen on a background of coarse and heterogeneous reticular changes within the RIGHT hip as well as throughout the LEFT hemipelvis  These findings were present previously and 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  Workstation performed: [id number]     Xr Knee 1 Or 2 Vw Right    Result Date: 11/24/2017  Narrative: RIGHT KNEE INDICATION:  Right knee pain  COMPARISON: None VIEWS:  AP and lateral IMAGES:  2 FINDINGS: There is no acute fracture or dislocation  There is no joint effusion  No degenerative changes  No lytic or blastic lesions are seen  There are atherosclerotic calcifications  Soft tissues are otherwise unremarkable  Impression: No acute osseous abnormality  Workstation performed: [id number]     Ct Head Without Contrast    Result Date: 11/23/2017  Narrative: CT BRAIN - WITHOUT CONTRAST INDICATION:  Fall COMPARISON:  CT head performed on 8/30/2017 TECHNIQUE:  CT examination of the brain was performed  In addition to axial images, coronal reformatted images were created and submitted for interpretation  Radiation dose length product (DLP) for this visit:  1186 01 mGy-cm   This examination, like all CT scans performed in the [facility], was performed utilizing techniques to minimize radiation dose exposure, including the use of iterative reconstruction and automated exposure control  IMAGE QUALITY:  Diagnostic  FINDINGS:  PARENCHYMA:  There is no acute intracranial hemorrhage, mass effect or midline shift  No extra-axial collection identified  Gray-white differentiation is maintained  Patchy areas of periventricular and deep white matter hypoattenuation noted  While nonspecific, these likely reflect changes of chronic microvascular ischemia in a patient of this age  Bilateral basal ganglia infarcts  Mild to moderate cerebral volume loss  VENTRICLES AND EXTRA-AXIAL SPACES:  Ventricles are commensurate with the degree of volume loss  Basal cisterns are patent  Atherosclerotic calcification of the intracranial vasculature is noted  VISUALIZED ORBITS AND PARANASAL SINUSES:  Unremarkable  CALVARIUM AND EXTRACRANIAL SOFT TISSUES:   Normal      Impression: No acute intracranial normality or significant change from 8/30/2017 Workstation performed: [id number]     Ct Cervical Spine Without Contrast    Result Date: 11/23/2017  Narrative: CT CERVICAL SPINE - WITHOUT CONTRAST INDICATION: Fall COMPARISON: None  TECHNIQUE:  CT examination of the cervical spine was performed without intravenous contrast   Contiguous axial images were obtained  Sagittal and coronal reconstructions were performed  Radiation dose length product (DLP) for this visit:  363 73 mGy-cm   This examination, like all CT scans performed in the [facility], was performed utilizing techniques to minimize radiation dose exposure, including the use of iterative  reconstruction and automated exposure control  IMAGE QUALITY:  Diagnostic  FINDINGS: ALIGNMENT:  2 to 3 mm of retrolisthesis of C3 on C4 and 1 to 2 mm of anterolisthesis of C6 on C7  VERTEBRAL BODIES:  No acute fracture  Multilevel degenerative changes  No suspicious lytic or blastic lesion  DEGENERATIVE CHANGES:  Moderate multilevel cervical degenerative changes are noted  No critical central canal stenosis  PREVERTEBRAL AND PARASPINAL SOFT TISSUES: Bilateral carotid calcifications        THORACIC INLET:  Normal      Impression: No acute fracture or traumatic listhesis  Moderate spondylosis  Workstation performed: [id number]     Xr Chest Pa Only    Result Date: 11/24/2017  Narrative: CHEST  INDICATION: Preoperative exam  COMPARISON:  3/21/2015  VIEWS:   AP frontal; 1 image FINDINGS: Left chest wall pacemaker is present   The cardiomediastinal silhouette is stable  The lungs are clear  No pleural effusion  Osseous structures are age appropriate  Impression: No active disease  Workstation performed: [id number]     Imaging Reports Reviewed by myself    Cultures:   Blood Culture: No results found for: BLOODCX  Urine Culture: No results found for: URINECX  Sputum Culture: No components found for: SPUTUMCX  Wound Culture: No results found for: WOUNDCULT    Last 24 Hours Medication List:     acetaminophen 650 mg Oral Q6H [address]   amLODIPine 5 mg Oral Daily   ascorbic acid 500 mg Oral BID   aspirin 81 mg Oral Daily   carBAMazepine 300 mg Oral TID   docusate sodium 100 mg Oral BID   enoxaparin 30 mg Subcutaneous Daily   insulin lispro 1-5 Units Subcutaneous 4x Daily (AC & HS)   levothyroxine 75 mcg Oral Early Morning   lisinopril 5 mg Oral HS   metoprolol succinate 100 mg Oral Daily   multivitamin-minerals 1 tablet Oral Daily        Today, Patient Was Seen By: Jez Couch MD    ** Please Note: Dragon 360 Dictation voice to text software may have been used in the creation of this document   **

## 2017-11-26 NOTE — PROGRESS NOTES
Transfer Note - ICU/Stepdown Transfer to [facility]/MS tele   Ijeoma Vaz 80 y o  male MRN: [id number]  [facility]   Unit/Bed#: 2 [place] [address] Encounter: [id number]    Code Status: Level 1 - Full Code    Reason for ICU/Stepdown admission: Post-op with multiple comorbid factors    Active problems: Principal Problem:    Closed fracture of right hip ([facility] [address] )  Active Problems:    Acute blood loss anemia    Frequent falls    Primary hypothyroidism    Diabetes mellitus ([facility] [address] )    Arthritis    Aortic valve stenosis    Benign essential hypertension    Pacemaker  Resolved Problems:    * No resolved hospital problems  *      1  Closed Fracture right hip- Ortho following, PT working with patient; pain well controlled; will need rehab  2  Blood loss anemia- 1u PRBC given, follow Hgb recheck tonight and again in am  3  Restarted home meds    Consultants:   · Ortho    History of Present Illness/Summary of clinical course: 80 y o  male with past medical history of trigeminal neuralgia on carbamazepine and botulism toxin injection, complete heart block status post pacemaker placement, hypertension, impaired glucose tolerance, hypothyroidism, colon cancer status post colectomy, frequent fall, hyponatremia secondary to carbamazepine, arthritis who presents after a fall at home  Patient lives at assisted living facility and was visiting his family  He fell while trying to ambulate with walker after getting up, he lost his balance and fell on his right side hitting his right side of the head and hip  Patient subsequently developed a right thigh pain and could not bear weight  Patient does have a history of frequent fall and his balance has not been so good  Patient was subsequently brought to emergency room for further evaluation & workup  X-ray of the right hip revealed right intertrochanteric fracture and patient was subsequently admitted for further evaluation treatment  Patient was taken to the OR for IM nailing and subsequently came to [facility] for evaluation post-op secondary to his extensive PMHx  Did well after sgy and required little pain meds, but he did have a drop in Hgb which he received 1 unit PRBC  Please refer to today's progress note for further clinical details  Recent or scheduled procedures: Right hip IM nail    Outstanding/pending diagnostics: none       Mobilization Plan: WB as tolerated    Nutrition Plan: Regular    Discharge Plan: Rehab     Specific Diagnosis Plan:    [  ] Family aware of transfer out of critical care: yes     Spoke with Dr Andree Santos regarding transfer @ [phone]  Patient accepted to their service      WILLIAM Galicia

## 2017-11-26 NOTE — PROGRESS NOTES
Progress Note - Cardiology   Marilyn Marty 80 y o  male MRN: [id number]  Unit/Bed#: 708 [facility] Encounter: [id number]  11/26/17  12:39 PM        Assessment:  1  S/P hip fracture repair - surgery was uncomplicated  No evidence of CHF after procedure  No significant events on telemetry  May DC telemetry monitoring  - If any shortness of breath or chest discomfort develop, troponin should be obtained  2  SSS - s/p pacemaker - appears to be functioning well   3  Hypertension - BP well controlled post procedure  4  DM - insulin management per hospitalist   5  Tachycardia - no further episodes  DC telemetry       Plan:  As above  Subjective: Richardson Márquez denies any chest pain or shortness of breath  No overnight events        Meds/Allergies   current meds:   Current Facility-Administered Medications   Medication Dose Route Frequency    acetaminophen (TYLENOL) tablet 650 mg  650 mg Oral Q6H [address]    amLODIPine (NORVASC) tablet 5 mg  5 mg Oral Daily    ascorbic acid (VITAMIN C) tablet 500 mg  500 mg Oral BID    aspirin chewable tablet 81 mg  81 mg Oral Daily    carBAMazepine (TEGretol) tablet 300 mg  300 mg Oral TID    docusate sodium (COLACE) capsule 100 mg  100 mg Oral BID    enoxaparin (LOVENOX) subcutaneous injection 30 mg  30 mg Subcutaneous Daily    HYDROmorphone (DILAUDID) 1 mg/mL injection 0 2 mg  0 2 mg Intravenous Q3H PRN    insulin lispro (HumaLOG) 100 units/mL subcutaneous injection 1-5 Units  1-5 Units Subcutaneous 4x Daily (AC & HS)    levothyroxine tablet 75 mcg  75 mcg Oral Early Morning    lisinopril (ZESTRIL) tablet 5 mg  5 mg Oral HS    metoprolol succinate (TOPROL-XL) 24 hr tablet 100 mg  100 mg Oral Daily    multivitamin-minerals (CENTRUM) tablet 1 tablet  1 tablet Oral Daily    ondansetron (ZOFRAN) injection 4 mg  4 mg Intravenous Q6H PRN    oxyCODONE (ROXICODONE) IR tablet 5 mg  5 mg Oral Q4H PRN    polyethylene glycol (MIRALAX) packet 17 g  17 g Oral Daily PRN    senna (SENOKOT) tablet 8 6 mg  1 tablet Oral HS PRN     Allergies   Allergen Reactions    Amoxicillin-Pot Clavulanate     Clindamycin Other (See Comments)    Dilantin  [Phenytoin Sodium Extended] Other (See Comments)    Levaquin [Levofloxacin]     Penicillins        Objective   Vitals: Blood pressure 133/74, pulse 88, temperature 98 1 °F (36 7 °C), temperature source Tympanic, resp  rate 18, height 5' 3" (1 6 m), weight 62 6 kg (138 lb 0 1 oz), SpO2 96 %  , Body mass index is 24 45 kg/m²  Intake/Output Summary (Last 24 hours) at 11/26/17 1239  Last data filed at 11/26/17 0554   Gross per 24 hour   Intake             1020 ml   Output              700 ml   Net              320 ml       Physical Exam   Constitutional: He appears healthy  No distress  HENT:   Nose: Nose normal    Mouth/Throat: Oropharynx is clear  Eyes: Conjunctivae are normal  Pupils are equal, round, and reactive to light  Neck: Neck supple  No JVD present  Cardiovascular: Normal rate and regular rhythm  Exam reveals no distant heart sounds and no friction rub  Murmur heard  Systolic murmur is present with a grade of 2/6  at the upper left sternal border  Pulmonary/Chest: Effort normal and breath sounds normal  He has no wheezes  He has no rales  Abdominal: Soft  He exhibits no distension  There is no tenderness  Musculoskeletal: He exhibits no edema  Neurological: He is alert and oriented to person, place, and time  Skin: Skin is warm and dry  No rash noted         Lab Results:     Troponins:       Recent Results (from the past 24 hour(s))   Fingerstick Glucose (POCT)    Collection Time: 11/25/17  4:24 PM   Result Value Ref Range    POC Glucose 231 (H) 65 - 140 mg/dl   Hemoglobin and hematocrit, blood    Collection Time: 11/25/17  6:13 PM   Result Value Ref Range    Hemoglobin 8 7 (L) 14 0 - 18 0 g/dL    Hematocrit 25 4 (L) 42 0 - 52 0 %   Fingerstick Glucose (POCT)    Collection Time: 11/25/17  9:24 PM   Result Value Ref Range    POC Glucose 242 (H) 65 - 140 mg/dl   Prepare RBC:Has consent been obtained?  Yes, 1 Units    Collection Time: 11/26/17  6:15 AM   Result Value Ref Range    Unit Product Code M2285H29     Unit Number [id number]     Unit ABO O     Unit RH NEG     Crossmatch Compatible     Unit Dispense Status Presumed Trans    CBC and differential    Collection Time: 11/26/17  7:04 AM   Result Value Ref Range    WBC 9 00 4 80 - 10 80 Thousand/uL    RBC 2 62 (L) 4 70 - 6 10 Million/uL    Hemoglobin 7 9 (L) 14 0 - 18 0 g/dL    Hematocrit 23 3 (L) 42 0 - 52 0 %    MCV 89 82 - 98 fL    MCH 30 2 27 0 - 31 0 pg    MCHC 33 9 31 4 - 37 4 g/dL    RDW 13 7 11 6 - 15 1 %    MPV 9 0 8 9 - 12 7 fL    Platelets 622 (L) 540 - 400 Thousands/uL    Neutrophils Relative 73 43 - 75 %    Lymphocytes Relative 16 14 - 44 %    Monocytes Relative 7 4 - 12 %    Eosinophils Relative 4 0 - 6 %    Basophils Relative 0 0 - 1 %    Neutrophils Absolute 6 70 1 85 - 7 62 Thousands/µL    Lymphocytes Absolute 1 40 0 60 - 4 47 Thousands/µL    Monocytes Absolute 0 60 0 17 - 1 22 Thousand/µL    Eosinophils Absolute 0 30 0 00 - 0 61 Thousand/µL    Basophils Absolute 0 00 0 00 - 0 10 Thousands/µL   Basic metabolic panel    Collection Time: 11/26/17  7:04 AM   Result Value Ref Range    Sodium 140 136 - 145 mmol/L    Potassium 4 0 3 5 - 5 3 mmol/L    Chloride 107 100 - 108 mmol/L    CO2 26 21 - 32 mmol/L    Anion Gap 7 4 - 13 mmol/L    BUN 31 (H) 5 - 25 mg/dL    Creatinine 1 07 0 60 - 1 30 mg/dL    Glucose 198 (H) 65 - 140 mg/dL    Calcium 8 4 8 3 - 10 1 mg/dL    eGFR 57 ml/min/1 73sq m   Magnesium    Collection Time: 11/26/17  7:04 AM   Result Value Ref Range    Magnesium 2 3 1 6 - 2 6 mg/dL   Phosphorus    Collection Time: 11/26/17  7:04 AM   Result Value Ref Range    Phosphorus 2 5 2 3 - 4 1 mg/dL   Fingerstick Glucose (POCT)    Collection Time: 11/26/17  7:54 AM   Result Value Ref Range    POC Glucose 220 (H) 65 - 140 mg/dl        Cardiac testing:     EKG: Personally reviewed  Paced rhythm    Imaging: I have personally reviewed pertinent reports

## 2017-11-27 LAB
ANION GAP SERPL CALCULATED.3IONS-SCNC: 7 MMOL/L (ref 4–13)
BASOPHILS # BLD AUTO: 0 THOUSANDS/ΜL (ref 0–0.1)
BASOPHILS NFR BLD AUTO: 0 % (ref 0–1)
BUN SERPL-MCNC: 30 MG/DL (ref 5–25)
CALCIUM SERPL-MCNC: 8.6 MG/DL (ref 8.3–10.1)
CHLORIDE SERPL-SCNC: 105 MMOL/L (ref 100–108)
CO2 SERPL-SCNC: 26 MMOL/L (ref 21–32)
CREAT SERPL-MCNC: 1.05 MG/DL (ref 0.6–1.3)
EOSINOPHIL # BLD AUTO: 0.5 THOUSAND/ΜL (ref 0–0.61)
EOSINOPHIL NFR BLD AUTO: 5 % (ref 0–6)
ERYTHROCYTE [DISTWIDTH] IN BLOOD BY AUTOMATED COUNT: 13.9 % (ref 11.6–15.1)
GFR SERPL CREATININE-BSD FRML MDRD: 59 ML/MIN/1.73SQ M
GLUCOSE SERPL-MCNC: 183 MG/DL (ref 65–140)
GLUCOSE SERPL-MCNC: 210 MG/DL (ref 65–140)
GLUCOSE SERPL-MCNC: 215 MG/DL (ref 65–140)
GLUCOSE SERPL-MCNC: 273 MG/DL (ref 65–140)
GLUCOSE SERPL-MCNC: 330 MG/DL (ref 65–140)
HCT VFR BLD AUTO: 24.7 % (ref 42–52)
HGB BLD-MCNC: 8.4 G/DL (ref 14–18)
LYMPHOCYTES # BLD AUTO: 2.5 THOUSANDS/ΜL (ref 0.6–4.47)
LYMPHOCYTES NFR BLD AUTO: 22 % (ref 14–44)
MCH RBC QN AUTO: 30.3 PG (ref 27–31)
MCHC RBC AUTO-ENTMCNC: 34 G/DL (ref 31.4–37.4)
MCV RBC AUTO: 89 FL (ref 82–98)
MONOCYTES # BLD AUTO: 0.7 THOUSAND/ΜL (ref 0.17–1.22)
MONOCYTES NFR BLD AUTO: 6 % (ref 4–12)
NEUTROPHILS # BLD AUTO: 7.8 THOUSANDS/ΜL (ref 1.85–7.62)
NEUTS SEG NFR BLD AUTO: 67 % (ref 43–75)
PLATELET # BLD AUTO: 116 THOUSANDS/UL (ref 130–400)
PMV BLD AUTO: 9.2 FL (ref 8.9–12.7)
POTASSIUM SERPL-SCNC: 4.2 MMOL/L (ref 3.5–5.3)
RBC # BLD AUTO: 2.76 MILLION/UL (ref 4.7–6.1)
SODIUM SERPL-SCNC: 138 MMOL/L (ref 136–145)
WBC # BLD AUTO: 11.5 THOUSAND/UL (ref 4.8–10.8)

## 2017-11-27 PROCEDURE — 80048 BASIC METABOLIC PNL TOTAL CA: CPT | Performed by: INTERNAL MEDICINE

## 2017-11-27 PROCEDURE — 85025 COMPLETE CBC W/AUTO DIFF WBC: CPT | Performed by: INTERNAL MEDICINE

## 2017-11-27 PROCEDURE — 97110 THERAPEUTIC EXERCISES: CPT

## 2017-11-27 PROCEDURE — 82948 REAGENT STRIP/BLOOD GLUCOSE: CPT

## 2017-11-27 RX ORDER — INSULIN GLARGINE 100 [IU]/ML
8 INJECTION, SOLUTION SUBCUTANEOUS
Status: DISCONTINUED | OUTPATIENT
Start: 2017-11-27 | End: 2017-11-29 | Stop reason: HOSPADM

## 2017-11-27 RX ORDER — FUROSEMIDE 10 MG/ML
40 INJECTION INTRAMUSCULAR; INTRAVENOUS ONCE
Status: COMPLETED | OUTPATIENT
Start: 2017-11-27 | End: 2017-11-27

## 2017-11-27 RX ORDER — POTASSIUM CHLORIDE 20 MEQ/1
20 TABLET, EXTENDED RELEASE ORAL ONCE
Status: COMPLETED | OUTPATIENT
Start: 2017-11-27 | End: 2017-11-27

## 2017-11-27 RX ADMIN — INSULIN LISPRO 3 UNITS: 100 INJECTION, SOLUTION INTRAVENOUS; SUBCUTANEOUS at 21:54

## 2017-11-27 RX ADMIN — METOPROLOL SUCCINATE 100 MG: 100 TABLET, FILM COATED, EXTENDED RELEASE ORAL at 10:13

## 2017-11-27 RX ADMIN — LEVOTHYROXINE SODIUM 75 MCG: 75 TABLET ORAL at 05:55

## 2017-11-27 RX ADMIN — OXYCODONE HYDROCHLORIDE AND ACETAMINOPHEN 500 MG: 500 TABLET ORAL at 10:14

## 2017-11-27 RX ADMIN — ASPIRIN 81 MG 81 MG: 81 TABLET ORAL at 10:12

## 2017-11-27 RX ADMIN — POTASSIUM CHLORIDE 20 MEQ: 1500 TABLET, EXTENDED RELEASE ORAL at 14:27

## 2017-11-27 RX ADMIN — AMLODIPINE BESYLATE 5 MG: 5 TABLET ORAL at 10:14

## 2017-11-27 RX ADMIN — FUROSEMIDE 40 MG: 10 INJECTION, SOLUTION INTRAMUSCULAR; INTRAVENOUS at 14:27

## 2017-11-27 RX ADMIN — ACETAMINOPHEN 650 MG: 325 TABLET, FILM COATED ORAL at 13:00

## 2017-11-27 RX ADMIN — INSULIN LISPRO 2 UNITS: 100 INJECTION, SOLUTION INTRAVENOUS; SUBCUTANEOUS at 10:15

## 2017-11-27 RX ADMIN — OXYCODONE HYDROCHLORIDE AND ACETAMINOPHEN 500 MG: 500 TABLET ORAL at 17:24

## 2017-11-27 RX ADMIN — ACETAMINOPHEN 650 MG: 325 TABLET, FILM COATED ORAL at 05:55

## 2017-11-27 RX ADMIN — INSULIN GLARGINE 8 UNITS: 100 INJECTION, SOLUTION SUBCUTANEOUS at 21:53

## 2017-11-27 RX ADMIN — CARBAMAZEPINE 300 MG: 200 TABLET ORAL at 16:06

## 2017-11-27 RX ADMIN — CARBAMAZEPINE 300 MG: 200 TABLET ORAL at 21:52

## 2017-11-27 RX ADMIN — CARBAMAZEPINE 300 MG: 200 TABLET ORAL at 10:12

## 2017-11-27 RX ADMIN — INSULIN LISPRO 2 UNITS: 100 INJECTION, SOLUTION INTRAVENOUS; SUBCUTANEOUS at 16:10

## 2017-11-27 RX ADMIN — Medication 1 TABLET: at 10:13

## 2017-11-27 RX ADMIN — DOCUSATE SODIUM 100 MG: 100 CAPSULE, LIQUID FILLED ORAL at 17:24

## 2017-11-27 RX ADMIN — ACETAMINOPHEN 650 MG: 325 TABLET, FILM COATED ORAL at 17:24

## 2017-11-27 RX ADMIN — LISINOPRIL 5 MG: 5 TABLET ORAL at 21:51

## 2017-11-27 RX ADMIN — ENOXAPARIN SODIUM 30 MG: 30 INJECTION SUBCUTANEOUS at 10:14

## 2017-11-27 RX ADMIN — INSULIN LISPRO 4 UNITS: 100 INJECTION, SOLUTION INTRAVENOUS; SUBCUTANEOUS at 12:21

## 2017-11-27 RX ADMIN — DOCUSATE SODIUM 100 MG: 100 CAPSULE, LIQUID FILLED ORAL at 10:14

## 2017-11-27 NOTE — SOCIAL WORK
SW following to monitor needs and assist with DCP  Pt is a resident of the [facility]  Pt was admitted with a hip fracture and had surgery  STR placement is now being recommended  Referral was made to [facility] last week  Pt has been accepted and there is a bed available for pt at [facility]  Pt can be transferred to [facility] whenever ready  SW will continue to follow to monitor progress and assist with transfer when ordered

## 2017-11-27 NOTE — OCCUPATIONAL THERAPY NOTE
OT TREATMENT     11/27/17 1017   Restrictions/Precautions   Weight Bearing Precautions Per Order Yes   RLE Weight Bearing Per Order WBAT   Other Precautions Fall Risk;Hard of hearing;Bed Alarm; Chair Alarm;Pain   Pain Assessment   Pain Assessment Fam-Baker FACES   Fam-Baker FACES Pain Rating 6   Pain Type Acute pain;Surgical pain   Pain Location Hip   Pain Orientation Right   ADL   LB Dressing Assistance 1  Total Assistance   LB Dressing Deficit Don/doff R shoe;Don/doff L shoe   LB Dressing Comments Pt sat edge of bed and required total assist to don bilateral shoes  Bed Mobility   Supine to Sit 3  Moderate assistance   Transfers   Sit to Stand 3  Moderate assistance   Stand to Sit 3  Moderate assistance   Stand pivot 3  Moderate assistance   Functional Mobility   Functional Mobility 3  Moderate assistance   Additional items Rolling walker  (unsteady, poor use of RW, 5 steps to bedside chair)   ROM- Right Upper Extremities   R Shoulder AROM; Flexion; Horizontal ABduction;ABduction  (protraction/retraction)   R Elbow AROM;Elbow extension;Elbow flexion  (lateral elbow flex/ext)   R Position Seated  (bedside chair)   R Weight/Reps/Sets 1x10 reps   ROM - Left Upper Extremities    L Shoulder AROM; Flexion;ABduction;Horizontal ABduction  (protraction/retraction)   L Elbow AROM;Elbow flexion;Elbow extension  (lateral elbow flex/ext)   L Position Seated  (bedside chair)   L Weight/Reps/Sets 1x10 reps   Cognition   Overall Cognitive Status WFL   Arousal/Participation Cooperative   Activity Tolerance   Activity Tolerance Patient limited by pain; Patient limited by fatigue   Assessment   Assessment Received pt in bed  Agreeable for OOB activity  General mod A to mobilize to bedside chair  Pt educated with visual demonstration, the proper way to use RW to reduce fall risk  Pt sat in bedside chair for BUE exercise  Tolerated well

## 2017-11-27 NOTE — PLAN OF CARE
Problem: DISCHARGE PLANNING - CARE MANAGEMENT  Goal: Discharge to post-acute care or home with appropriate resources  INTERVENTIONS:  - Conduct assessment to determine patient/family and health care team treatment goals, and need for post-acute services based on payer coverage, community resources, and patient preferences, and barriers to discharge  - Address psychosocial, clinical, and financial barriers to discharge as identified in assessment in conjunction with the patient/family and health care team  - Arrange appropriate level of post-acute services according to patient's   needs and preference and payer coverage in collaboration with the physician and health care team  - Communicate with and update the patient/family, physician, and health care team regarding progress on the discharge plan  - Arrange short term rehab placement  - Arrange appropriate transportation to post-acute venues  Outcome: Progressing   STR placement is planned  Referral was made to [facility]  [facility] has been accepted by [facility] and there is [facility] available [facility] pt at [facility]  Pt can be transferred to [facility] whenever ready

## 2017-11-27 NOTE — PHYSICAL THERAPY NOTE
PT TREATMENT     11/27/17 1420   Pain Assessment   Pain Assessment Fam-Baker FACES   Fam-Baker FACES Pain Rating 4  (R hip area)   Restrictions/Precautions   Other Precautions Fall Risk;Bed Alarm; Chair Alarm  [facility]!!)   General   Chart Reviewed Yes   Family/Caregiver Present No   Cognition   Arousal/Participation Cooperative   Subjective   Subjective patient reports fatigue from being up in chair for several hours   Bed Mobility   Sit to Supine 3  Moderate assistance   Additional items Assist x 1;Verbal cues;LE management   Transfers   Sit to Stand 3  Moderate assistance   Additional items Assist x 1   Stand to Sit 3  Moderate assistance   Additional items Assist x 1   Ambulation/Elevation   Gait Assistance 3  Moderate assist   Additional items Assist x 1   Assistive Device Rolling walker   Distance 5 feet with change in direction   Exercises   Hip Flexion Sitting;5 reps;Bilateral   Knee AROM Long Arc Quad Sitting;10 reps;Bilateral   Ankle Pumps Sitting;10 reps;Bilateral   Assessment   Assessment patient cooperative but fatigued this session and will benefit from continued PT with progression as tolerated  Plan   Treatment/Interventions ADL retraining;Functional transfer training;LE strengthening/ROM; Therapeutic exercise; Endurance training;Patient/family training;Equipment eval/education; Bed mobility;Gait training   PT Frequency Once a day   Recommendation   Recommendation (STR)

## 2017-11-27 NOTE — PROGRESS NOTES
Progress Note - Orthopedics   Chris Jordan 80 y o  male MRN: [id number]  Unit/Bed#: 2 [place] [address] Encounter: [id number]    Assessment:  1) POD#3 s/p R TFN  2) Paget's disease    Plan:  Vancomycin 1g IV x 2 for 24 hours postop- completed  DVT prophylaxis: Lovenox 30mg Sq Qdaily/SCD's/Ambulation- will need Lovenox 30 mg subcu q day x6 weeks for DVT prophylaxis  WBAT  PT/OT- WBAT  Analgesia PRN  Spinal resolved- motor/sense function intact  Neuro checks Q 4  Follow up AM labs: hgb 8 4 this AM  s/p 1 UPRBC 11/25/17, pt asymptomatic since transfusion  Continue to monitor hgb, would have low threshold for repeat transfusion  Continue medical management per SLIM  Dressing- monitor for drainage, may leave dressing in place x 7 days  May remove dressing after 7 days postop  Discharge planning - pending progress with PT, likely d/c back to [facility]  Will need to follow up in 2 weeks from surgical date for staple removal from right hip    Weight bearing:  Weight-bearing as tolerated    VTE Pharmacologic Prophylaxis: Enoxaparin (Lovenox)   VTE Mechanical Prophylaxis: sequential compression device    Subjective: Pt seen and examined  Sitting in chair at bedside  Pain in right hip controlled  Vitals: Blood pressure 170/76, pulse 80, temperature 98 3 °F (36 8 °C), temperature source Oral, resp  rate 18, height 5' 3" (1 6 m), weight 62 5 kg (137 lb 12 6 oz), SpO2 98 %  ,Body mass index is 24 41 kg/m²        Intake/Output Summary (Last 24 hours) at 11/27/17 1225  Last data filed at 11/27/17 0800   Gross per 24 hour   Intake              240 ml   Output             1101 ml   Net             -861 ml       Invasive Devices     Peripheral Intravenous Line            Peripheral IV 11/24/17 Right Wrist 2 days                Physical Exam: NAD  Ortho Exam: Dsg c/d/i, thigh soft,  proximal thigh edema however appropriate for postoperative course compartments soft, knee extension without pain,  + DF/PF/EHL,  +L2-S1 SILT, DP2+, foot warm      Laurio Oneyda NELSON    Division of Adult Reconstruction  Department of [facility]

## 2017-11-27 NOTE — PROGRESS NOTES
[facility] Progress Note  Patient: Yue Pace 80 y o  male   MRN: [id number]  PCP: Sera Mcgregor DO  Unit/Bed#: 2 [place] [address] Encounter: [id number]  Date Of Visit: 11/27/17    Problem List:    Principal Problem:    Closed fracture of right hip ([facility] [address] )  Active Problems:    Frequent falls    Primary hypothyroidism    Diabetes mellitus ([facility] [address] )    Arthritis    Aortic valve stenosis    Benign essential hypertension    Pacemaker    Acute blood loss anemia      Assessment & Plan:    1  Right hip intertrochanteric fracture status post TFN (POD#3) - continue PT/OT, DVT prophylaxis and current pain control  Continue to  follow-up labs  2  Status post fall, likely mechanical; history of recurrent fall-PT/OT  3  Normocytic anemia, acute on chronic secondary to blood loss related to 1 status post 1 PRBC-monitor H&H  Remained stable at present  Will transfuse if less than hemoglobin 7 5  4  Uncontrolled hypertension-somewhat elevated  Continue Norvasc, metoprolol and lisinopril  Lasix x1  5  Acute on chronic diastolic CHF secondary to volume overload-IV Lasix x1  Monitor  6  Status post Acute kidney injury on CKD stage 3-  Monitor with IV Lasix  7  History of impaired glucose tolerance with hemoglobin A1c of 6 1-blood sugar remained significantly elevated   Will add basal Lantus  continue on corrective scale  8  Hypothyroidism-on Synthroid  TSH noted  T4 within normal limit  9  History of trigeminal neurology, intractable-currently controlled on carbamazepine   10  History of sick sinus syndrome k status post pacemaker placement  11  History of SIADH-sodium level is stable continue to monitor, avoid hypotonic solution  Fluid restriction  12   History of colon cancer status post colectomy in remission      VTE Pharmacologic Prophylaxis:   Pharmacologic: Enoxaparin (Lovenox)  Mechanical VTE Prophylaxis in Place: Yes    Patient Centered Rounds: I have performed bedside rounds with nursing staff today     Discussions with Specialists or Other Care Team Provider: Yes, Dr Elbert Solorio    Education and Discussions with Family / Patient:Yes    Time Spent for Care: 45 minutes  More than 50% of total time spent on counseling and coordination of care as described above  Current Length of Stay: 4 day(s)    Current Patient Status: Inpatient , requires monitoring of respiratory status    Discharge Plan:  Anticipate rehab in 1-2 days    Code Status: Level 1 - Full Code      Subjective:   Noted to be hypoxic at night  Denies chest pain or shortness of breath  Right hip pain only with movement  Denies urinary complaints      Objective:   Not in distress, sitting up in chair  Constipation improved    Negative for Chest Pain, Palpitations, Shortness of Breath, Abdominal Pain, Nausea, Vomiting, Diarrhea, Dizziness  All other 10 review of systems negative and without drastic interval changes from yesterday  Vitals:   Temp (24hrs), Av 7 °F (37 1 °C), Min:98 2 °F (36 8 °C), Max:99 5 °F (37 5 °C)    HR:  [80-87] 80  Resp:  [18] 18  BP: (130-170)/(68-76) 170/76  SpO2:  [91 %-98 %] 98 % on 2 L  Body mass index is 24 41 kg/m²  Input and Output Summary (last 24 hours): Intake/Output Summary (Last 24 hours) at 17 1410  Last data filed at 17 0800   Gross per 24 hour   Intake              240 ml   Output              901 ml   Net             -661 ml       Physical Exam:     Physical Exam   Constitutional: He is oriented to person, place, and time  He appears well-developed  No distress  HENT:   Head: Normocephalic and atraumatic  Nose: Nose normal    Mouth/Throat: Oropharynx is clear and moist    Eyes: Conjunctivae and EOM are normal  Pupils are equal, round, and reactive to light  Neck: Normal range of motion  Neck supple  No JVD present  Cardiovascular: Normal rate and regular rhythm  Exam reveals no gallop and no friction rub  Murmur heard    Pacemaker in place   Pulmonary/Chest: Effort normal  No respiratory distress  He has decreased breath sounds  He has no wheezes  He has no rhonchi  He has rales in the right lower field and the left lower field  He exhibits no tenderness  Abdominal: Soft  Bowel sounds are normal  He exhibits no distension  There is no tenderness  There is no rebound and no guarding  Musculoskeletal: He exhibits edema  Right hip dressing in place   Neurological: He is alert and oriented to person, place, and time  No cranial nerve deficit  At his baseline   Skin: Skin is warm and dry  No rash noted  Psychiatric: He has a normal mood and affect  Forgetful    Additional Data:     Labs:      Results from last 7 days  Lab Units 11/27/17  0610   WBC Thousand/uL 11 50*   HEMOGLOBIN g/dL 8 4*   HEMATOCRIT % 24 7*   PLATELETS Thousands/uL 116*   NEUTROS PCT % 67   LYMPHS PCT % 22   MONOS PCT % 6   EOS PCT % 5       Results from last 7 days  Lab Units 11/27/17  0609  11/25/17  0517   SODIUM mmol/L 138  < > 141   POTASSIUM mmol/L 4 2  < > 4 2   CHLORIDE mmol/L 105  < > 108   CO2 mmol/L 26  < > 25   BUN mg/dL 30*  < > 30*   CREATININE mg/dL 1 05  < > 0 89   CALCIUM mg/dL 8 6  < > 8 0*   TOTAL PROTEIN g/dL  --   --  5 6*   BILIRUBIN TOTAL mg/dL  --   --  0 40   ALK PHOS U/L  --   --  93   ALT U/L  --   --  17   AST U/L  --   --  15   GLUCOSE RANDOM mg/dL 183*  < > 212*   < > = values in this interval not displayed  Results from last 7 days  Lab Units 11/23/17  1539   INR  1 02     Telemetry reviewed with Cardiology  * I Have Reviewed All Lab Data Listed Above  * Additional Pertinent Lab Tests Reviewed: Clarice 66 Admission Reviewed    Imaging:  Xr Hip/pelv 2-3 Vws Right    Result Date: 11/23/2017  Narrative: RIGHT HIP INDICATION:  Fall, RIGHT hip pain COMPARISON: Mayra 3, 2013 VIEWS: AP pelvis and 2 coned down views of the hip IMAGES:  3 FINDINGS: There is a nondisplaced RIGHT intertrochanteric fracture without dislocation or significant angulation    Mild displacement of the comminuted fracture fragment through the lesser trochanter, displaced medially  Mild bilateral degenerative hip joint narrowing  Osteophyte formation  Diffuse coarsened reticular pattern throughout the LEFT hemipelvis present previously, without gross change compared to priors, presumably representing Paget's or less likely fibrous dysplasia  This is also seen to a lesser degree through the region of the RIGHT femoral intertrochanteric fracture  Soft tissues are unremarkable  Vascular calcification present  Impression: RIGHT intertrochanteric fracture and displacement of the lesser trochanter medially  This is seen on a background of coarse and heterogeneous reticular changes within the RIGHT hip as well as throughout the LEFT hemipelvis  These findings were present previously and 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  Workstation performed: [id number]     Xr Knee 1 Or 2 Vw Right    Result Date: 11/24/2017  Narrative: RIGHT KNEE INDICATION:  Right knee pain  COMPARISON: None VIEWS:  AP and lateral IMAGES:  2 FINDINGS: There is no acute fracture or dislocation  There is no joint effusion  No degenerative changes  No lytic or blastic lesions are seen  There are atherosclerotic calcifications  Soft tissues are otherwise unremarkable  Impression: No acute osseous abnormality  Workstation performed: [id number]     Ct Head Without Contrast    Result Date: 11/23/2017  Narrative: CT BRAIN - WITHOUT CONTRAST INDICATION:  Fall COMPARISON:  CT head performed on 8/30/2017 TECHNIQUE:  CT examination of the brain was performed  In addition to axial images, coronal reformatted images were created and submitted for interpretation  Radiation dose length product (DLP) for this visit:  1186 01 mGy-cm     This examination, like all CT scans performed in the [facility], was performed utilizing techniques to minimize radiation dose exposure, including the use of iterative reconstruction and automated exposure control  IMAGE QUALITY:  Diagnostic  FINDINGS:  PARENCHYMA:  There is no acute intracranial hemorrhage, mass effect or midline shift  No extra-axial collection identified  Gray-white differentiation is maintained  Patchy areas of periventricular and deep white matter hypoattenuation noted  While nonspecific, these likely reflect changes of chronic microvascular ischemia in a patient of this age  Bilateral basal ganglia infarcts  Mild to moderate cerebral volume loss  VENTRICLES AND EXTRA-AXIAL SPACES:  Ventricles are commensurate with the degree of volume loss  Basal cisterns are patent  Atherosclerotic calcification of the intracranial vasculature is noted  VISUALIZED ORBITS AND PARANASAL SINUSES:  Unremarkable  CALVARIUM AND EXTRACRANIAL SOFT TISSUES:   Normal      Impression: No acute intracranial normality or significant change from 8/30/2017 Workstation performed: [id number]     Ct Cervical Spine Without Contrast    Result Date: 11/23/2017  Narrative: CT CERVICAL SPINE - WITHOUT CONTRAST INDICATION: Fall COMPARISON: None  TECHNIQUE:  CT examination of the cervical spine was performed without intravenous contrast   Contiguous axial images were obtained  Sagittal and coronal reconstructions were performed  Radiation dose length product (DLP) for this visit:  363 73 mGy-cm   This examination, like all CT scans performed in the [facility], was performed utilizing techniques to minimize radiation dose exposure, including the use of iterative  reconstruction and automated exposure control  IMAGE QUALITY:  Diagnostic  FINDINGS: ALIGNMENT:  2 to 3 mm of retrolisthesis of C3 on C4 and 1 to 2 mm of anterolisthesis of C6 on C7  VERTEBRAL BODIES:  No acute fracture  Multilevel degenerative changes  No suspicious lytic or blastic lesion  DEGENERATIVE CHANGES:  Moderate multilevel cervical degenerative changes are noted  No critical central canal stenosis   PREVERTEBRAL AND PARASPINAL SOFT TISSUES: Bilateral carotid calcifications        THORACIC INLET:  Normal      Impression: No acute fracture or traumatic listhesis  Moderate spondylosis  Workstation performed: [id number]     Xr Chest Pa Only    Result Date: 11/24/2017  Narrative: CHEST  INDICATION: Preoperative exam  COMPARISON:  3/21/2015  VIEWS:   AP frontal; 1 image FINDINGS: Left chest wall pacemaker is present  The cardiomediastinal silhouette is stable  The lungs are clear  No pleural effusion  Osseous structures are age appropriate  Impression: No active disease  Workstation performed: [id number]     Imaging Reports Reviewed by myself    Cultures:   Blood Culture: No results found for: BLOODCX  Urine Culture: No results found for: URINECX  Sputum Culture: No components found for: SPUTUMCX  Wound Culture: No results found for: WOUNDCULT    Last 24 Hours Medication List:     acetaminophen 650 mg Oral Q6H [address]   amLODIPine 5 mg Oral Daily   ascorbic acid 500 mg Oral BID   aspirin 81 mg Oral Daily   carBAMazepine 300 mg Oral TID   docusate sodium 100 mg Oral BID   enoxaparin 30 mg Subcutaneous Daily   insulin lispro 1-5 Units Subcutaneous 4x Daily (AC & HS)   levothyroxine 75 mcg Oral Early Morning   lisinopril 5 mg Oral HS   metoprolol succinate 100 mg Oral Daily   multivitamin-minerals 1 tablet Oral Daily        Today, Patient Was Seen By: Rema Paris MD    ** Please Note: Dragon 360 Dictation voice to text software may have been used in the creation of this document   **

## 2017-11-27 NOTE — CASE MANAGEMENT
Continued Stay Review    Date: 11/27/17    Vital Signs: /70   Pulse 72   Temp 98 4 °F (36 9 °C) (Oral)   Resp 18   Ht 5' 3" (1 6 m)   Wt 62 5 kg (137 lb 12 6 oz)   SpO2 100%   BMI 24 41 kg/m²       IV LASIX 40 MG   KDUR 20 MEQ   Medications:   Scheduled Meds:   acetaminophen 650 mg Oral Q6H ESTELLE   amLODIPine 5 mg Oral Daily   ascorbic acid 500 mg Oral BID   aspirin 81 mg Oral Daily   carBAMazepine 300 mg Oral TID   docusate sodium 100 mg Oral BID   enoxaparin 30 mg Subcutaneous Daily   insulin glargine 8 Units Subcutaneous HS   insulin lispro 1-5 Units Subcutaneous 4x Daily (AC & HS)   levothyroxine 75 mcg Oral Early Morning   lisinopril 5 mg Oral HS   metoprolol succinate 100 mg Oral Daily   multivitamin-minerals 1 tablet Oral Daily     Continuous Infusions:    PRN Meds: HYDROmorphone    ondansetron    oxyCODONE    polyethylene glycol    senna    Abnormal Labs/Diagnostic Results:     Age/Sex: 80 y o  male     SURGERY     Assessment:  1) POD#3 s/p R TFN  2) Paget's disease     Plan:  Vancomycin 1g IV x 2 for 24 hours postop- completed  DVT prophylaxis: Lovenox 30mg Sq Qdaily/SCD's/Ambulation- will need Lovenox 30 mg subcu q day x6 weeks for DVT prophylaxis  WBAT  PT/OT- WBAT  Analgesia PRN  Spinal resolved- motor/sense function intact  Neuro checks Q 4  Follow up AM labs: hgb 8 4 this AM  s/p 1 UPRBC 11/25/17, pt asymptomatic since transfusion  Continue to monitor hgb, would have low threshold for repeat transfusion           Continue medical management per SLIM  Dressing- monitor for drainage, may leave dressing in place x 7 days  May remove dressing after 7 days postop  Discharge planning - pending progress with PT, likely [facility] back to [facility]    Will need to follow up in 2 weeks from surgical date for staple removal from right hip     Weight bearing:  Weight-bearing as tolerated     VTE Pharmacologic Prophylaxis: Enoxaparin (Lovenox)   VTE Mechanical Prophylaxis: sequential compression device       PER MD  Principal Problem:    Closed fracture of right hip (HCC)  Active Problems:    Frequent falls    Primary hypothyroidism    Diabetes mellitus ([address] )    Arthritis    Aortic valve stenosis    Benign essential hypertension    Pacemaker    Acute blood loss anemia        Assessment & Plan:     1  Right hip intertrochanteric fracture status post TFN (POD#3) - continue PT/OT, DVT prophylaxis and current pain control  Continue to  follow-up labs  2  Status post fall, likely mechanical; history of recurrent fall-PT/OT  3  Normocytic anemia, acute on chronic secondary to blood loss related to 1 status post 1 PRBC-monitor H&H  Remained stable at present  Will transfuse if less than hemoglobin 7 5  4  Uncontrolled hypertension-somewhat elevated  Continue Norvasc, metoprolol and lisinopril  Lasix x1  5  Acute on chronic diastolic CHF secondary to volume overload-IV Lasix x1  Monitor  6  Status post Acute kidney injury on CKD stage 3-  Monitor with IV Lasix  7  History of impaired glucose tolerance with hemoglobin A1c of 6 1-blood sugar remained significantly elevated   Will add basal Lantus  continue on corrective scale  8  Hypothyroidism-on Synthroid  TSH noted  T4 within normal limit  9  History of trigeminal neurology, intractable-currently controlled on carbamazepine   10  History of sick sinus syndrome k status post pacemaker placement  11  History of SIADH-sodium level is stable continue to monitor, avoid hypotonic solution  Fluid restriction  12   History of colon cancer status post colectomy in remission

## 2017-11-28 LAB
ABO GROUP BLD: NORMAL
ANION GAP SERPL CALCULATED.3IONS-SCNC: 6 MMOL/L (ref 4–13)
BASOPHILS # BLD AUTO: 0 THOUSANDS/ΜL (ref 0–0.1)
BASOPHILS NFR BLD AUTO: 0 % (ref 0–1)
BLD GP AB SCN SERPL QL: NEGATIVE
BUN SERPL-MCNC: 34 MG/DL (ref 5–25)
CALCIUM SERPL-MCNC: 8.2 MG/DL (ref 8.3–10.1)
CHLORIDE SERPL-SCNC: 103 MMOL/L (ref 100–108)
CO2 SERPL-SCNC: 28 MMOL/L (ref 21–32)
CREAT SERPL-MCNC: 1.02 MG/DL (ref 0.6–1.3)
EOSINOPHIL # BLD AUTO: 0.4 THOUSAND/ΜL (ref 0–0.61)
EOSINOPHIL NFR BLD AUTO: 5 % (ref 0–6)
ERYTHROCYTE [DISTWIDTH] IN BLOOD BY AUTOMATED COUNT: 14.6 % (ref 11.6–15.1)
GFR SERPL CREATININE-BSD FRML MDRD: 61 ML/MIN/1.73SQ M
GLUCOSE SERPL-MCNC: 181 MG/DL (ref 65–140)
GLUCOSE SERPL-MCNC: 207 MG/DL (ref 65–140)
GLUCOSE SERPL-MCNC: 209 MG/DL (ref 65–140)
GLUCOSE SERPL-MCNC: 239 MG/DL (ref 65–140)
HCT VFR BLD AUTO: 22.5 % (ref 42–52)
HCT VFR BLD AUTO: 23.1 % (ref 42–52)
HGB BLD-MCNC: 7.6 G/DL (ref 14–18)
HGB BLD-MCNC: 7.8 G/DL (ref 14–18)
LYMPHOCYTES # BLD AUTO: 1.4 THOUSANDS/ΜL (ref 0.6–4.47)
LYMPHOCYTES NFR BLD AUTO: 17 % (ref 14–44)
MCH RBC QN AUTO: 30.5 PG (ref 27–31)
MCHC RBC AUTO-ENTMCNC: 33.8 G/DL (ref 31.4–37.4)
MCV RBC AUTO: 90 FL (ref 82–98)
MONOCYTES # BLD AUTO: 0.6 THOUSAND/ΜL (ref 0.17–1.22)
MONOCYTES NFR BLD AUTO: 7 % (ref 4–12)
NEUTROPHILS # BLD AUTO: 6.1 THOUSANDS/ΜL (ref 1.85–7.62)
NEUTS SEG NFR BLD AUTO: 72 % (ref 43–75)
NRBC BLD AUTO-RTO: 0 /100 WBCS
PLATELET # BLD AUTO: 119 THOUSANDS/UL (ref 130–400)
PMV BLD AUTO: 8.7 FL (ref 8.9–12.7)
POTASSIUM SERPL-SCNC: 4.5 MMOL/L (ref 3.5–5.3)
RBC # BLD AUTO: 2.49 MILLION/UL (ref 4.7–6.1)
RH BLD: POSITIVE
SODIUM SERPL-SCNC: 137 MMOL/L (ref 136–145)
SPECIMEN EXPIRATION DATE: NORMAL
WBC # BLD AUTO: 8.4 THOUSAND/UL (ref 4.8–10.8)

## 2017-11-28 PROCEDURE — 86920 COMPATIBILITY TEST SPIN: CPT

## 2017-11-28 PROCEDURE — 86850 RBC ANTIBODY SCREEN: CPT | Performed by: STUDENT IN AN ORGANIZED HEALTH CARE EDUCATION/TRAINING PROGRAM

## 2017-11-28 PROCEDURE — 97110 THERAPEUTIC EXERCISES: CPT

## 2017-11-28 PROCEDURE — 85014 HEMATOCRIT: CPT | Performed by: STUDENT IN AN ORGANIZED HEALTH CARE EDUCATION/TRAINING PROGRAM

## 2017-11-28 PROCEDURE — 85018 HEMOGLOBIN: CPT | Performed by: STUDENT IN AN ORGANIZED HEALTH CARE EDUCATION/TRAINING PROGRAM

## 2017-11-28 PROCEDURE — 97530 THERAPEUTIC ACTIVITIES: CPT

## 2017-11-28 PROCEDURE — 80048 BASIC METABOLIC PNL TOTAL CA: CPT | Performed by: INTERNAL MEDICINE

## 2017-11-28 PROCEDURE — P9021 RED BLOOD CELLS UNIT: HCPCS

## 2017-11-28 PROCEDURE — 82948 REAGENT STRIP/BLOOD GLUCOSE: CPT

## 2017-11-28 PROCEDURE — 85025 COMPLETE CBC W/AUTO DIFF WBC: CPT | Performed by: INTERNAL MEDICINE

## 2017-11-28 PROCEDURE — 86900 BLOOD TYPING SEROLOGIC ABO: CPT | Performed by: STUDENT IN AN ORGANIZED HEALTH CARE EDUCATION/TRAINING PROGRAM

## 2017-11-28 PROCEDURE — 86901 BLOOD TYPING SEROLOGIC RH(D): CPT | Performed by: STUDENT IN AN ORGANIZED HEALTH CARE EDUCATION/TRAINING PROGRAM

## 2017-11-28 RX ADMIN — METOPROLOL SUCCINATE 100 MG: 100 TABLET, FILM COATED, EXTENDED RELEASE ORAL at 09:44

## 2017-11-28 RX ADMIN — CARBAMAZEPINE 300 MG: 200 TABLET ORAL at 21:57

## 2017-11-28 RX ADMIN — INSULIN LISPRO 2 UNITS: 100 INJECTION, SOLUTION INTRAVENOUS; SUBCUTANEOUS at 22:00

## 2017-11-28 RX ADMIN — INSULIN LISPRO 1 UNITS: 100 INJECTION, SOLUTION INTRAVENOUS; SUBCUTANEOUS at 09:47

## 2017-11-28 RX ADMIN — INSULIN GLARGINE 8 UNITS: 100 INJECTION, SOLUTION SUBCUTANEOUS at 21:59

## 2017-11-28 RX ADMIN — DOCUSATE SODIUM 100 MG: 100 CAPSULE, LIQUID FILLED ORAL at 09:44

## 2017-11-28 RX ADMIN — ACETAMINOPHEN 650 MG: 325 TABLET, FILM COATED ORAL at 06:42

## 2017-11-28 RX ADMIN — DOCUSATE SODIUM 100 MG: 100 CAPSULE, LIQUID FILLED ORAL at 17:28

## 2017-11-28 RX ADMIN — OXYCODONE HYDROCHLORIDE AND ACETAMINOPHEN 500 MG: 500 TABLET ORAL at 17:28

## 2017-11-28 RX ADMIN — ACETAMINOPHEN 650 MG: 325 TABLET, FILM COATED ORAL at 14:02

## 2017-11-28 RX ADMIN — INSULIN LISPRO 1 UNITS: 100 INJECTION, SOLUTION INTRAVENOUS; SUBCUTANEOUS at 17:29

## 2017-11-28 RX ADMIN — ASPIRIN 81 MG 81 MG: 81 TABLET ORAL at 09:45

## 2017-11-28 RX ADMIN — OXYCODONE HYDROCHLORIDE AND ACETAMINOPHEN 500 MG: 500 TABLET ORAL at 09:45

## 2017-11-28 RX ADMIN — Medication 1 TABLET: at 09:46

## 2017-11-28 RX ADMIN — LISINOPRIL 5 MG: 5 TABLET ORAL at 21:57

## 2017-11-28 RX ADMIN — ENOXAPARIN SODIUM 30 MG: 30 INJECTION SUBCUTANEOUS at 09:46

## 2017-11-28 RX ADMIN — ACETAMINOPHEN 650 MG: 325 TABLET, FILM COATED ORAL at 17:28

## 2017-11-28 RX ADMIN — LEVOTHYROXINE SODIUM 75 MCG: 75 TABLET ORAL at 06:42

## 2017-11-28 RX ADMIN — CARBAMAZEPINE 300 MG: 200 TABLET ORAL at 09:44

## 2017-11-28 RX ADMIN — CARBAMAZEPINE 300 MG: 200 TABLET ORAL at 17:27

## 2017-11-28 RX ADMIN — AMLODIPINE BESYLATE 5 MG: 5 TABLET ORAL at 09:45

## 2017-11-28 NOTE — PHYSICAL THERAPY NOTE
PT TREATMENT     11/28/17 1100   Pain Assessment   Pain Assessment Fam-Baker FACES   Fam-Baker FACES Pain Rating 6  (R hip area with movement)   Restrictions/Precautions   RLE Weight Bearing Per Order WBAT   Other Precautions Chair Alarm; Bed Alarm; Fall Risk   General   Chart Reviewed Yes   Family/Caregiver Present No   Cognition   Arousal/Participation Cooperative   Subjective   Subjective patient reports continued pain with movement of R LE   Bed Mobility   Supine to Sit Unable to assess  (patient receiving blood )   Ambulation/Elevation   Gait Assistance Not tested  (receiving blood)   Exercises   Heelslides Sitting;10 reps;Bilateral   Glute Sets Sitting;5 reps   Hip Flexion Sitting;10 reps;AAROM; Right  (AROM RLE)   Knee AROM Long Arc Quad Sitting;10 reps;AAROM; Right  (AROM RLE)   Ankle Pumps Sitting;10 reps;Bilateral   Assessment   Assessment patient limited with functional mobility today due to receiving blood but cooperative for strengthening and ROM  Patient will benefit from continued PT with progression as tolerated  Plan   Treatment/Interventions ADL retraining;Functional transfer training;LE strengthening/ROM; Therapeutic exercise; Endurance training;Patient/family training;Equipment eval/education; Bed mobility;Gait training   PT Frequency Once a day   Recommendation   Recommendation (STR)

## 2017-11-28 NOTE — PROGRESS NOTES
[facility] Progress Note  Patient: Ina Naylor 80 y o  male   MRN: [id number]  PCP: Pete Villagomez DO  Unit/Bed#: 2 [place] [address] Encounter: [id number]  Date Of Visit: 11/28/17    Problem List:    Principal Problem:    Closed fracture of right hip ([facility] [address] )  Active Problems:    Frequent falls    Primary hypothyroidism    Diabetes mellitus ([facility] [address] )    Arthritis    Aortic valve stenosis    Benign essential hypertension    Pacemaker    Acute blood loss anemia      Assessment & Plan:    · S/p right TFN for intertrochanteric fracture  PT/OT recommends STR once medically stable  · Post op acute blood loss anemia: Hg continues to downtrend today, will transfuse 1 unit PRBC  Monitor Hg overnight and is remains stable can DC tomorrow  · S/p mechanical fall- STR   · HTN  Cont with norvasc, metoprolol, lisinipril  · Acute on chronic diastolic HF 2/2 volume overload- got 1 dose IV lasix  Monitor  · 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- resolved  Monitor renal function  · Pre diabetes  Hemoglobin A1c 6 1  Patient was hyperglycemic  Started on basal Lantus and ISS  · Hypothyroidism  Continue Synthroid  · History of trigeminal neuralgia  Continue carbamazepine  · History of sick sinus syndrome status post pacemaker  · History of SIADH  Sodium level acceptable  · History of colon cancer status post colectomy in remission      VTE Pharmacologic Prophylaxis:   Pharmacologic: Enoxaparin (Lovenox)  Mechanical VTE Prophylaxis in Place: Yes    Patient Centered Rounds: I have performed bedside rounds with nursing staff today  Discussions with Specialists or Other Care Team Provider: Yes    Education and Discussions with Family / Patient:Yes    Time Spent for Care: 30 minutes  More than 50% of total time spent on counseling and coordination of care as described above      Current Length of Stay: 5 day(s)    Current Patient Status: Inpatient     Discharge Plan: STR once medically stable, possibly tomorrow    Code Status: Level 1 - Full Code      Subjective:   Valeria Suarez has some right hip pain with movement, but mild and improving daily  Negative for Chest Pain, Palpitations, Shortness of Breath, Abdominal Pain, Nausea, Vomiting, Constipation, Diarrhea, Dizziness  All other 10 review of systems negative and without drastic interval changes from yesterday  Objective:       Vitals:   Temp (24hrs), Av 5 °F (36 9 °C), Min:98 4 °F (36 9 °C), Max:98 7 °F (37 1 °C)    HR:  [72-84] 77  Resp:  [16-18] 16  BP: (145-162)/(64-70) 146/64  SpO2:  [96 %-100 %] 96 %  Body mass index is 24 38 kg/m²  Input and Output Summary (last 24 hours): Intake/Output Summary (Last 24 hours) at 17 1344  Last data filed at 17 1101   Gross per 24 hour   Intake              260 ml   Output             1600 ml   Net            -1340 ml       Physical Exam:     Physical Exam   Cardiovascular:   Murmur heard  Pacemaker In place   Pulmonary/Chest: Breath sounds normal    Clear but decreased   Abdominal: Soft  Bowel sounds are normal  He exhibits no distension  There is no tenderness  There is no rebound  Musculoskeletal: He exhibits edema  Right hip dressing in place, compartments soft  Neurological:   At baseline   Skin: Skin is warm and dry  Psychiatric: He has a normal mood and affect         Additional Data:     Labs:      Results from last 7 days  Lab Units 17  1006 17  0545   WBC Thousand/uL  --  8 40   HEMOGLOBIN g/dL 7 8* 7 6*   HEMATOCRIT % 23 1* 22 5*   PLATELETS Thousands/uL  --  119*   NEUTROS PCT %  --  72   LYMPHS PCT %  --  17   MONOS PCT %  --  7   EOS PCT %  --  5       Results from last 7 days  Lab Units 17  0545  17  0517   SODIUM mmol/L 137  < > 141   POTASSIUM mmol/L 4 5  < > 4 2   CHLORIDE mmol/L 103  < > 108   CO2 mmol/L 28  < > 25   BUN mg/dL 34*  < > 30*   CREATININE mg/dL 1 02  < > 0 89   CALCIUM mg/dL 8 2*  < > 8 0*   TOTAL PROTEIN g/dL  --   --  5 6*   BILIRUBIN TOTAL mg/dL  --   --  0 40 ALK PHOS U/L  --   --  93   ALT U/L  --   --  17   AST U/L  --   --  15   GLUCOSE RANDOM mg/dL 181*  < > 212*   < > = values in this interval not displayed  Results from last 7 days  Lab Units 11/23/17  1539   INR  1 02       * I Have Reviewed All Lab Data Listed Above  * Additional Pertinent Lab Tests Reviewed: All Labs Within Last 24 Hours Reviewed    Imaging:  Xr Hip/pelv 2-3 Vws Right If Performed    Result Date: 11/25/2017  Narrative: RIGHT HIP INDICATION:  Right hip fracture internal fixation in OR COMPARISON: November 23, 2017 VIEWS: AP and lateral spot fluorograms of the right hip were obtained in the operating suite at the discretion of the surgeon  Fluoroscopy time: 123 seconds IMAGES:  3+ dose summary page FINDINGS: Initial image Initial spot fluorograms demonstrates a surgical instrument overlying the right greater trochanter extending to the proximal femoral metadiaphysis  Subsequent images demonstrate intramedullary jacob with femoral neck nail and distal interlocking screw transfixing an intertrochanteric fracture with fracture fragments in gross anatomic alignment  Fine bony detail is not observed due to limitation of the equipment and technique  Impression: Control provided during internal fixation of right intertrochanteric fracture  Please refer to operative report for further information    Workstation performed: [id number]     Xr Hip/pelv 2-3 Vws Right If Performed    Result Date: 11/25/2017  Narrative: RIGHT HIP INDICATION:  Postop ORIF right hip COMPARISON: November 23, 2017 VIEWS: AP pelvis and 2 coned down views of the hip IMAGES:  3 FINDINGS: There is been placement of a nail and intramedullary jacob transfixing the intertrochanteric hip fracture on the right  Alignment of fracture fragments is near anatomic  Coarse trabecular pattern noted within the osseous structures of the pelvis suggesting underlying Paget's disease   Mild degenerative changes are seen in the contralateral left hip  Soft tissue disruption seen overlying the surgical site Multiple cutaneous staples are seen in place     Impression: Satisfactory alignment and appearance of right intertrochanteric hip fracture status post ORIF  Workstation performed: [id number]     Xr Hip/pelv 2-3 Vws Right    Result Date: 11/23/2017  Narrative: RIGHT HIP INDICATION:  Fall, RIGHT hip pain COMPARISON: Mayra 3, 2013 VIEWS: AP pelvis and 2 coned down views of the hip IMAGES:  3 FINDINGS: There is a nondisplaced RIGHT intertrochanteric fracture without dislocation or significant angulation  Mild displacement of the comminuted fracture fragment through the lesser trochanter, displaced medially  Mild bilateral degenerative hip joint narrowing  Osteophyte formation  Diffuse coarsened reticular pattern throughout the LEFT hemipelvis present previously, without gross change compared to priors, presumably representing Paget's or less likely fibrous dysplasia  This is also seen to a lesser degree through the region of the RIGHT femoral intertrochanteric fracture  Soft tissues are unremarkable  Vascular calcification present  Impression: RIGHT intertrochanteric fracture and displacement of the lesser trochanter medially  This is seen on a background of coarse and heterogeneous reticular changes within the RIGHT hip as well as throughout the LEFT hemipelvis  These findings were present previously and 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  Workstation performed: [id number]     Xr Knee 1 Or 2 Vw Right    Result Date: 11/24/2017  Narrative: RIGHT KNEE INDICATION:  Right knee pain  COMPARISON: None VIEWS:  AP and lateral IMAGES:  2 FINDINGS: There is no acute fracture or dislocation  There is no joint effusion  No degenerative changes  No lytic or blastic lesions are seen  There are atherosclerotic calcifications  Soft tissues are otherwise unremarkable  Impression: No acute osseous abnormality   Workstation performed: [id number]     Ct Head Without Contrast    Result Date: 11/23/2017  Narrative: CT BRAIN - WITHOUT CONTRAST INDICATION:  Fall COMPARISON:  CT head performed on 8/30/2017 TECHNIQUE:  CT examination of the brain was performed  In addition to axial images, coronal reformatted images were created and submitted for interpretation  Radiation dose length product (DLP) for this visit:  1186 01 mGy-cm   This examination, like all CT scans performed in the [facility], was performed utilizing techniques to minimize radiation dose exposure, including the use of iterative reconstruction and automated exposure control  IMAGE QUALITY:  Diagnostic  FINDINGS:  PARENCHYMA:  There is no acute intracranial hemorrhage, mass effect or midline shift  No extra-axial collection identified  Gray-white differentiation is maintained  Patchy areas of periventricular and deep white matter hypoattenuation noted  While nonspecific, these likely reflect changes of chronic microvascular ischemia in a patient of this age  Bilateral basal ganglia infarcts  Mild to moderate cerebral volume loss  VENTRICLES AND EXTRA-AXIAL SPACES:  Ventricles are commensurate with the degree of volume loss  Basal cisterns are patent  Atherosclerotic calcification of the intracranial vasculature is noted  VISUALIZED ORBITS AND PARANASAL SINUSES:  Unremarkable  CALVARIUM AND EXTRACRANIAL SOFT TISSUES:   Normal      Impression: No acute intracranial normality or significant change from 8/30/2017 Workstation performed: [id number]     Ct Cervical Spine Without Contrast    Result Date: 11/23/2017  Narrative: CT CERVICAL SPINE - WITHOUT CONTRAST INDICATION: Fall COMPARISON: None  TECHNIQUE:  CT examination of the cervical spine was performed without intravenous contrast   Contiguous axial images were obtained  Sagittal and coronal reconstructions were performed  Radiation dose length product (DLP) for this visit:  363 73 mGy-cm     This examination, like all CT scans performed in the [facility], was performed utilizing techniques to minimize radiation dose exposure, including the use of iterative  reconstruction and automated exposure control  IMAGE QUALITY:  Diagnostic  FINDINGS: ALIGNMENT:  2 to 3 mm of retrolisthesis of C3 on C4 and 1 to 2 mm of anterolisthesis of C6 on C7  VERTEBRAL BODIES:  No acute fracture  Multilevel degenerative changes  No suspicious lytic or blastic lesion  DEGENERATIVE CHANGES:  Moderate multilevel cervical degenerative changes are noted  No critical central canal stenosis  PREVERTEBRAL AND PARASPINAL SOFT TISSUES: Bilateral carotid calcifications        THORACIC INLET:  Normal      Impression: No acute fracture or traumatic listhesis  Moderate spondylosis  Workstation performed: [id number]     Xr Chest Pa Only    Result Date: 11/24/2017  Narrative: CHEST  INDICATION: Preoperative exam  COMPARISON:  3/21/2015  VIEWS:   AP frontal; 1 image FINDINGS: Left chest wall pacemaker is present  The cardiomediastinal silhouette is stable  The lungs are clear  No pleural effusion  Osseous structures are age appropriate  Impression: No active disease   Workstation performed: [id number]     Imaging Reports Reviewed by myself    Cultures:   Blood Culture: No results found for: BLOODCX  Urine Culture: No results found for: URINECX  Sputum Culture: No components found for: SPUTUMCX  Wound Culture: No results found for: WOUNDCULT    Last 24 Hours Medication List:     acetaminophen 650 mg Oral Q6H [facility]   amLODIPine 5 mg Oral Daily   ascorbic acid 500 mg Oral BID   aspirin 81 mg Oral Daily   carBAMazepine 300 mg Oral TID   docusate sodium 100 mg Oral BID   enoxaparin 30 mg Subcutaneous Daily   insulin glargine 8 Units Subcutaneous HS   insulin lispro 1-5 Units Subcutaneous 4x Daily (AC & HS)   levothyroxine 75 mcg Oral Early Morning   lisinopril 5 mg Oral HS   metoprolol succinate 100 mg Oral Daily   multivitamin-minerals 1 tablet Oral Daily Today, Patient Was Seen By: Isidoro Schuler MD    ** Please Note: Dragon 360 Dictation voice to text software may have been used in the creation of this document   **

## 2017-11-28 NOTE — PROGRESS NOTES
Progress Note - Orthopedics   Alissa Ramirez 80 y o  male MRN: [id number]  Unit/Bed#: 2 [place] [address] Encounter: [id number]    Assessment:  1) POD #4 s/p Right TFN - stable, AVSS, pain well controlled, took steps yesterday with PT/OT, hgb 7 6 asymptomatic  2) Paget's Disease      Plan:  POD #4 s/p R TFN -   - Antibiotics:  Perioperative antibiotics completed  - Anticoagulation:  Lovenox 30 mg subcutaneous once daily, SCDs, ambulation with assistance, Lovenox will need to be administered for next 6 weeks for DVT prophylaxis  - Activity:  Weightbearing as tolerated, PT/OT ambulation with assistance   - AM lab draw:  Repeat CBC with am labs  - Analgesia: Continue p r n  medication  - Dressing: keep clean, dry, and in tact, minimal drainage noted on proximal dressing, will continue to monitor for drainage, Mepilex is not need to be removed until postoperative day 7  - after discussing with Internal Medicine Team patient will be receiving 1 unit of packed red blood cell today 11/28/17  - Disposition:  In light of the fact that patient's hemoglobin has dropped below 8 0 twice and he has required 2 units of packed red blood cell, after speaking with the Internal Medicine provider it would seem that the patient would benefit from a monitoring overnight into tomorrow with repeat hemoglobin in a m , provided hemoglobin is stable after this 1 unit of packed red blood cell patient will be ready from orthopedic standpoint to be discharged to subacute rehab  Weight bearing: WBAT    VTE Pharmacologic Prophylaxis: Enoxaparin (Lovenox)  VTE Mechanical Prophylaxis: sequential compression device    Subjective:  Patient was seen examined at bedside  Patient denies any acute events overnight  Patient currently denies any pain at rest   Patient denies any changes in his neurovascular status  Patient denies any numbness, tingling, lack of motor movement, lack of sensation above baseline    Patient reports he feels fine and wants to know when he can get transferred  Patient denies any symptoms of lightheadedness, dizziness, weakness, fatigue, shortness of breath, palpitations, racing heart  Patient reports that he was up and ambulating yesterday with PT/OT in took a couple steps  Vitals: Blood pressure 162/70, pulse 84, temperature 98 6 °F (37 °C), temperature source Oral, resp  rate 18, height 5' 3" (1 6 m), weight 62 4 kg (137 lb 10 1 oz), SpO2 97 %  ,Body mass index is 24 38 kg/m²  Intake/Output Summary (Last 24 hours) at 11/28/17 1030  Last data filed at 11/28/17 0930   Gross per 24 hour   Intake              500 ml   Output             1650 ml   Net            -1150 ml       Invasive Devices     Peripheral Intravenous Line            Peripheral IV 11/24/17 Right Wrist 3 days    Peripheral IV 11/28/17 Left Forearm less than 1 day                Physical Exam: /70   Pulse 84   Temp 98 6 °F (37 °C) (Oral)   Resp 18   Ht 5' 3" (1 6 m)   Wt 62 4 kg (137 lb 10 1 oz)   SpO2 97%   BMI 24 38 kg/m²   General appearance: alert, appears stated age and cooperative  Head: Normocephalic, without obvious abnormality, atraumatic  Skin: Skin color, texture, turgor normal  No rashes or lesions  Ortho Exam: Right Lower Extremity: Thigh and calf compartments are soft and nontender to palpation  + L3-S1 SILT  + DF/PF + EHL  No pain with passive stretch/extension of toes  DP 1+, capillary refill less than 2 seconds, and foot is warm B/L  Incision is c/d/i  Lab, Imaging and other studies:   I have personally reviewed pertinent lab results    CBC:   Lab Results   Component Value Date    WBC 8 40 11/28/2017    HGB 7 8 (L) 11/28/2017    HCT 23 1 (L) 11/28/2017    MCV 90 11/28/2017     (L) 11/28/2017    MCH 30 5 11/28/2017    MCHC 33 8 11/28/2017    RDW 14 6 11/28/2017    MPV 8 7 (L) 11/28/2017    NRBC 0 11/28/2017     CMP:   Lab Results   Component Value Date     11/28/2017     11/28/2017    CO2 28 11/28/2017    ANIONGAP 6 11/28/2017    BUN 34 (H) 11/28/2017    CREATININE 1 02 11/28/2017    GLUCOSE 181 (H) 11/28/2017    CALCIUM 8 2 (L) 11/28/2017    EGFR 61 11/28/2017

## 2017-11-28 NOTE — OCCUPATIONAL THERAPY NOTE
OT TREATMENT     11/28/17 1130   Restrictions/Precautions   Weight Bearing Precautions Per Order Yes   RLE Weight Bearing Per Order WBAT   Pain Assessment   Pain Assessment Fam-Baker FACES   Fam-Baker FACES Pain Rating 6   Pain Type Acute pain;Surgical pain   Pain Location Leg   Pain Orientation Right   Bed Mobility   Supine to Sit 3  Moderate assistance   Transfers   Sit to Stand 3  Moderate assistance   Additional items (sit to stand from bed to RW and chair to RW)   Stand to Sit 3  Moderate assistance   Stand pivot 3  Moderate assistance   ROM- Right Upper Extremities   R Shoulder AROM; Flexion   R Elbow AROM  (lateral elbow flex/ext)   R Position Seated  (bedside chair)   R Weight/Reps/Sets 1x10 reps   ROM - Left Upper Extremities    L Shoulder AROM; Flexion   L Elbow AROM  (lateral elbow flex/ext)   L Position Seated  (bedside chair)   L Weight/Reps/Sets 1x10 reps   Cognition   Overall Cognitive Status WFL   Arousal/Participation Cooperative   Activity Tolerance   Activity Tolerance Patient limited by pain   Assessment   Assessment Pt received in bed  Agreeable to OOB activity with encouragement  Bed mobility mod A  Transfer to bedside chair with min A and max verbal cues for UE placement when sitting in chair  Pt performed sit to stand transfer again from chair to RW with min A  Unsteady in stance  Seated exercise completed however minimal planes completed 2/2 pt fatigue  Pt to receive blood transfusion today  Plan   Treatment Interventions ADL retraining;Functional transfer training;UE strengthening/ROM; Endurance training;Patient/family training;Equipment evaluation/education; Compensatory technique education; Energy conservation   OT Frequency 3-5x/wk   Recommendation   OT Discharge Recommendation Short Term Rehab

## 2017-11-28 NOTE — PROGRESS NOTES
Progress Note - Orthopedics   Ina Naylor 80 y o  male MRN: [id number]  Unit/Bed#: 2 [place] [address] Encounter: [id number]    Assessment:  1) POD#4 s/p R TFN  2) Paget's disease    Plan:  Vancomycin 1g IV x 2 for 24 hours postop- completed  DVT prophylaxis: Lovenox 30mg Sq Qdaily/SCD's/Ambulation- will need Lovenox 30 mg subcu q day x6 weeks for DVT prophylaxis  WBAT  PT/OT- WBAT  Analgesia PRN  Spinal resolved- motor/sense function intact  Neuro checks Q 4  Follow up AM labs: hgb 7 6  this AM  s/p 1 UPRBC 11/25/17, Agree with 1 UPRBC today  Continue to trend hgb  Thigh is benign on exam, no signs of acute bleeding  Continue medical management per SLIM  Dressing- monitor for drainage, may leave dressing in place x 7 days  May remove dressing after 7 days postop  Discharge planning - pending progress with PT, likely d/c back to [facility]  Will need to follow up in 2 weeks from surgical date for staple removal from right hip    Weight bearing:  Weight-bearing as tolerated    VTE Pharmacologic Prophylaxis: Enoxaparin (Lovenox)   VTE Mechanical Prophylaxis: sequential compression device    Subjective: Pt seen and examined  Sitting in chair at bedside  Pain in right hip controlled  Vitals: Blood pressure 157/70, pulse 83, temperature 98 3 °F (36 8 °C), temperature source Oral, resp  rate 16, height 5' 3" (1 6 m), weight 62 4 kg (137 lb 10 1 oz), SpO2 93 %  ,Body mass index is 24 38 kg/m²        Intake/Output Summary (Last 24 hours) at 11/28/17 1848  Last data filed at 11/28/17 1701   Gross per 24 hour   Intake              260 ml   Output              875 ml   Net             -615 ml       Invasive Devices     Peripheral Intravenous Line            Peripheral IV 11/24/17 Right Wrist 4 days    Peripheral IV 11/28/17 Left Forearm less than 1 day                Physical Exam: NAD  Ortho Exam: Dsg c/d/i, thigh soft, proximal thigh swelling improved, knee extension without pain, + DF/PF/EHL, +L2-S1 SILT, DP2+, foot warm      Mirian NELSON    Division of Adult Reconstruction  Department of [facility]

## 2017-11-29 VITALS
SYSTOLIC BLOOD PRESSURE: 179 MMHG | HEIGHT: 63 IN | DIASTOLIC BLOOD PRESSURE: 77 MMHG | WEIGHT: 137.35 LBS | BODY MASS INDEX: 24.34 KG/M2 | HEART RATE: 87 BPM | OXYGEN SATURATION: 92 % | TEMPERATURE: 99.2 F | RESPIRATION RATE: 18 BRPM

## 2017-11-29 LAB
ABO GROUP BLD BPU: NORMAL
ANION GAP SERPL CALCULATED.3IONS-SCNC: 8 MMOL/L (ref 4–13)
ATRIAL RATE: 90 BPM
BASOPHILS # BLD AUTO: 0 THOUSANDS/ΜL (ref 0–0.1)
BASOPHILS NFR BLD AUTO: 0 % (ref 0–1)
BPU ID: NORMAL
BUN SERPL-MCNC: 32 MG/DL (ref 5–25)
CALCIUM SERPL-MCNC: 8.5 MG/DL (ref 8.3–10.1)
CHLORIDE SERPL-SCNC: 101 MMOL/L (ref 100–108)
CO2 SERPL-SCNC: 26 MMOL/L (ref 21–32)
CREAT SERPL-MCNC: 1 MG/DL (ref 0.6–1.3)
CROSSMATCH: NORMAL
EOSINOPHIL # BLD AUTO: 0.4 THOUSAND/ΜL (ref 0–0.61)
EOSINOPHIL NFR BLD AUTO: 4 % (ref 0–6)
ERYTHROCYTE [DISTWIDTH] IN BLOOD BY AUTOMATED COUNT: 14.8 % (ref 11.6–15.1)
GFR SERPL CREATININE-BSD FRML MDRD: 62 ML/MIN/1.73SQ M
GLUCOSE SERPL-MCNC: 169 MG/DL (ref 65–140)
GLUCOSE SERPL-MCNC: 180 MG/DL (ref 65–140)
GLUCOSE SERPL-MCNC: 213 MG/DL (ref 65–140)
GLUCOSE SERPL-MCNC: 264 MG/DL (ref 65–140)
HCT VFR BLD AUTO: 27.9 % (ref 42–52)
HCT VFR BLD AUTO: 29.1 % (ref 42–52)
HGB BLD-MCNC: 9.3 G/DL (ref 14–18)
HGB BLD-MCNC: 9.7 G/DL (ref 14–18)
LYMPHOCYTES # BLD AUTO: 1.6 THOUSANDS/ΜL (ref 0.6–4.47)
LYMPHOCYTES NFR BLD AUTO: 18 % (ref 14–44)
MCH RBC QN AUTO: 30.3 PG (ref 27–31)
MCHC RBC AUTO-ENTMCNC: 33.3 G/DL (ref 31.4–37.4)
MCV RBC AUTO: 91 FL (ref 82–98)
MONOCYTES # BLD AUTO: 0.7 THOUSAND/ΜL (ref 0.17–1.22)
MONOCYTES NFR BLD AUTO: 8 % (ref 4–12)
NEUTROPHILS # BLD AUTO: 6.5 THOUSANDS/ΜL (ref 1.85–7.62)
NEUTS SEG NFR BLD AUTO: 70 % (ref 43–75)
NRBC BLD AUTO-RTO: 0 /100 WBCS
PLATELET # BLD AUTO: 135 THOUSANDS/UL (ref 130–400)
PMV BLD AUTO: 8.5 FL (ref 8.9–12.7)
POTASSIUM SERPL-SCNC: 4.7 MMOL/L (ref 3.5–5.3)
QRS AXIS: -72 DEGREES
QRSD INTERVAL: 170 MS
QT INTERVAL: 434 MS
QTC INTERVAL: 530 MS
RBC # BLD AUTO: 3.06 MILLION/UL (ref 4.7–6.1)
SODIUM SERPL-SCNC: 135 MMOL/L (ref 136–145)
T WAVE AXIS: 105 DEGREES
UNIT DISPENSE STATUS: NORMAL
UNIT PRODUCT CODE: NORMAL
UNIT RH: NORMAL
VENTRICULAR RATE: 90 BPM
WBC # BLD AUTO: 9.2 THOUSAND/UL (ref 4.8–10.8)

## 2017-11-29 PROCEDURE — 80048 BASIC METABOLIC PNL TOTAL CA: CPT | Performed by: INTERNAL MEDICINE

## 2017-11-29 PROCEDURE — 85014 HEMATOCRIT: CPT | Performed by: STUDENT IN AN ORGANIZED HEALTH CARE EDUCATION/TRAINING PROGRAM

## 2017-11-29 PROCEDURE — 97116 GAIT TRAINING THERAPY: CPT

## 2017-11-29 PROCEDURE — 85018 HEMOGLOBIN: CPT | Performed by: STUDENT IN AN ORGANIZED HEALTH CARE EDUCATION/TRAINING PROGRAM

## 2017-11-29 PROCEDURE — 97535 SELF CARE MNGMENT TRAINING: CPT

## 2017-11-29 PROCEDURE — 85025 COMPLETE CBC W/AUTO DIFF WBC: CPT | Performed by: INTERNAL MEDICINE

## 2017-11-29 PROCEDURE — 82948 REAGENT STRIP/BLOOD GLUCOSE: CPT

## 2017-11-29 RX ORDER — SENNOSIDES 8.6 MG
1 TABLET ORAL
Qty: 120 EACH | Refills: 0 | Status: SHIPPED | OUTPATIENT
Start: 2017-11-29

## 2017-11-29 RX ORDER — DOCUSATE SODIUM 100 MG/1
100 CAPSULE, LIQUID FILLED ORAL 2 TIMES DAILY
Qty: 10 CAPSULE | Refills: 0 | Status: SHIPPED | OUTPATIENT
Start: 2017-11-29

## 2017-11-29 RX ORDER — OXYCODONE HYDROCHLORIDE 5 MG/1
5 TABLET ORAL EVERY 4 HOURS PRN
Qty: 30 TABLET | Refills: 0 | Status: SHIPPED | OUTPATIENT
Start: 2017-11-29 | End: 2017-12-09

## 2017-11-29 RX ORDER — INSULIN GLARGINE 100 [IU]/ML
8 INJECTION, SOLUTION SUBCUTANEOUS
Qty: 10 ML | Refills: 0 | Status: SHIPPED | OUTPATIENT
Start: 2017-11-29 | End: 2018-07-24

## 2017-11-29 RX ORDER — ASCORBIC ACID 500 MG
500 TABLET ORAL 2 TIMES DAILY
Refills: 0
Start: 2017-11-29

## 2017-11-29 RX ADMIN — INSULIN LISPRO 1 UNITS: 100 INJECTION, SOLUTION INTRAVENOUS; SUBCUTANEOUS at 07:45

## 2017-11-29 RX ADMIN — DOCUSATE SODIUM 100 MG: 100 CAPSULE, LIQUID FILLED ORAL at 10:06

## 2017-11-29 RX ADMIN — ACETAMINOPHEN 650 MG: 325 TABLET, FILM COATED ORAL at 17:19

## 2017-11-29 RX ADMIN — OXYCODONE HYDROCHLORIDE AND ACETAMINOPHEN 500 MG: 500 TABLET ORAL at 17:20

## 2017-11-29 RX ADMIN — ACETAMINOPHEN 650 MG: 325 TABLET, FILM COATED ORAL at 05:29

## 2017-11-29 RX ADMIN — OXYCODONE HYDROCHLORIDE AND ACETAMINOPHEN 500 MG: 500 TABLET ORAL at 10:05

## 2017-11-29 RX ADMIN — LEVOTHYROXINE SODIUM 75 MCG: 75 TABLET ORAL at 05:29

## 2017-11-29 RX ADMIN — INSULIN LISPRO 2 UNITS: 100 INJECTION, SOLUTION INTRAVENOUS; SUBCUTANEOUS at 17:22

## 2017-11-29 RX ADMIN — METOPROLOL SUCCINATE 100 MG: 100 TABLET, FILM COATED, EXTENDED RELEASE ORAL at 10:04

## 2017-11-29 RX ADMIN — INSULIN LISPRO 2 UNITS: 100 INJECTION, SOLUTION INTRAVENOUS; SUBCUTANEOUS at 12:33

## 2017-11-29 RX ADMIN — DOCUSATE SODIUM 100 MG: 100 CAPSULE, LIQUID FILLED ORAL at 17:19

## 2017-11-29 RX ADMIN — CARBAMAZEPINE 300 MG: 200 TABLET ORAL at 17:19

## 2017-11-29 RX ADMIN — AMLODIPINE BESYLATE 5 MG: 5 TABLET ORAL at 10:05

## 2017-11-29 RX ADMIN — CARBAMAZEPINE 300 MG: 200 TABLET ORAL at 10:05

## 2017-11-29 RX ADMIN — Medication 1 TABLET: at 10:05

## 2017-11-29 RX ADMIN — ACETAMINOPHEN 650 MG: 325 TABLET, FILM COATED ORAL at 12:30

## 2017-11-29 RX ADMIN — ENOXAPARIN SODIUM 30 MG: 30 INJECTION SUBCUTANEOUS at 10:04

## 2017-11-29 RX ADMIN — ASPIRIN 81 MG 81 MG: 81 TABLET ORAL at 10:05

## 2017-11-29 NOTE — DISCHARGE SUMMARY
Discharge Summary - [facility] Internal Medicine    Patient Information: José Miguel Manzanares 80 y o  male MRN: [id number]  Unit/Bed#: [facility] Encounter: [id number]    Discharging Physician / Practitioner: Merline Sans, MD  PCP: Isatu Vargas DO  Admission Date: 11/23/2017  Discharge Date: 11/29/17    Reason for Admission: Leg Pain (per EMS report, pt getting out of his chair to get his walker & fell  c/o right upper leg pain  fall occurred approx 1/2 hour ago  states he fell onto his right side , states hit shoulder , "unsure of head strike  denies LOC/denies head/neck/back pain  )      Discharge Diagnoses:     Principal Problem:    Closed fracture of right hip ([facility] [address] )  Active Problems:    Frequent falls    Primary hypothyroidism    Diabetes mellitus ([facility] [address] )    Arthritis    Aortic valve stenosis    Benign essential hypertension    Pacemaker    Acute blood loss anemia  Resolved Problems:    * No resolved hospital problems  *      Consultations During Hospital Stay:  IP CONSULT TO CASE MANAGEMENT  IP CONSULT TO CARDIOLOGY  IP CONSULT TO ORTHOPEDIC SURGERY  IP CONSULT TO MEDICAL CRITICAL CARE    Procedures Performed:     · Right TFN on 11/24/2017  · Hemoglobin A1c 6 1  · TSH 0 175, free T4 0 89      Significant Findings:     · See imaging below    Imaging while in hospital:    Xr Hip/pelv 2-3 Vws Right If Performed    Result Date: 11/25/2017  Impression: Control provided during internal fixation of right intertrochanteric fracture  Please refer to operative report for further information    Workstation performed: [id number]     Xr Hip/pelv 2-3 Vws Right If Performed  Result Date: 11/25/2017  Impression: Satisfactory alignment and appearance of right intertrochanteric hip fracture status post ORIF  Workstation performed: [id number]     Xr Hip/pelv 2-3 Vws Right  Result Date: 11/23/2017  Impression: RIGHT intertrochanteric fracture and displacement of the lesser trochanter medially    This is seen on a background of coarse and heterogeneous reticular changes within the RIGHT hip as well as throughout the LEFT hemipelvis  These findings were present previously and 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  Workstation performed: [id number]     Xr Knee 1 Or 2 Vw Right  Result Date: 11/24/2017  Impression: No acute osseous abnormality  Workstation performed: [id number]     Ct Head Without Contrast  Result Date: 11/23/2017  Impression: No acute intracranial normality or significant change from 8/30/2017 Workstation performed: [id number]     Ct Cervical Spine Without Contrast  Result Date: 11/23/2017  Impression: No acute fracture or traumatic listhesis  Moderate spondylosis  Workstation performed: [id number]     Xr Chest Pa Only  Result Date: 11/24/2017  Impression: No active disease  Workstation performed: [id number]       Incidental Findings:   · none     Test Results Pending at Discharge (will require follow up):   · As per After Visit Summary     Outpatient Tests Requested:  ·     Complications:  none    Hospital Course:     Sergio Atkinson is a 80 y o  male patient with a PMH of trigeminal neuralgia on carbamazepine and botulism toxin injection, complete heart block status post pacemaker, hypertension, impaired glucose tolerance, hypothyroidism, colon cancer status post colectomy, frequent falls, hyponatremia secondary to carbamazepine, arthritis who originally presented to the hospital on 11/23/2017 due to fall  He was visiting his family from his assisted living facility when he was trying to get up using his walker lost his balance and fell on his right side hitting the right head and hip  He developed right hip pain and could not bear weight  He was brought to the ED for evaluation  In the ED he was hemodynamically stable  CT head and C-spine were negative for acute abnormalities  X-ray of the right hip revealed a right intertrochanteric fracture and he was admitted for further treatment    He was seen by Cardiology for preop clearance because of his cardiac history and was risk stratified as higher risk  He was seen by Orthopedics and underwent right TFN  Postop he was transferred to the ICU for close monitoring for 1 day  Then he was transferred to the [facility]  He had some acute blood loss anemia with hemoglobin downtrending is low 7 3 and required 2 units of PRBCs  Postop he was recommended to have Lovenox daily for 6 weeks for DVT prophylaxis  He was weight-bearing as tolerated  He was seen by PT OT recommended short-term rehab  He was discharged to rehab with instructions to follow up with Orthopedics in 2 weeks from surgical date for staple removal        Condition at Discharge: stable     Discharge Day Visit / Exam:     Subjective:  Tori Humphrey has no complaints  He denies chest pain, shortness of breath, as abdominal pain nausea vomiting diarrhea constipation  He is eating well  His hip pain is minimal       Vitals: Blood Pressure: (!) 179/77 (11/29/17 0700)  Pulse: 87 (11/29/17 0700)  Temperature: 99 2 °F (37 3 °C) (11/29/17 0700)  Temp Source: Tympanic (11/29/17 0700)  Respirations: 18 (11/29/17 0700)  Height: 5' 3" (160 cm) (11/24/17 2039)  Weight - Scale: 62 3 kg (137 lb 5 6 oz) (11/29/17 0359)  SpO2: 92 % (11/29/17 0700)  Exam:   Physical Exam   Constitutional: He appears well-developed  HENT:   Head: Normocephalic and atraumatic  Cardiovascular: Normal rate and regular rhythm  Pulmonary/Chest: Effort normal and breath sounds normal  No respiratory distress  He has no wheezes  He has no rales  Abdominal: Soft  Bowel sounds are normal  He exhibits no distension  There is no tenderness  There is no rebound  Musculoskeletal: He exhibits no edema  Skin: Skin is warm and dry  Surgical site dressing C/D/I, BLE compartments soft warm and well perfused   Psychiatric: He has a normal mood and affect   His behavior is normal        Discharge instructions/Information to patient and family:(Discharge Medications and Follow up):   See after visit summary for information provided to patient and family  Provisions for Follow-Up Care:  See after visit summary for information related to follow-up care and any pertinent home health orders  Disposition: Short-term rehab at [facility]    Planned Readmission:  No     Discharge Statement:  I spent >30 minutes discharging the patient  This time was spent on the day of discharge  I had direct contact with the patient on the day of discharge  Greater than 50% of the total time was spent examining patient, answering all patient questions, arranging and discussing plan of care with patient as well as directly providing post-discharge instructions  Additional time then spent on discharge activities  Discharge Medications:  See after visit summary for reconciled discharge medications provided to patient and family  ** Please Note: Dragon 360 Dictation voice to text software may have been used in the creation of this document   **

## 2017-11-29 NOTE — PLAN OF CARE
Problem: OCCUPATIONAL THERAPY ADULT  Goal: Performs self-care activities at highest level of function for planned discharge setting  See evaluation for individualized goals  Outcome: Progressing  Limitation: Decreased ADL status, Decreased UE ROM, Decreased UE strength, Decreased Safe judgement during ADL, Decreased cognition, Decreased endurance, Decreased self-care trans, Decreased high-level ADLs  Prognosis: Good  Assessment: Pt with moderate pain, so required Mod/MaxA for all functional mobility and self care today  Pt is cooperative and eager to increase independence  Good rehab candidate        OT Discharge Recommendation: Short Term Rehab

## 2017-11-29 NOTE — [place] UNIVERSAL TRANSFER FORM
[place] UNIVERSAL TRANSFER FORM  (ALL ITEMS MUST BE COMPLETED)    1  TRANSFER FROM: [address]      TRANSFER TO: [facility]    2  DATE OF TRANSFER: 11/29/2017                        TIME OF TRANSFER: [facility]    3  PATIENT NAME: FLORIDA Ramirez      YOB: 1919                             GENDER: male    4  LANGUAGE:   English    5  PHYSICIAN NAME:  Reena Norton MD                   PHONE: [phone]    6  CODE STATUS: Level 1 - Full Code        Out of Hospital DNR Attached: No    7  :                                      :  Extended Emergency Contact Information  Primary Emergency Contact: Jakob Washington   [address] Phone: [phone]  Mobile Phone: [phone]  Relation: [facility] Representative/Proxy:  No           Legal Guardian:  No             NAME OF:           HEALTH CARE REPRESENTATIVE/PROXY:                                         OR           LEGAL GUARDIAN, IF NOT :                                               PHONE:  (Day)           (Night)                        (Cell)    8  REASON FOR TRANSFER: Medically stable for transport to rehab  V/S: BP (!) 179/77   Pulse 87   Temp 99 2 °F (37 3 °C) (Tympanic)   Resp 18   Ht 5' 3" (1 6 m)   Wt 62 3 kg (137 lb 5 6 oz)   SpO2 92%   BMI 24 33 kg/m²           PAIN: None    9  PRIMARY DIAGNOSIS: Closed fracture of right hip ([address] )      Secondary Diagnosis:         Pacemaker: Yes      Internal Defib: No          Mental Health Diagnosis (if Applicable):    10  RESTRAINTS: No     11  RESPIRATORY NEEDS: None    12  ISOLATION/PRECAUTION: None    13  ALLERGY: Amoxicillin-pot clavulanate; Clindamycin; Dilantin  [phenytoin sodium extended]; Levaquin [levofloxacin]; and Penicillins    14  SENSORY:       Vision Good, Hearing Hearing Aid Left and Speech Clear    15  SKIN CONDITION: Yes:  Surgical    16  DIET: Regular    17   IV ACCESS: None    18  PERSONAL ITEMS SENT WITH PATIENT: Hearing Aid Left and Dentures Lower Full    19  ATTACHED DOCUMENTS: MUST ATTACH CURRENT MEDICATION INFORMATION Face Sheet, MAR, Medication Reconciliation, TAR, POS, Diagnostic Studies, Labs, Operative Report, Respiratory Care and Advanced Directive    20  AT RISK ALERTS:Falls        HARM TO: N/A    21  WEIGHT BEARING STATUS:         Left Leg: None        Right Leg: Limited    22  MENTAL STATUS:Alert, Oriented and Forgetful    23  FUNCTION:        Walk: With Help        Transfer: With Help        Toilet: With Help        Feed: Self    24  IMMUNIZATIONS/SCREENING:     There is no immunization history on file for this patient  25  BOWEL: Periods of Incontinence Last BM 11/28/17 Medium    26  BLADDER: Incontinent    27   SENDING FACILITY CONTACT: Mariella Duverney                  Title: RN        Unit: [address]        Phone: [phone] [address] (if known):        Title:        Unit:         Phone:         FORM PREFILLED BY (if applicable)       Title:       Unit:        Phone:         FORM COMPLETED BY Sonam Persaud RN      Title: RN      Phone: [phone]

## 2017-11-29 NOTE — PLAN OF CARE
HPI Comments: 9:19 AM Juan Antonio Goldstein is a 64 y.o. female with no pertinent medical history who presents to the ED c/o upper abdominal pain which began last night around 7PM. Pt states the abdomen feels firm and that the pain radiates towards lower abdomen. Pt notes a previous appendectomy and a hernia that appeared shortly afterwards. She notes fatigue, shakiness, gas, and a knot in her navel. The knot has been there for 3 weeks. Pt agrees to smoking, but no alcohol or drug usage. She denies any other symptoms. All questions and concerns addressed at this time. PCP: Marissa Colon MD      The history is provided by the patient. History reviewed. No pertinent past medical history. Past Surgical History:   Procedure Laterality Date    HX TONSIL AND ADENOIDECTOMY           History reviewed. No pertinent family history. Social History     Social History    Marital status:      Spouse name: N/A    Number of children: N/A    Years of education: N/A     Occupational History    Not on file. Social History Main Topics    Smoking status: Current Some Day Smoker    Smokeless tobacco: Never Used    Alcohol use Yes    Drug use: No    Sexual activity: Not on file     Other Topics Concern    Not on file     Social History Narrative         ALLERGIES: Review of patient's allergies indicates no known allergies. Review of Systems   Constitutional: Positive for fatigue. Negative for fever. HENT: Negative for congestion. Respiratory: Negative for cough and shortness of breath. Cardiovascular: Negative for chest pain and leg swelling. Gastrointestinal: Positive for abdominal pain (upper abdomen above navel and lower abd below navel). Negative for nausea and vomiting. Increased gas. Genitourinary: Negative for dysuria. Musculoskeletal: Negative. Neurological: Positive for weakness. Negative for speech difficulty and headaches.    All other systems reviewed and are Problem: PHYSICAL THERAPY ADULT  Goal: Performs mobility at highest level of function for planned discharge setting  See evaluation for individualized goals  {PT CARE PLAN LTYJB:[id number]   Outcome: Progressing  Prognosis: Good  Problem List: Decreased strength, Decreased range of motion, Decreased endurance, Impaired balance, Decreased mobility, Decreased coordination, Pain  Assessment: Pt with improved amb ability today  Good tolerance to LE ex  Pt will cont to benefit from skilled PT services  Recommendation:  (STR)          See flowsheet documentation for full assessment  negative. Vitals:    03/09/17 0902 03/09/17 1020 03/09/17 1035   BP: 152/83 (!) 118/96    Pulse: (!) 110     Resp: 18     Temp: 98.3 °F (36.8 °C)     SpO2: 100% 99% 99%            Physical Exam   Constitutional: She is oriented to person, place, and time. She appears well-developed and well-nourished. No distress. HENT:   Head: Normocephalic and atraumatic. Mouth/Throat: Oropharynx is clear and moist.   Eyes: Conjunctivae and EOM are normal. Pupils are equal, round, and reactive to light. No scleral icterus. Neck: Normal range of motion. Neck supple. Cardiovascular: Normal rate, regular rhythm and normal heart sounds. No murmur heard. Pulmonary/Chest: Effort normal and breath sounds normal. No respiratory distress. Abdominal: Soft. Bowel sounds are normal. She exhibits no distension. There is no tenderness. Musculoskeletal: She exhibits no edema. Lymphadenopathy:     She has no cervical adenopathy. Neurological: She is alert and oriented to person, place, and time. Coordination normal.   Skin: Skin is warm and dry. No rash noted. Psychiatric: She has a normal mood and affect. Her behavior is normal.   Nursing note and vitals reviewed. MDM  Number of Diagnoses or Management Options  Abdominal pain, epigastric:   Diagnosis management comments: C/o epigastric pain and increased flatulence since eating lima beans last night no abd tenderness pt concerned over possible hernia none appreciated     Labs reviewed re eval no tenderness will dc home        Amount and/or Complexity of Data Reviewed  Clinical lab tests: ordered and reviewed      ED Course       Procedures    Vitals:  Patient Vitals for the past 12 hrs:   Temp Pulse Resp BP SpO2   03/09/17 1035 - - - - 99 %   03/09/17 1020 - - - (!) 118/96 99 %   03/09/17 0902 98.3 °F (36.8 °C) (!) 110 18 152/83 100 %   SpO2 reviewed and within normal limits.        Medications ordered:   Medications   sodium chloride 0.9 % bolus infusion 1,000 mL (not administered)   famotidine (PF) (PEPCID) injection 20 mg (not administered)         Lab findings:  Recent Results (from the past 12 hour(s))   CBC WITH AUTOMATED DIFF    Collection Time: 03/09/17  9:50 AM   Result Value Ref Range    WBC 10.0 4.6 - 13.2 K/uL    RBC 4.28 4.20 - 5.30 M/uL    HGB 14.8 12.0 - 16.0 g/dL    HCT 43.3 35.0 - 45.0 %    .2 (H) 74.0 - 97.0 FL    MCH 34.6 (H) 24.0 - 34.0 PG    MCHC 34.2 31.0 - 37.0 g/dL    RDW 12.9 11.6 - 14.5 %    PLATELET 368 462 - 803 K/uL    MPV 10.2 9.2 - 11.8 FL    NEUTROPHILS 54 40 - 73 %    LYMPHOCYTES 36 21 - 52 %    MONOCYTES 7 3 - 10 %    EOSINOPHILS 2 0 - 5 %    BASOPHILS 1 0 - 2 %    ABS. NEUTROPHILS 5.5 1.8 - 8.0 K/UL    ABS. LYMPHOCYTES 3.6 0.9 - 3.6 K/UL    ABS. MONOCYTES 0.7 0.05 - 1.2 K/UL    ABS. EOSINOPHILS 0.2 0.0 - 0.4 K/UL    ABS. BASOPHILS 0.1 (H) 0.0 - 0.06 K/UL    DF AUTOMATED     METABOLIC PANEL, COMPREHENSIVE    Collection Time: 03/09/17  9:50 AM   Result Value Ref Range    Sodium 137 136 - 145 mmol/L    Potassium 3.8 3.5 - 5.5 mmol/L    Chloride 102 100 - 108 mmol/L    CO2 25 21 - 32 mmol/L    Anion gap 10 3.0 - 18 mmol/L    Glucose 125 (H) 74 - 99 mg/dL    BUN 8 7.0 - 18 MG/DL    Creatinine 0.92 0.6 - 1.3 MG/DL    BUN/Creatinine ratio 9 (L) 12 - 20      GFR est AA >60 >60 ml/min/1.73m2    GFR est non-AA >60 >60 ml/min/1.73m2    Calcium 9.2 8.5 - 10.1 MG/DL    Bilirubin, total 0.3 0.2 - 1.0 MG/DL    ALT (SGPT) 24 13 - 56 U/L    AST (SGOT) 16 15 - 37 U/L    Alk.  phosphatase 87 45 - 117 U/L    Protein, total 7.3 6.4 - 8.2 g/dL    Albumin 3.9 3.4 - 5.0 g/dL    Globulin 3.4 2.0 - 4.0 g/dL    A-G Ratio 1.1 0.8 - 1.7     LIPASE    Collection Time: 03/09/17  9:50 AM   Result Value Ref Range    Lipase 184 73 - 393 U/L   URINALYSIS W/ RFLX MICROSCOPIC    Collection Time: 03/09/17 10:20 AM   Result Value Ref Range    Color YELLOW      Appearance CLEAR      Specific gravity 1.010 1.005 - 1.030      pH (UA) 6.0 5.0 - 8.0      Protein NEGATIVE  NEG mg/dL    Glucose NEGATIVE  NEG mg/dL    Ketone NEGATIVE  NEG mg/dL    Bilirubin NEGATIVE  NEG      Blood NEGATIVE  NEG      Urobilinogen 0.2 0.2 - 1.0 EU/dL    Nitrites NEGATIVE  NEG      Leukocyte Esterase NEGATIVE  NEG           X-Ray, CT or other radiology findings or impressions:  No orders to display       Orders:  Orders Placed This Encounter    CBC WITH AUTOMATED DIFF     Standing Status:   Standing     Number of Occurrences:   1    METABOLIC PANEL, COMPREHENSIVE     Standing Status:   Standing     Number of Occurrences:   1    LIPASE     Standing Status:   Standing     Number of Occurrences:   1    URINALYSIS W/ RFLX MICROSCOPIC     Standing Status:   Standing     Number of Occurrences:   1    sodium chloride 0.9 % bolus infusion 1,000 mL    famotidine (PF) (PEPCID) injection 20 mg       Reevaluation, Progress notes, Consult notes, or additional Procedure notes:   10:41 AM Patient notes pain has improved and is feeling better. Disposition:  Diagnosis:   1. Abdominal pain, epigastric        Disposition:     Follow-up Information     Follow up With Details Comments Contact [facility] EMERGENCY DEPT  As needed, If symptoms worsen [address] [address]    Crow Keane MD Schedule an appointment as soon as possible for a visit for ED follow up  [address] [place] [address] [address] [place] [address] [facility]      Yoli Sandoval MD Schedule an appointment as soon as possible for a visit for ED follow up appointment [address] [address]  [phone]             Patient's Medications   Start Taking    No medications on file   Continue Taking    LEVOFLOXACIN (LEVAQUIN) 750 MG TABLET    Take 1 Tab by mouth daily. METRONIDAZOLE (FLAGYL) 500 MG TABLET    Take 1 Tab by mouth three (3) times daily. MULTIVITAMIN (ONE A DAY) TABLET    Take 1 Tab by mouth daily.     OXYCODONE-ACETAMINOPHEN (PERCOCET) 5-325 MG PER TABLET    Take 2 Tabs by mouth every six (6) hours as needed for Pain. These Medications have changed    No medications on file   Stop Taking    No medications on file         Scribe Attestation  Staci Sarah scribing for and in the presence of Сергей Hogan MD (03/09/17)      Physician Attestation  I personally performed the services described in this documentation, reviewed and edited the documentation which was dictated to the scribe in my presence, and it accurately records my words and actions.     Сергей Hogan MD (03/09/17)      Signed by: Kriss Hunter, March 09, 2017 at 10:46 AM

## 2017-11-29 NOTE — OCCUPATIONAL THERAPY NOTE
OT TREATMENT       11/29/17 1000   Restrictions/Precautions   RLE Weight Bearing Per Order WBAT   Other Precautions Chair Alarm; Bed Alarm; Fall Risk   Pain Assessment   Pain Assessment 0-10   Pain Score 6   Pain Type Acute pain;Surgical pain   Pain Location Leg   Pain Orientation Right   [facility] Pain Intervention(s) Medication (See MAR); Repositioned;Distraction; Emotional support   ADL   Where Assessed Chair   Grooming Assistance 3  Moderate Assistance   LB Bathing Assistance 2  Maximal Assistance   Functional Standing Tolerance   Time 2 minutes   Activity LB bathing   Bed Mobility   Supine to Sit 2  Maximal assistance   Additional items Assist x 1   Transfers   Sit to Stand 3  Moderate assistance   Additional items Assist x 1; Armrests; Verbal cues   Stand to Sit 3  Moderate assistance   Additional items Assist x 1; Armrests; Verbal cues   Stand pivot 3  Moderate assistance   Additional items Assist x 1;Verbal cues   Cognition   Arousal/Participation Alert; Cooperative   Attention Attends with cues to redirect   Orientation Level Oriented X4   Following Commands Follows multistep commands with increased time or repetition   Activity Tolerance   Activity Tolerance Patient limited by pain; Patient limited by fatigue   Medical Staff Made Aware yes   Assessment   Assessment Pt with moderate pain, so required Mod/MaxA for all functional mobility and self care today  Pt is cooperative and eager to increase independence  Good rehab candidate  Plan   Treatment Interventions ADL retraining;Functional transfer training;UE strengthening/ROM; Endurance training;Patient/family training;Equipment evaluation/education; Compensatory technique education; Energy conservation   OT Frequency 3-5x/wk   Recommendation   OT Discharge Recommendation Short Term Rehab

## 2017-11-29 NOTE — NURSING NOTE
Discharged from facility with transport team on stretcher  AVS reviewed with receiving facility Nurse to nurse report given to [address] at [facility]  JUANCARLOS's removed

## 2017-11-29 NOTE — PHYSICAL THERAPY NOTE
PT TREATMENT     11/29/17 1112   Pain Assessment   Pain Assessment 0-10   Pain Score 6   Pain Type Acute pain;Surgical pain   Pain Location Leg   Pain Orientation Right   Restrictions/Precautions   RLE Weight Bearing Per Order WBAT   Other Precautions Fall Risk;Bed Alarm; Chair Alarm;Hard of hearing;Pain   General   Chart Reviewed Yes   Family/Caregiver Present No   Subjective   Subjective "I wish I was doing better"   Transfers   Sit to Stand 3  Moderate assistance   Additional items Verbal cues   Stand to Sit 3  Moderate assistance   Additional items Verbal cues   Ambulation/Elevation   Gait pattern Improper Weight shift; Antalgic;Decreased R stance; Inconsistent alcides; Step to   Gait Assistance 3  Moderate assist   Additional items Verbal cues   Assistive Device Rolling walker   Distance 5-6 feet x 2 with brief rest period   Exercises   Hip Flexion 10 reps;AAROM; Right   Knee AROM Long Arc Quad 10 reps;AAROM; Right   Ankle Pumps 10 reps;Right   Assessment   Assessment Pt with improved amb ability today  Good tolerance to LE ex  Pt will cont to benefit from skilled PT services  Plan   Treatment/Interventions ADL retraining;Functional transfer training;LE strengthening/ROM; Therapeutic exercise; Endurance training;Patient/family training;Bed mobility;Gait training   PT Frequency Once a day   Recommendation   Recommendation (STR)   Equipment Recommended (cont amb with RW and assist)

## 2017-11-29 NOTE — SOCIAL WORK
Discharge ordered  Pt will be transferred to [facility], [facility]  for skilled rehab  [facility] scheduled to transport via stretcher  Unit, [facility] and pt's daughter (Norma Rincon) aware of plan

## 2017-12-05 ENCOUNTER — ALLSCRIPTS OFFICE VISIT (OUTPATIENT)
Dept: OTHER | Facility: OTHER | Age: 82
End: 2017-12-05

## 2017-12-07 NOTE — PROGRESS NOTES
Chief Complaint  Patient present for Botox Injection  Current Meds  1  AmLODIPine Besylate 5 MG Oral Tablet; TAKE 1 TABLET TWICE DAILY  Requested for: 79LJZ4148; Last Rx:21Uto8960 Ordered  2  Aspirin 81 MG Oral Tablet Chewable; Therapy: (Recorded:09Nov2016) to Recorded  3  CarBAMazepine  MG Oral Capsule Extended Release 12 Hour; TAKE 1 CAPSULE TWICE DAILY; Therapy: 18ZDV5686 to (Evaluate:39Idu4129)  Requested for: 21Jzv9520; Last Rx:55Qtn1448 Ordered  4  Levothyroxine Sodium 137 MCG Oral Tablet; TAKE 1 TABLET DAILY; Therapy: 47SMZ2487 to (Evaluate:39Fwg3885)  Requested for: 40BDZ8886; Last Rx:93Ouc5741 Ordered  5  Lisinopril 5 MG Oral Tablet; Take 1 tablet daily  Requested for: 79Fnx2033; Last Rx:70Sdb2947 Ordered  6  Metoprolol Succinate  MG Oral Tablet Extended Release 24 Hour; TAKE ONE TABLET BY MOUTH IN THE MORNING & 1/2 IN THE EVENING; Therapy: 63AKN8490 to (Evaluate:73Vjt0167)  Requested for: 92DJK7822; Last Rx:27Cpi2080 Ordered  7  Stool Softener 100 MG Oral Capsule; TAKE 1 CAPSULE TWICE DAILY  Prn; Therapy: (Recorded:99Jmk9792) to Recorded    Allergies    1  Augmentin TABS  2  Penicillins  3  Clindamycin HCl CAPS  4  Dilantin CAPS  5  Levaquin TABS  6  TEGretol TABS    Procedure  Procedure: Neurotoxin Procedure for Facial Muscles  Indication:  Orofacial Dyskinesia  -- Hemifacial Spasm  Risks, benefits and alternatives were discussed with the patient  We discussed possible complications, including infection,-- bleeding-- and-- allergic reaction  Written consent was obtained prior to the procedure  The skin overlying the muscles to be injected was cleansed with alcohol  Procedure Note:  200 --Mml of Botulinum toxin and 2 ml of preservative free, sterile saline were mixed  -- The final concentration was 100 units per cc -- EMG guidance was not used in identifying the injection site  -- The injection was performed with a 30G, 1/2 inch needle     15 unit(s) in 2 injection(s) was injected into the right orbicularis oculi muscle    5 unit(s) in 1 injection(s) was injected into the right  muscle  10 unit(s) in 2 injection(s) was injected into the platysma muscle     30 unit(s) in 4 injection(s) was injected into the temporalis muscle   10 unit(s) in 2 injection(s) was injected into the masseter muscle    15 units right occipital muscle, 10 units in the right mandible, 10 units in the right chin (V3 distribution), medically necessary  A total of 105units were used  A total of 95units were discarded  Botox Lot:  Lot number: [id number] -- Expiration date: JULY 2020  ([address] [place] [address])  Patient Status:  the patient tolerated the procedure well  Complications:  there were no complications  Follow-up in the office in 3 months for the next set of neurotoxin injections-- and-- in month(s)  100units x 2cc saline x1      Assessment    1  Trigeminal neuralgia (350 1) (G50 0)    Plan  Chronic migraine without aura    · Administered: Botox 100 UNIT Injection Solution Reconstituted  Rx By: Deborah BARTHOLOMEW;For: Chronic migraine without aura; Dose of 200 UNIT; Intramuscular; CRUZ = N; Administered: 12/5/2017 2:32:00 PM  Trigeminal neuralgia    · Renew: CarBAMazepine  MG Oral Capsule Extended Release 12 Hour(Carbatrol); TAKE 1 CAPSULE TWICE DAILY  Rx By: Polo Sotomayor; Dispense: 30 Days ; #:60 Capsule Extended Release 12 Hour; Refill: 5;For: Trigeminal neuralgia; CRUZ = N; Verified Transmission to [address] [place] [address]; Last Updated By: System, [employer]; 12/5/2017 2:45:01 PM   · Follow-up visit in 3 months Evaluation and Treatment  Follow-up  Status: Hold For -Scheduling  Requested for: 81KIJ4660  Ordered; For: Trigeminal neuralgia; Ordered By: Polo Sotomayor  Performed:   Due: 24VPY5073   · Chemodenervation of muscles innervated by facial, trigeminal, c-spine, accessorynerves - POC; Status:Need Information - Financial Authorization; Requestedfor:12Edb3894;   Perform: In Office; Order Comments:100 units with 1 mL saline ratio ; Due:59Ymj1345; Ordered; For:Trigeminal neuralgia; Ordered By:Joselyn Ball;    Discussion/Summary    Next visit we will plan to inject a ratio of 1 mL : 100 units Botox only  Future Appointments    Date/Time Provider Specialty Site   03/13/2018 02:30 PM Remedios Tran [facility] Neurology [facility]  [place]   12/08/2017 10:00 AM Breezy Monge DO Orthopedic Surgery [facility]       Signatures   Electronically signed by :  Rj Gallegos [facility]; Dec  5 2017  3:53PM EST                       (Author)    Electronically signed by : Peter Dangelo MD; Dec  6 2017 11:15AM EST                       (Author)

## 2017-12-08 ENCOUNTER — APPOINTMENT (OUTPATIENT)
Dept: RADIOLOGY | Facility: CLINIC | Age: 82
End: 2017-12-08
Payer: COMMERCIAL

## 2017-12-08 ENCOUNTER — ALLSCRIPTS OFFICE VISIT (OUTPATIENT)
Dept: OTHER | Facility: OTHER | Age: 82
End: 2017-12-08

## 2017-12-08 DIAGNOSIS — S72.141D CLOSED DISPLACED INTERTROCHANTERIC FRACTURE OF RIGHT FEMUR WITH ROUTINE HEALING: ICD-10-CM

## 2017-12-08 DIAGNOSIS — M25.551 PAIN IN RIGHT HIP: ICD-10-CM

## 2017-12-08 PROCEDURE — 73502 X-RAY EXAM HIP UNI 2-3 VIEWS: CPT

## 2017-12-09 NOTE — PROGRESS NOTES
Assessment    1  Closed intertrochanteric fracture of hip, right, with routine healing, subsequent encounter (V54 13) (S72 141D)   2  Post-operative state (V45 89) (Z98 890)    Plan  Right hip pain    · * XR HIP/PELV 2-3 VWS RIGHT W PELVIS IF PERFORMED; Status:Active; Requestedfor:99Qqo7992;     Discussion/Summary    Richardson is a very pleasant 51-year-old male here with his daughter today for evaluation and postoperative follow-up 2 weeks status post right hip cephalomedullary nailing  Overall he is doing extremely well and progressing with his weight-bearing and PT at his rehab facility  His medical documentation supports the fact that he is still getting Lovenox for DVT prophylaxis  He will need this DVT prophylaxis for 4 more weeks His x-rays are stable and demonstrate a well-aligned well-fixed fracture with a short cephalomedullary nail  He may continue to be weight-bearing as tolerated right lower extremity while working with PT/OT  His staples were removed here in the office today without issue  He may start shower in 2 days  He may continue to receive analgesic medication as needed  I would like to see him back in approximately 4 weeks for repeat evaluation and we will get new x-rays of his right hip at that visit  Overall I am pleased with his progress that he is making postoperatively  I discussed this with the patient and patient's daughter here today and I answered all their questions  I will see him back in 4 weeks with repeat x-ray of his right hip  The patient may call the office at anytime if any questions or concerns should arise  The treatment plan was reviewed with the patient/guardian  The patient/guardian understands and agrees with the treatment plan      Chief Complaint    1  Hip Pain  Two week follow-up status post right hip cephalomedullary nailing      Post-Op  HPI: Cristal Campos is here with his daughter for his 1st postop follow-up 2 weeks status post right hip TFN   He states [facility] and has been doing well postoperatively  He denies any hip or groin pain and states that he has some achiness but it is much less than when he injured and immediately after surgery  He has been weight-bearing as tolerated at the facility and progressing with PT/OT  Overall he is pleased with his progress thus far      Review of Systems   Constitutional: No fever or chills, feels well, no tiredness, no recent weight loss or weight gain  Eyes: No complaints of red eyes, no eyesight problems  ENT: no complaints of loss of hearing, no nosebleeds, no sore throat  Cardiovascular: No complaints of chest pain, no palpitations, no leg claudication or lower extremity edema  Respiratory: No complaints of shortness of breath, no wheezing, no cough  Gastrointestinal: No complaints of abdominal pain, no constipation, no nausea or vomiting, no diarrhea or bloody stools  Genitourinary: No complaints of dysuria or incontinence, no hesitancy, no nocturia  Musculoskeletal: No complaints of arthralgia, no myalgia, no joint swelling or stiffness, no limb pain or swelling  Integumentary: No complaints of skin rash or lesion, no itching or dry skin, no skin wounds  Neurological: No complaints of headache, no confusion, no numbness or tingling, no dizziness  Psychiatric: No suicidal thoughts, no anxiety, no depression  Endocrine: No muscle weakness, no frequent urination, no excessive thirst, no feelings of weakness  ROS reviewed  Active Problems  1  Aortic stenosis, moderate (424 1) (I35 0)   2  Arrhythmia (427 9) (I49 9)   3  Arthritis involving multiple sites (716 99) (M12 9)   4  Atherosclerosis of arteries of extremities (440 20) (I70 209)   5  Benign essential hypertension (401 1) (I10)   6  Blood type O+ (V49 89) (Z67 40)   7  Body mass index (BMI) 24 0-24 9, adult (V85 1) (Z68 24)   8  Callus (700) (L84)   9  Chronic migraine without aura (346 70) (G43 709)   10   Chronic pain syndrome (338 4) (G89 4)   11  Dizziness (780 4) (R42)   12  Dyspepsia (536 8) (K30)   13  Dysphagia, unspecified type (787 20) (R13 10)   14  Edema (782 3) (R60 9)   15  Elevated liver enzymes (790 5) (R74 8)   16  Fecal soiling due to fecal incontinence (787 60) (R15 9)   17  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)   18  Gynecomastia (611 1) (N62)   19  High risk medication use (V58 69) (Z79 899)   20  Impaired fasting glucose (790 21) (R73 01)   21  Malignant neoplasm of colon (153 9) (C18 9)   22  Mixed stress and urge urinary incontinence (788 33) (N39 46)   23  Multiple falls (V15 88) (R29 6)   24  Need for prophylactic vaccination and inoculation against influenza (V04 81) (Z23)   25  Onychomycosis (110 1) (B35 1)   26  Pacemaker (V45 01) (Z95 0)   27  Pain in both feet (729 5) (M79 671,M79 672)   28  Peripheral vascular disease (443 9) (I73 9)   29  Primary hypothyroidism (244 9) (E03 9)   30  Right hip pain (719 45) (M25 551)   31  Screening for genitourinary condition (V81 6) (Z13 89)   32  Trigeminal neuralgia (350 1) (G50 0)   33  Venous insufficiency (459 81) (I87 2)    Social History     · Alcohol Use (History)   · 1 glass of wine daily   · Caffeine use (V49 89) (F15 90)   · Daily Coffee Consumption (1  Cups/Day)   · Denied: History of Drug Use   · Former smoker (V15 82) (L16 226)   · previous cigar use many years ago   · Living With Adult Children   · Marital History - Currently    · Occupation: Retired  The social history was reviewed and updated today  Current Meds   1  Acetaminophen 325 MG CAPS; Therapy: (Recorded:97Oqz0208) to Recorded   2  AmLODIPine Besylate 5 MG Oral Tablet; TAKE 1 TABLET TWICE DAILY  Requested for: 56LMW7316; Last Rx:80Ncm5216 Ordered   3  Aspirin 81 MG Oral Tablet Chewable; Therapy: (Recorded:09Nov2016) to Recorded   4  CarBAMazepine  MG Oral Capsule Extended Release 12 Hour; TAKE 1 CAPSULE TWICE DAILY;  Therapy: 76VRV4224 to (Evaluate:03Jun2018)  Requested for: 88XTV5772; Last Rx:60Sha6374 Ordered   5  Docusate Sodium 100 MG Oral Capsule; Therapy: (Recorded:44Aym2971) to Recorded   6  Dulcolax SUPP; Therapy: (Recorded:18Isu3873) to Recorded   7  Levothyroxine Sodium 137 MCG Oral Tablet; TAKE 1 TABLET DAILY; Therapy: 37MFQ8184 to (Evaluate:45Esr8420)  Requested for: 09BNV4624; Last Rx:21Eve0212 Ordered   8  Lisinopril 5 MG Oral Tablet; Take 1 tablet daily  Requested for: 16Zil8365; Last Rx:05Yed8834 Ordered   9  Lovenox 30 MG/0 3ML Subcutaneous Solution; Therapy: (Recorded:26Dnf8044) to Recorded   10  Metoprolol Succinate  MG Oral Tablet Extended Release 24 Hour; TAKE ONE  TABLET BY MOUTH IN THE MORNING & 1/2 IN THE EVENING; Therapy: 50XFF2457 to (Evaluate:02Ksu3511)  Requested for: 01XRK7817; Last  Rx:87Kvg8061 Ordered   11  Milk of Magnesia SUSP; Therapy: (Recorded:06Sfg4058) to Recorded   12  MiraLax POWD;  Therapy: (Recorded:01Vqz5190) to Recorded   13  OxyCODONE HCl - 5 MG Oral Capsule; Therapy: (Recorded:85Svq5883) to Recorded   14  Senna 8 8 MG/5ML Oral Syrup; Therapy: (Recorded:47Czh0018) to Recorded   15  Stool Softener 100 MG Oral Capsule; TAKE 1 CAPSULE TWICE DAILY  Prn;  Therapy: (Recorded:23Qgm0029) to Recorded   16  Vitamin C 500 MG Oral Capsule; Therapy: (Recorded:62Xtc0198) to Recorded   17  Voltaren 1 % GEL; Therapy: (Recorded:77Zto9926) to Recorded    The medication list was reviewed and updated today  Allergies  1  Augmentin TABS   2  Penicillins   3  Clindamycin HCl CAPS   4  Dilantin CAPS   5  Levaquin TABS   6  TEGretol TABS    Vitals   Recorded: 80RFC2738 10:23AM   Heart Rate 181   Systolic 045   Diastolic 69   Height Unobtainable Yes   Weight Unobtainable Yes       Physical Exam  General:  Awake alert orient x3, no acute distress, right lower extremity:  Right thigh incisions clean dry and intact with staples, there is no dixon-incisional erythema or drainage  There is scant swelling remaining in the thigh however normal for postoperative course    Compartments are soft compressible  Neurovascular intact right lower extremity  Constitutional - General appearance: Normal   Musculoskeletal - Muscle strength/tone: Normal -- Lower extremity compartments: Normal   Cardiovascular - Pulses: Normal   Neurologic - Sensation: Normal -- Lower extremity peripheral neuro exam: Normal   Psychiatric - Orientation to person, place, and time: Normal -- Mood and affect: Normal   Eyes  Conjunctiva and lids: Normal    Pupils and irises: Normal        Results/Data  I personally reviewed the films/images/results in the office today  My interpretation follows  X-ray Review X-rays obtained here in the office today demonstrate stable fixation of a right hip intertrochanteric fracture that is well aligned  The hardware is stable without signs of failure or change position  Procedure    Wound Exam:  Procedure Note: staples were removed  Patient Status:  the patient tolerated the procedure well  Complications:  there were no complications  Future Appointments    Date/Time Provider Specialty Site   03/13/2018 02:30 PM Akiko Glover [facility]rology [address]       Signatures   Electronically signed by :  Ruddy Banks DO; Dec  8 2017 10:50AM EST                       (Author)

## 2018-01-03 ENCOUNTER — GENERIC CONVERSION - ENCOUNTER (OUTPATIENT)
Dept: OTHER | Facility: OTHER | Age: 83
End: 2018-01-03

## 2018-01-03 ENCOUNTER — APPOINTMENT (OUTPATIENT)
Dept: RADIOLOGY | Facility: CLINIC | Age: 83
End: 2018-01-03
Payer: MEDICARE

## 2018-01-03 DIAGNOSIS — S72.141D CLOSED DISPLACED INTERTROCHANTERIC FRACTURE OF RIGHT FEMUR WITH ROUTINE HEALING: ICD-10-CM

## 2018-01-03 PROCEDURE — 73502 X-RAY EXAM HIP UNI 2-3 VIEWS: CPT

## 2018-01-12 VITALS — BODY MASS INDEX: 25.4 KG/M2 | RESPIRATION RATE: 16 BRPM | HEIGHT: 62 IN | WEIGHT: 138 LBS | HEART RATE: 80 BPM

## 2018-01-12 NOTE — MISCELLANEOUS
Message  I spoke to Richardson's daughter  She would prefer that he doesn't have any follow up with us  She said he isn't at home, he is in [place], but doing well  She stated he is 80 and wants him to rest but appreciates us following up with him  /DL      Active Problems    1  Aortic stenosis, moderate (424 1) (I35 0)   2  Arrhythmia (427 9) (I49 9)   3  Arthritis involving multiple sites (716 99) (M12 9)   4  Atherosclerosis of arteries of extremities (440 20) (I70 209)   5  Benign essential hypertension (401 1) (I10)   6  Blood type O+ (V49 89) (Z67 40)   7  Body mass index (BMI) 24 0-24 9, adult (V85 1) (Z68 24)   8  Callus (700) (L84)   9  Chronic pain syndrome (338 4) (G89 4)   10  Dizziness (780 4) (R42)   11  Dyspepsia (536 8) (K30)   12  Dysphagia, unspecified type (787 20) (R13 10)   13  Edema (782 3) (R60 9)   14  Elevated liver enzymes (790 5) (R74 8)   15  Fecal soiling due to fecal incontinence (787 60) (R15 9)   16  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)   17  Gynecomastia (611 1) (N62)   18  High risk medication use (V58 69) (Z79 899)   19  Impaired fasting glucose (790 21) (R73 01)   20  Malignant neoplasm of colon (153 9) (C18 9)   21  Mixed stress and urge urinary incontinence (788 33) (N39 46)   22  Multiple falls (V15 88) (R29 6)   23  Need for prophylactic vaccination and inoculation against influenza (V04 81) (Z23)   24  Onychomycosis (110 1) (B35 1)   25  Pacemaker (V45 01) (Z95 0)   26  Pain in both feet (729 5) (M79 671,M79 672)   27  Peripheral vascular disease (443 9) (I73 9)   28  Primary hypothyroidism (244 9) (E03 9)   29  Screening for genitourinary condition (V81 6) (Z13 89)   30  Trigeminal neuralgia (350 1) (G50 0)   31  Venous insufficiency (459 81) (I87 2)    Current Meds   1  AmLODIPine Besylate 5 MG Oral Tablet; TAKE 1 TABLET TWICE DAILY  Requested for:   65WRL6152; Last Rx:11Npu4056 Ordered   2  Aspirin 81 MG Oral Tablet Chewable; Therapy: (Recorded:09Nov2016) to Recorded   3  CarBAMazepine  MG Oral Capsule Extended Release 12 Hour (Carbatrol); TAKE   1 CAPSULE TWICE DAILY; Therapy: 40SEF8533 to (Evaluate:08Yxi5989)  Requested for: 27Flk8260; Last   Rx:91Gdz7524 Ordered   4  Levothyroxine Sodium 137 MCG Oral Tablet; TAKE 1 TABLET DAILY; Therapy: 49YHC2067 to (Evaluate:29Zla8859)  Requested for: 46NGT5700; Last   Rx:44Buu9799 Ordered   5  Lisinopril 5 MG Oral Tablet; Take 1 tablet daily  Requested for: 35Fxj3800; Last   Rx:97Wvp4408 Ordered   6  Metoprolol Succinate  MG Oral Tablet Extended Release 24 Hour; TAKE ONE   TABLET BY MOUTH IN THE MORNING & 1/2 IN THE EVENING; Therapy: 43SKG3956 to (Evaluate:76Uwo0425)  Requested for: 04OPG6883; Last   Rx:93Zep2567 Ordered   7  Stool Softener 100 MG Oral Capsule; TAKE 1 CAPSULE TWICE DAILY  Prn;   Therapy: (Recorded:23Trl3039) to Recorded    Allergies    1  Augmentin TABS   2  Penicillins   3  Clindamycin HCl CAPS   4  Dilantin CAPS   5  Levaquin TABS   6   TEGretol TABS    Signatures   Electronically signed by : Breann Lora MD; Sep 27 2017  8:34AM EST

## 2018-01-12 NOTE — RESULT NOTES
Message   please call patient  has mild carotid artery stenosis  tsh is mildly high and we can increase medication if he is feeling sluggish or fatigued  Sodium mildly low but will just monitor  rl       Verified Results  VAS CAROTID COMPLETE STUDY 50Rqu5202 10:41AM Leny Issa Order Number: [id number]    Order Number: [id number]     Test Name Result Flag Reference   VAS CAROTID COMPLETE STUDY (Report)     [facility] REPORT   CLINICAL:   Indications: Bruit [R09 89]  Operative History   Colon surgery, cancer   Hernia repair   Pace maker   Risk Factors   The patient has history of HTN, Diabetes (NIDDM) and previous smoking (quit   >10yrs ago)  He has no history of hyperlipidemia  The patient current BMI is   24 69, Weight (lb) is 135 lb and Height (in) is 62 in  Clinical   Right Pressure: 170/70 mm Hg, Left Pressure: 168/66 mm Hg  FINDINGS:      Right    Impression PSV EDV (cm/s) Direction of Flow    Dist  ICA         57     11              Mid  ICA         90     13              Prox  ICA  1 - 49%   45     12              Dist CCA         71     10              Mid CCA          92     12              Prox CCA         116     17              Ext Carotid       105                    Prox Vert         42      8 Antegrade        Subclavian        126                       Left     Impression PSV EDV (cm/s) Direction of Flow    Dist  ICA         41     12              Mid  ICA         75     14              Prox  ICA  1 - 49%   72     12              Dist CCA         75     12              Mid CCA          90     11              Prox CCA         81                    Ext Carotid        49                    Prox Vert         39      8 Antegrade        Subclavian        146                             CONCLUSION:   Impression   RIGHT:   There is <50% stenosis noted in the internal carotid artery  Plaque is   calcified and irregular  Vertebral artery flow is antegrade   There is no significant subclavian artery   disease  LEFT:   There is <50% stenosis noted in the internal carotid artery  Plaque is   calcified and irregular  Vertebral artery flow is antegrade  There is no significant subclavian artery   disease  Tech note: Acoustic shadows may obscure more significant stenosis  No prior study for comparison  Internal carotid artery stenosis determination by consensus criteria from:   Wiliam Alejandra et al  Carotid Artery Stenosis: Gray-Scale and Doppler US Diagnosis   - Society of Radiologists in [address] [place] [address], Radiology 2003;   [phone]        SIGNATURE:   Electronically Signed by: Kvng Baker MD, RPVI on 2016-07-25 08:39:02 PM

## 2018-01-12 NOTE — PROGRESS NOTES
Assessment    1  Trigeminal neuralgia (350 1) (G50 0)    Plan  Trigeminal neuralgia    · Follow-up PRN Evaluation and Treatment  Follow-up  Status: Complete  Done:  [id number] 04:46PM   Ordered; For: Trigeminal neuralgia; Ordered By: Ye Marlow Performed:  Due: [id number]    Chief Complaint  Chief Complaint Free Text Note Form: Pt is here for a f/u      History of Present Illness  HPI: We had the pleasure of evaluating Astrid Monsivais in neurological follow up today  As you know he is a 80year old right handed male  He does have history of colon cancer dx in 2012  He does have history of chronic distal lower extremity DVT and is here today for evaluation of his recurrent trigeminal neuralgia  He did have gamma knife procedure about two years ago and he did for a year had no pain but now the pain has reoccurred  His procedure was done at [facility]  He has a pace maker in place thus is not able to get an MRI  What medications do you take or have you taken for your Facial pain? PREVENTIVE: Gabapentin, Oxcarbazepine, carbamazepine, Botox, Dilantin  ABORTIVE: Percocet, aspirin, Tylenol,     For the last two weeks he is doing really well  States now the pain level is 0/10  Review of Systems  Neurological ROS:   Constitutional: no fever, no chills, no recent weight gain, no recent weight loss, no complaints of feeling tired, no changes in appetite  HEENT:  no sinus problems, not feeling congested, no blurred vision, no dryness of the eyes, no eye pain, no hearing loss, no tinnitus, no mouth sores, no sore throat, no hoarseness, no dysphagia, no masses, no bleeding  Cardiovascular:  no chest pain or pressure, no palpitations present, the heart rate was not rapid or irregular, no swelling in the arms or legs, no poor circulation  Respiratory:  no unusual or persistant cough, no shortness of breath with or without exertion     Gastrointestinal:  no nausea, no vomiting, no diarrhea, no abdominal pain, no changes in bowel habits, no melena, no loss of bowel control  Genitourinary:  no incontinence, no feelings of urinary urgency, no increase in frequency, no urinary hesitancy, no dysuria, no hematuria  Musculoskeletal:  no arthralgias, no myalgias, no immobility or loss of function, no head/neck/back pain, no pain while walking  Integumentary  no masses, no rash, no skin lesions, no livedo reticularis  Psychiatric:  no anxiety, no depression, no mood swings, no psychiatric hospitalizations, no sleep problems  Endocrine  no unusual weight loss or gain, no excessive urination, no excessive thirst, no hair loss or gain, no hot or cold intolerance, no menstrual period change or irregularity, no loss of sexual ability or drive, no erection difficulty, no nipple discharge  Hematologic/Lymphatic:  no unusual bleeding, no tendency for easy bruising, no clotting skin or lumps  Neurological General:  no headache, no nausea or vomiting, no lightheadedness, no convulsions, no blackouts, no syncope, no trauma, no photopsia, no increased sleepiness, no trouble falling asleep, no snoring, no awakening at night  Neurological Mental Status:  no confusion, no mood swings, no alteration or loss of consciousness, no difficulty expressing/understanding speech, no memory problems  Neurological Cranial Nerves:  no blurry or double vision, no loss of vision, no face drooping, no facial numbness or weakness, no taste or smell loss/changes, no hearing loss or ringing, no vertigo or dizziness, no dysphagia, no slurred speech  Neurological Motor findings include:  no tremor, no twitching, no cramping(pre/post exercise), no atrophy  Neurological Coordination: unsteadiness and balance difficulties  Neurological Sensory:  no numbness, no pain, no tingling, does not fall when eyes closed or taking a shower  Neurological Gait:  no difficulty walking, not falling to one side, no sensation of being pushed, has not had falls  Active Problems    1  Acute deep vein thrombosis of lower limb, unspecified laterality   2  Aftercare following surgery (V58 89) (Z48 89)   3  Allergic rhinitis (477 9) (J30 9)   4  Analgesic use (V58 69) (Z79 899)   5  Anemia (285 9) (D64 9)   6  Arrhythmia (427 9) (I49 9)   7  Arthritis involving multiple sites (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 99) (M12 9)   8  Ataxia (781 3) (R27 0)   9  Atherosclerosis of arteries of extremities (440 20) (I70 209)   10  Benign essential hypertension (401 1) (I10)   11  Blood type O+ (V49 89) (Z67 40)   12  Callus (700) (L84)   13  Carrier Of Infectious Disease Staphylococcal (V02 59)   14  Cellulitis of digit (681 9) (L03 119)   15  Chronic pain syndrome (338 4) (G89 4)   16  Deformity of ankle and foot, acquired (736 70) (M21 969)   17  Diabetes mellitus with neuropathy (250 60,357 2) (E11 40)   18  Difficulty in walking (719 7) (R26 2)   19  Drug rash (693 0) (L27 0)   20  Dyspepsia (536 8) (K30)   21  Edema (782 3) (R60 9)   22  Encounter for screening for cardiovascular disorders (V81 2) (Z13 6)   23  Encounter for staple removal (V58 32) (Z48 02)   24  Facial pain (784 0) (R51)   25  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)   26  Gynecomastia (611 1) (N62)   27  Hyperglycemia (790 29) (R73 9)   28  Impacted cerumen of right ear (380 4) (H61 21)   29  Lump of left breast (611 72) (N63)   30  Malignant essential hypertension (401 0) (I10)   31  Malignant neoplasm of colon (153 9) (C18 9)   32  Medicare annual wellness visit, initial (V70 0) (Z00 00)   33  Need for prophylactic vaccination and inoculation against influenza (V04 81) (Z23)   34  Need for vaccination with 13-polyvalent pneumococcal conjugate vaccine (V03 82) (Z23)   35  Onychomycosis (110 1) (B35 1)   36  Open toe wound (893 0) (S91 109A)   37  Pacemaker (V45 01) (Z95 0)   38  Pain in both feet (729 5) (M79 671,M79 672)   39  Pain in extremity (729 5) (M79 609)   40  Pain with urination (788 1) (R30 9)   41  Primary hypothyroidism (244 9) (E03 9)   42  Screening for thyroid disorder (V77 0) (Z13 29)   43  Special screening examination for neoplasm of prostate (V76 44) (Z12 5)   44  Surgery, elective (V50 9) (Z41 9)   45  Trigeminal neuralgia (350 1) (G50 0)   46  Type 2 diabetes mellitus (250 00) (E11 9)   47  Urine frequency (788 41) (R35 0)   48  Venous insufficiency (459 81) (I87 2)    Past Medical History    1  History of Arthritis (V13 4)   2  History 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 (V02 54) (Z22 322)   3  History of Cellulitis (682 9) (L03 90)   4  Diabetes mellitus with neuropathy (250 60,357 2) (E11 40)   5  History of Gastritis Due To H  Pylori (535 50)   6  History of Hip pain, unspecified laterality   7  History of anemia (V12 3) (Z86 2)   8  History of cardiac pacemaker (V12 50,V45 01) (Z95 0)   9  History of edema (V13 89) (Z87 898)   10  History of hypertension (V12 59) (Z86 79)   11  History of hypothyroidism (V12 29) (Z86 39)   12  History of Loss Of Hearing   13  History of Malignant Neoplasm Of The Ascending Colon (V10 05)   14  Need for prophylactic vaccination and inoculation against influenza (V04 81) (Z23)   15  History of Pneumonia (V12 61)   16  History of Prostate disorder (602 9) (N42 9)   17  History of Skin rash (782 1) (R21)   18  History of Trigeminal neuralgia (350 1) (G50 0)   19  History of Trigeminal neuralgia (350 1) (G50 0)   20  History of Type 2 diabetes mellitus (250 00) (E11 9)    Surgical History    1  History of Biopsy Breast Open   2  History of Cataract Surgery   3  History of Inguinal Hernia Repair   4  History of Intracranial Stereotactic Gamma Radiosurgery   5  History of Laparo Part Colectomy W/ Removal Terminal Ileum, Ileocolost   6  History of Laparoscopy Total Colectomy   7  History of Pacemaker Placement   8  History of Pacemaker Placement   9  History of Rhizotomy   10  History of Split-Thickness Autograft    Family History    1  Family history of Breast Cancer (V16 3)    2   Family history of Liver Cancer    3  Family history of Hypertension (V17 49)   4  Family history of Multiple Sclerosis    5  Family history of Son 1-1  At Age 61    8  Family history of Breast Cancer (V16 3)   7  Family history of Diabetes Mellitus (V18 0)   8  Family history of Heart Disease (V17 49)    9  Family history of Parkinson Disease    10  Denied: Family history of Adverse Reaction To Anesthesia   11  Denied: Family history of Bleeding   12  Denied: Family history of Colon Cancer   13  Denied: Family history of Crohn's Disease   14  Family history of Family Health Status Of 2nd Sister -    13  Family history of Family Health Status Of Sister -    12  Denied: Family history of Ulcerative Colitis    Social History    · Alcohol Use (History)   · Caffeine use (V49 89) (F15 90)   · Daily Coffee Consumption (1  Cups/Day)   · Denied: History of Drug Use   · Former smoker (V1 82) (Z87 891)   · Living With Adult Children   · Marital History - Currently    · Occupation: Retired    Current Meds   1  AmLODIPine Besylate 5 MG Oral Tablet; TAKE 1 TABLET TWICE DAILY  Requested for:   02HIP8978; Last Rx:2015 Ordered   2  CarBAMazepine  MG Oral Capsule Extended Release 12 Hour; one tab in am,   one tab at noon and two at bedtime; Therapy: 80QPI8769 to (Last Rx:2016)  Requested for: 49WBW7337 Ordered   3  Levothyroxine Sodium 75 MCG Oral Tablet; TAKE 1 TABLET DAILY  Requested for:   39ROL2757; Last Rx:43Ubd3642 Ordered   4  Lidocaine 5 % External Ointment; APPLY TO AFFECTED AREAS AS DIRECTED; Therapy: 51FBB4304 to (Evaluate:2015)  Requested for: 14UKD6547; Last   J72OHV3251 Ordered   5  Lisinopril 5 MG Oral Tablet; Take 1 tablet daily  Requested for: 94NFZ8389; Last   Rx:2015 Ordered   6  Metoprolol Succinate  MG Oral Tablet Extended Release 24 Hour; TAKE ONE (1)   TABLET(S) DAILY; Therapy: 00OYZ4309 to ([facility])  Requested for: 37ENX5055;  Last Rx:29Dqo1739 Ordered   7  OxyCODONE HCl - 5 MG Oral Tablet; take 1 tablet every 4-6 hours as needed for pain; Therapy: 02Apr2015 to (Last Rx:30Apr2015) Ordered   8  Oxycodone-Acetaminophen 5-325 MG Oral Tablet; TAKE 1 TO 2 TABLETS EVERY 4   HOURS AS NEEDED FOR PAIN;   Therapy: 82Csc6951 to (Last Rx:30Apr2015) Ordered   9  Stool Softener 100 MG Oral Capsule; TAKE 1 CAPSULE TWICE DAILY  Prn;   Therapy: (Recorded:39Tby3742) to Recorded    Allergies    1  Augmentin TABS   2  Penicillins   3  Clindamycin HCl CAPS   4  Dilantin CAPS   5  Levaquin TABS   6  TEGretol TABS    Vitals  Signs [Data Includes: Current Encounter]   Recorded: [id number] 11:19AM   Heart Rate: 80  Systolic: 665  Diastolic: 85  Weight: 376 lb 0 8 oz  BMI Calculated: 25 07  BSA Calculated: 1 63    Physical Exam    Constitutional   General appearance: No acute distress, well appearing and well nourished  Musculoskeletal   Gait and station: Abnormal   Gait evaluation demonstrated a wide-based and ataxic gait and stooping  Muscle strength: Normal strength throughout  Neurologic   2nd cranial nerve: Normal     3rd, 4th, and 6th cranial nerves: Normal     5th cranial nerve: Normal     7th cranial nerve: Normal     8th cranial nerve: Normal     9th cranial nerve: Normal     11th cranial nerve: Normal     12th cranial nerve: Normal     Sensation: Normal     Reflexes: Normal     Coordination: Abnormal   Coordination: impaired balance, positive Romberg's sign and past-pointing present        Future Appointments    Date/Time Provider Specialty Site   03/02/2016 10:30 AM Lela Venegas [facility] [address] [facility]     Signatures   Electronically signed by : Mariah Glaser MD; Feb 1 2016  4:47PM EST                       (Author)

## 2018-01-13 VITALS
HEART RATE: 80 BPM | WEIGHT: 138 LBS | SYSTOLIC BLOOD PRESSURE: 166 MMHG | RESPIRATION RATE: 16 BRPM | HEIGHT: 62 IN | DIASTOLIC BLOOD PRESSURE: 70 MMHG | OXYGEN SATURATION: 98 % | BODY MASS INDEX: 25.4 KG/M2

## 2018-01-13 VITALS — WEIGHT: 138 LBS | BODY MASS INDEX: 25.4 KG/M2 | HEART RATE: 80 BPM | RESPIRATION RATE: 16 BRPM | HEIGHT: 62 IN

## 2018-01-13 VITALS
BODY MASS INDEX: 25.58 KG/M2 | DIASTOLIC BLOOD PRESSURE: 62 MMHG | WEIGHT: 139 LBS | SYSTOLIC BLOOD PRESSURE: 138 MMHG | RESPIRATION RATE: 16 BRPM | HEART RATE: 82 BPM | HEIGHT: 62 IN

## 2018-01-14 VITALS
BODY MASS INDEX: 25.58 KG/M2 | SYSTOLIC BLOOD PRESSURE: 195 MMHG | HEIGHT: 62 IN | WEIGHT: 139 LBS | DIASTOLIC BLOOD PRESSURE: 85 MMHG

## 2018-01-14 VITALS — DIASTOLIC BLOOD PRESSURE: 75 MMHG | SYSTOLIC BLOOD PRESSURE: 176 MMHG | TEMPERATURE: 98.2 F

## 2018-01-14 NOTE — RESULT NOTES
Verified Results  (1) TSH 46Ete4315 08:35AM Loren Ran Order Number: [id number]     Test Name Result Flag Reference   TSH 4 710 uIU/mL H 0 358-3 740   Patients undergoing fluorescein dye angiography may retain small amounts of fluorescein in the body for 48-72 hours post procedure  Samples containing fluorescein can produce falsely depressed TSH values  If the patient had this procedure,a specimen should be resubmitted post fluorescein clearance  (1) T4, FREE 42Unv7845 08:35AM Loren Ran Order Number: [id number]     Test Name Result Flag Reference   T4,FREE 0 70 ng/dL L 0 76-1 46   Specimen collection should occur prior to Sulfasalazine administration due to the potential for falsely elevated results

## 2018-01-15 NOTE — RESULT NOTES
Verified Results  (1) TSH 46Wvy1507 09:33AM Stevphen Gowers    Order Number: [id number]     Test Name Result Flag Reference   TSH 3 860 uIU/mL H 0 358-3 740   Patients undergoing fluorescein dye angiography may retain small amounts of fluorescein in the body for 48-72 hours post procedure  Samples containing fluorescein can produce falsely depressed TSH values  If the patient had this procedure,a specimen should be resubmitted post fluorescein clearance  (1) T4, FREE 36Ekq6144 09:33AM [facility] Order Number: [id number]     Test Name Result Flag Reference   T4,FREE 0 68 ng/dL L 0 76-1 46       Plan  Primary hypothyroidism    · Levothyroxine Sodium 137 MCG Oral Tablet; TAKE 1 TABLET DAILY   · (1) T4, FREE; Status:Active; Requested for:89Usi4487;    · (1) TSH; Status:Active;  Requested for:66Wwm1780;

## 2018-01-15 NOTE — PROGRESS NOTES
Assessment    1  Mixed stress and urge urinary incontinence (788 33) (N39 46)   2  Fecal soiling due to fecal incontinence (787 60) (R15 9)   3  Anemia (285 9) (D64 9)   4  Benign essential hypertension (401 1) (I10)   5  Edema (782 3) (R60 9)   6  Elevated liver enzymes (790 5) (R74 8)   7  Peripheral vascular disease (443 9) (I73 9)   8  Primary hypothyroidism (244 9) (E03 9)   9  Trigeminal neuralgia (350 1) (G50 0)   10  Atherosclerosis of arteries of extremities (440 20) (I70 209)   11  Impaired fasting glucose (790 21) (R73 01)   12  Encounter for preventive health examination (V70 0) (Z00 00)    Plan  Anemia, Benign essential hypertension, Edema, Elevated liver enzymes, Fecal soiling  due to fecal incontinence, Mixed stress and urge urinary incontinence, Peripheral  vascular disease, Primary hypothyroidism, Trigeminal neuralgia    · (1) CBC/PLT/DIFF; Status:Active; Requested for:22May2017;    · (1) COMPREHENSIVE METABOLIC PANEL; Status:Active; Requested for:22May2017;    · (1) T4, FREE; Status:Active; Requested for:22May2017;    · (1) TEGRETOL(CARBAMAZEPINE); Status:Active; Requested for:22May2017;    · (1) TSH; Status:Active; Requested for:22May2017; Atherosclerosis of arteries of extremities    · (1) LIPID PANEL, FASTING; Status:Active; Requested for:22May2017;   Benign essential hypertension, Venous insufficiency    · Metoprolol Succinate  MG Oral Tablet Extended Release 24 Hour; TAKE  ONE TABLET BY MOUTH IN THE MORNING & 1/2 IN THE EVENING  Fecal soiling due to fecal incontinence, Mixed stress and urge urinary incontinence    · Tolterodine Tartrate ER 2 MG Oral Capsule Extended Release 24 Hour; take 1  capsule daily  Impaired fasting glucose    · (1) HEMOGLOBIN A1C; Status:Active;  Requested for:22May2017;   PMH: Malignant essential hypertension    · AmLODIPine Besylate 5 MG Oral Tablet; TAKE 1 TABLET TWICE DAILY    Discussion/Summary    - Subsequent annual wellness visit completed  -Hypertension is well controlled on metoprolol, amlodipine and lisinopril  He fills provided of amlodipine and metoprolol   -Patient will follow-up with cardiologist as scheduled for history of cardiovascular disease  -Patient remains on carbamazepine for history of trigeminal neuralgia  Refill not necessary at this time  Check Tegretol level with copies going to neurologist office  Follow-up with neurologist as scheduled  -Daughter will check on the dose of levothyroxine is taken and call the office  If he is taking the 112 Âµg dosage that he can have labs completed tomorrow if not then he will need to be on the 112 Âµg dosage for at least 2 weeks before having labs done  -We'll check the patient's fasting lipid profile  I would not treat aggressively as he is 80years old but a very functional 80year-old   -Discussed urinary and fecal incontinence  Does not sound as though the patient is truly incontinent  He is having some mild symptoms where he is not making to the bathroom in time in the morning  Trial on a low dose of Detrol LA 2 mg at bedtime   -Patient did have impaired fasting glucose while on dexamethasone  We'll check hemoglobin A1c along with CMT  -Patient is current on all immunizations  -Patient will follow-up in the office in 6 months for reevaluation of chronic conditions  Impression: Subsequent Annual Wellness Visit, with preventive exam as well as age and risk appropriate counseling completed  Cardiovascular screening and counseling: screening is current  Diabetes screening and counseling: due for blood glucose  Colorectal cancer screening and counseling: screening not indicated  Prostate cancer screening and counseling: screening not indicated  Osteoporosis screening and counseling: screening not indicated  Abdominal aortic aneurysm screening and counseling: screening not indicated  Glaucoma screening and counseling: screening is current and Dx - V80 1 Screen for Glaucoma     HIV screening and counseling: screening not indicated  Immunizations: influenza vaccine is up to date this year and Td vaccination up to date  Advance Directive Planning: complete and up to date, he was encouraged to follow-up with me to discuss his questions and/or decisions  Advice and education were given regarding fall risk reduction  He was referred to None  Medical Equipment/Suppliers: None  Patient Discussion: plan discussed with the patient, plan discussed with the patient's family, follow-up visit needed in 3-4 months, 30 minute visit, greater than half of the time was spent on counseling  Chief Complaint  Staple Removal, Ear Wax Removal and Annual Wellness Visit      History of Present Illness  HPI: 80-year-old male presents with his daughter for sunscreen annual wellness visit and follow-up of chronic conditions  Patient does continue to have decreased hearing  He was recently at audiologist  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  He continues to have difficulty with ambulation  He is ambulating with cane  Daughter does note that he is continent through the night and in the morning he does have difficulty making it to the bathroom  Sometimes he will have some mild fecal incontinence and urinary incontinence  Patient does have history of colon 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  Patient does follow-up with neurologist for treatment of trigeminal neuralgia  He does receive Botox injections  Patient recently had his dose of levothyroxine increased but daughter is uncertain if he has been taking the 80 Âµg first is the 100 Âµg dose  Patient does follow-up with his cardiologist  He does have pacemaker in place  Welcome to [employer] and Wellness Visits: The patient is being seen for the subsequent annual wellness visit     Medicare Screening and Risk Factors   Hospitalizations: he has been previously hospitalizied, he has been hospitalized 1 times and The patient was in the E R  for a fall  Once per lifetime medicare screening tests: AAA screening [place] has not yet been done  Medicare Screening Tests Risk Questions   Abdominal aortic aneurysm risk assessment: over 72years of age  Osteoporosis risk assessment: none indicated  HIV risk assessment: none indicated  Drug and Alcohol Use: The patient has never smoked cigarettes  The patient reports never drinking alcohol  He has never used illicit drugs  Diet and Physical Activity: Current diet includes well balanced meals, low carbohydrate food choices, 1 servings of fruit per day, 1 servings of vegetables per day, 1-2 servings of meat per day, 1 servings of whole grains per day, 0 servings of simple carbohydrates per day, 1-2 servings of dairy products per day, 1 cups of coffee per day, 0 cups of tea per day, 0 cans of regular soda per day and 0 cans of diet soda per day  He exercises infrequently  Exercise: walking 20 minutes per day  Mood Disorder and Cognitive Impairment Screening: PHQ-9 Depression Scale   Depression screening  negative for symptoms  He denies feeling down, depressed, or hopeless over the past two weeks  He denies feeling little interest or pleasure in doing things over the past two weeks  Cognitive impairment screening: denies difficulty learning/retaining new information, denies difficulty handling complex tasks, denies difficulty with reasoning, denies difficulty with spatial ability and orientation, denies difficulty with language and denies difficulty with behavior  Functional Ability/Level of Safety: Hearing is normal in the right ear, slightly decreased in the left ear and a hearing aid is used  He reports hearing difficulties  The patient is currently unable to drive, but able to do activities of daily living without limitations and able to participate in social activities without limitations   Activities of daily living details: transportation help needed, needs help doing housework and needs help managing money, but does not need help using the phone, does not need help shopping, no meal preparation help needed, does not need help doing laundry and does not need help managing medications  The most recent fall occurred at home  The fall resulted from tripping  The fall was preceded by no known event  The fall resulted in abrasion(s) to Scalp  Home safety risk factors:  poor household lighting, but no unfamiliar surroundings, no loose rugs, no uneven floors, no household clutter, grab bars in the bathroom and handrails on the stairs  Further Evaluation: The patient will turn on the lights before entering a dark room  Advance Directives: Advance directives: living will, durable power of  for health care directives, advance directives and The patient's daughter is the durable POA for Health Care  end of life decisions were reviewed with the patient and I disagree with the patient's decisions  Co-Managers and Medical Equipment/Suppliers: See Patient Care Team   Additional Information: None  Reviewed Updated Aly Viera:   Last Medicare Wellness Visit Information was reviewed, patient interviewed, no change since last AW  Preventive Quality Program 65 and Older: Falls Risk: The patient fell 1 times in the past 12 months  The most recent fall occurred at home  The fall resulted from tripping  The fall was preceded by no known event  The fall resulted in abrasion(s) to Scalp  The patient is currently asymptomatic Symptoms Include:  Associated symptoms:  No associated symptoms are reported  Current treatment includes acetaminophen  By report, there is good compliance with treatment, good tolerance of treatment and good symptom control  Pertinent medical history includes:  peripheral neuropathy  The patient began experiencing falls 2 week(s) ago  Currently, the patient lives at home with family  Risk factors include:  poor lighting  He was previously evaluated in the emergency room 2 week(s) ago  Previous presentation included a recent fall  Past evaluation has included head CT  This problem has not been previously treated  The patient currently has no urinary incontinence symptoms  Date of last glaucoma screen was The patient has an appointment with an eye doctor this year  Hospital Based Practices Required Assessment:   Pain Assessment   the patient states they have pain  The pain is located in the Scalp  The pain radiates to the None  The patient describes the pain as dull  Prefered Language is  [place]  Primary Language is  English  Patient Care Team    Care Team Member Role Specialty Office Number   Lourdes Mirza MD  Vascular Surgery [phone]   Cherrie Benavides MD Specialist Vascular Surgery [phone]   Clif Espino Specialist Neurosurgery [phone]   Nicole Mario MD Referring Neurology [phone]   [facility]  Neurosurgery [phone]   [address] DO  Pain Management [phone]   Enoch Mathews MD  Cardiology [phone]     Review of Systems    Constitutional: no fever, no chills and no malaise  Cardiovascular: negative  Respiratory: negative  Gastrointestinal: as noted in HPI  Genitourinary: urinary incontinence, but as noted in HPI, no dysuria, no urinary frequency, no urinary urgency, no urinary hesitancy and no nocturia  Musculoskeletal: generalized muscle aches and joint stiffness  Integumentary and Breasts: negative  Neurological: headache  Psychiatric: negative  Endocrine: negative  Hematologic and Lymphatic: negative  Over the past 2 weeks, how often have you been bothered by the following problems? 1 ) Little interest or pleasure in doing things? Not at all    2 ) Feeling down, depressed or hopeless? Not at all    3 ) Trouble falling asleep or sleeping too much? Not at all    4 ) Feeling tired or having little energy? Not at all    5 ) Poor appetite or overeating?  Not at all    6 ) Feeling bad about yourself, or that you are a failure, or have let yourself or your family down? Not at all    7 ) Trouble concentrating on things, such as reading a newspaper or watching television? Not at all    8 ) Moving or speaking so slowly that other people could have noticed, or the opposite, moving or speaking faster than usual? Not at all  How difficult have these problems made it for you to do your work, take care of things at home, or get along with people? Not at all  Score 0      Active Problems    1  Anemia (285 9) (D64 9)   2  Aortic stenosis, moderate (424 1) (I35 0)   3  Arrhythmia (427 9) (I49 9)   4  Arthritis involving multiple sites (716 99) (M12 9)   5  Atherosclerosis of arteries of extremities (440 20) (I70 209)   6  Benign essential hypertension (401 1) (I10)   7  Blood type O+ (V49 89) (Z67 40)   8  Body mass index (BMI) 24 0-24 9, adult (V85 1) (Z68 24)   9  Callus (700) (L84)   10  Chronic pain syndrome (338 4) (G89 4)   11  Dizziness (780 4) (R42)   12  Dyspepsia (536 8) (K30)   13  Edema (782 3) (R60 9)   14  Elevated liver enzymes (790 5) (R74 8)   15  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)   16  Gynecomastia (611 1) (N62)   17  High risk medication use (V58 69) (Z79 899)   18  Malignant neoplasm of colon (153 9) (C18 9)   19  Multiple falls (V15 88) (R29 6)   20  Need for prophylactic vaccination and inoculation against influenza (V04 81) (Z23)   21  Onychomycosis (110 1) (B35 1)   22  Pacemaker (V45 01) (Z95 0)   23  Pain in both feet (729 5) (M79 671,M79 672)   24  Peripheral vascular disease (443 9) (I73 9)   25  Primary hypothyroidism (244 9) (E03 9)   26  Screening for genitourinary condition (V81 6) (Z13 89)   27  Trigeminal neuralgia (350 1) (G50 0)   28   Venous insufficiency (459 81) (I87 2)    Past Medical History    · History of Acute deep vein thrombosis of lower limb, unspecified laterality   · History of Aftercare following surgery (V58 89) (Z48 89)   · History of Analgesic use (V58 69) (Z79 899)   · History of Arthritis (V13 4)   · History of Ataxia (781 3) (R27 0)   · History of Callus (700) (L84)   · History of Carrier Of Infectious Disease Staphylococcal (V02 59)   · History 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 (V02 54) (Z22 322)   · History of Cellulitis (682 9) (L03 90)   · History of Cellulitis of digit (681 9) (L03 119)   · History of Deformity of ankle and foot, acquired (736 70) (M21 969)   · History of Difficulty in walking (719 7) (R26 2)   · History of Encounter for screening for cardiovascular disorders (V81 2) (Z13 6)   · History of Encounter for staple removal (V58 32) (Z48 02)   · History of Facial pain (784 0) (R51)   · History of Gastritis Due To H  Pylori (535 50)   · History of Hip pain, unspecified laterality   · History of allergic rhinitis (V12 69) (Z87 09)   · History of anemia (V12 3) (Z86 2)   · History of cardiac pacemaker (V12 50,V45 01) (Z95 0)   · History of drug rash (V13 3) (Z87 2)   · History of edema (V13 89) (U12 680)   · History of hyperglycemia (V12 29) (Z86 39)   · History of hypertension (V12 59) (Z86 79)   · History of hypothyroidism (V12 29) (Z86 39)   · History of onychomycosis (V12 09) (Z86 19)   · History of type 2 diabetes mellitus (V12 29) (Z86 39)   · History of urinary frequency (V13 09) (L99 797)   · History of Impacted cerumen of right ear (380 4) (H61 21)   · History of Loss Of Hearing   · History of Lump of left breast (611 72) (N63)   · History of Malignant essential hypertension (401 0) (I10)   · History of Malignant Neoplasm Of The Ascending Colon (V10 05)   · History of Medicare annual wellness visit, initial (V70 0) (Z00 00)   · Need for prophylactic vaccination and inoculation against influenza (V04 81) (Z23)   · History of Need for vaccination with 13-polyvalent pneumococcal conjugate vaccine  (V03 82) (Z23)   · History of Open toe wound (893 0) (S91 109A)   · History of Pain in both feet (729 5) (M79 671,M79 672)   · History of Pain in both feet (729 5) (M79 671,M79 672)   · History of Pain in both feet (729 5) (M79 671,M79 672)   · History of Pain in extremity (729 5) (M79 609)   · History of Pain with urination (788 1) (R30 9)   · History of Pneumonia (V12 61)   · History of Prostate disorder (602 9) (N42 9)   · History of Screening for thyroid disorder (V77 0) (Z13 29)   · History of Skin rash (782 1) (R21)   · History of Special screening examination for neoplasm of prostate (V76 44) (Z12 5)   · History of Surgery, elective (V50 9) (Z41 9)   · History of Trigeminal neuralgia (350 1) (G50 0)   · History of Trigeminal neuralgia (350 1) (G50 0)   · History of Type 2 diabetes mellitus (250 00) (E11 9)    The active problems and past medical history were reviewed and updated today  Surgical History    · History of Biopsy Breast Open   · History of Cataract Surgery   · History of Inguinal Hernia Repair   · History of Intracranial Stereotactic Gamma Radiosurgery   · History of Laparo Part Colectomy W/ Removal Terminal Ileum, Ileocolost   · History of Laparoscopy Total Colectomy   · History of Pacemaker Placement   · History of Pacemaker Placement   · History of Rhizotomy   · History of Split-Thickness Autograft    The surgical history was reviewed and updated today         Family History  Mother    · Family history of Breast Cancer (V16 3)  Father    · Family history of Liver Cancer  Daughter    · Family history of Hypertension (V17 49)   · Family history of Multiple Sclerosis  Son    · Family history of Son 1-1  At Age 61  Sister    · Family history of Breast Cancer (V16 3)   · Family history of Diabetes Mellitus (V18 0)   · Family history of Heart Disease (V17 49)  Brother    · Family history of Parkinson Disease  Family History    · Denied: Family history of Adverse Reaction To Anesthesia   · Denied: Family history of Bleeding   · Denied: Family history of Colon Cancer   · Denied: Family history of Crohn's Disease   · Family history of Family Health Status Of 2nd Sister -    · Family history of Family Health Status Of Sister -    · Denied: Family history of Ulcerative Colitis    The family history was reviewed and updated today  Social History    · Alcohol Use (History)   · 1 glass of wine daily   · Caffeine use (V49 89) (F15 90)   · Daily Coffee Consumption (1  Cups/Day)   · Denied: History of Drug Use   · Former smoker (V15 82) (O10 508)   · previous cigar use many years ago   · Living With Adult Children   · Marital History - Currently    · Occupation: Retired  The social history was reviewed and is unchanged  Current Meds   1  AmLODIPine Besylate 5 MG Oral Tablet; TAKE 1 TABLET TWICE DAILY  Requested for:   [id number]; Last Rx:2015 Ordered   2  Aspirin 81 MG Oral Tablet Chewable; Therapy: (Recorded:2016) to Recorded   3  CarBAMazepine  MG Oral Capsule Extended Release 12 Hour; one tab in am,   one tab at noon and two at bedtime; Therapy: 59CTG6802 to (Last Rx:2017)  Requested for: 85RYY8872 Ordered   4  Dexamethasone 1 MG Oral Tablet; 1 daily x 3 days; Therapy: 35GIR9849 to (Last Rx:2017)  Requested for: 97GFW3335 Ordered   5  Levothyroxine Sodium 112 MCG Oral Tablet; TAKE ONE TABLET BY MOUTH EVERY   MORNING; Therapy: 13WSH6359 to (Evaluate:60Oyn3441)  Requested for: 11KEI6159; Last   Rx:05Ede6314 Ordered   6  Lisinopril 5 MG Oral Tablet; Take 1 tablet daily  Requested for: 57Vtj4916; Last   Rx:76Dmz7633 Ordered   7  Metoprolol Succinate  MG Oral Tablet Extended Release 24 Hour; 1 Q AM AND   1/2 PILL HS; Therapy: 42XYO0850 to Recorded   8  Stool Softener 100 MG Oral Capsule; TAKE 1 CAPSULE TWICE DAILY  Prn;   Therapy: (Recorded:58Yjg4693) to Recorded    The medication list was reviewed and updated today  Allergies    1  Augmentin TABS   2  Penicillins   3  Clindamycin HCl CAPS   4  Dilantin CAPS   5  Levaquin TABS   6  TEGretol TABS    Immunizations   ** Printed in Appendix #1 below  Vitals  Signs    Temperature: 96 8 F  Heart Rate: 84  Respiration: 18  Systolic: 559  Diastolic: 62  Height: 5 ft 2 in  Weight: 139 lb   BMI Calculated: 25 42  BSA Calculated: 1 64  O2 Saturation: 97    Physical Exam    Constitutional   General appearance: No acute distress, well appearing and well nourished  Head and Face   Head and face: Normal     Palpation of the face and sinuses: No sinus tenderness  Eyes   Conjunctiva and lids: No erythema, swelling or discharge  Pupils and irises: Equal, round, reactive to light  Ears, Nose, Mouth, and Throat   External inspection of ears and nose: Normal     Otoscopic examination: Abnormal   The right external canal did not have a cerumen impaction  The left external canal had a cerumen impaction  Cerumen impaction in the left ear canal    Nasal mucosa, septum, and turbinates: Normal without edema or erythema  Lips, teeth, and gums: Normal, good dentition  Oropharynx: Normal with no erythema, edema, exudate or lesions  Neck   Neck: Supple, symmetric, trachea midline, no masses  Pulmonary   Respiratory effort: No increased work of breathing or signs of respiratory distress  Auscultation of lungs: Clear to auscultation  Cardiovascular   Palpation of heart: Normal PMI, no thrills  Auscultation of heart: Normal rate and rhythm, normal S1 and S2, no murmurs  Peripheral vascular exam: Normal     Examination of extremities for edema and/or varicosities: Normal     Abdomen   Abdomen: Non-tender, no masses  Liver and spleen: No hepatomegaly or splenomegaly  Lymphatic   Palpation of lymph nodes in neck: No lymphadenopathy  Musculoskeletal   Gait and station: Normal     Inspection/palpation of digits and nails: Normal without clubbing or cyanosis      Inspection/palpation of joints, bones, and muscles: Normal     Stability: Normal     Muscle strength/tone: Normal  Neurologic   Cranial nerves: Cranial nerves 2-12 intact  Cortical function: Normal mental status  Reflexes: 2+ and symmetric  Psychiatric   Judgment and insight: Normal     Orientation to person, place and time: Normal     Recent and remote memory: Intact  Mood and affect: Normal        Results/Data  Falls Risk Assessment (Dx Z13 89 Screen for Neurologic Disorder) [id number] 09:10AM User, Ahs     Test Name Result Flag Reference   Falls Risk      No falls in the past year       Health Management  Malignant neoplasm of colon   COLONOSCOPY; every 3 years; Last 2012; Next Permanently Deferred;      Future Appointments    Date/Time Provider Specialty Site   2017 10:15 AM Claire Sam DPM Podiatry [facility]   2017 10:30 AM Marcia Bowens MD Neurology [facility] 21     Signatures   Electronically signed by : Angélica Mejia DO; May 22 2017 10:03AM EST                       (Author)    Appendix #1     Patient: Dilma Rangel ; : 1919; MRN: [id number]      1 2 3 4 5 6    Influenza  07-Nov-2007 18-Oct-2008 10-Nov-2010 28-Oct-2011 05-Oct-2012 22-Oct-2013    [facility]  10-Dec-2014

## 2018-01-16 NOTE — RESULT NOTES
Discussion/Summary   Hypothyroidism is slightly worse  We'll need increase dosage of levothyroxine  Remainder of labs are acceptable  Verified Results  (1) CBC/PLT/DIFF [id number] 07:06AM [employer] Staff   TW Order Number: [id number]     Test Name Result Flag Reference   WBC COUNT 7 77 Thousand/uL  4 31-10 16   RBC COUNT 4 13 Million/uL  3 88-5 62   HEMOGLOBIN 12 5 g/dL  12 0-17 0   HEMATOCRIT 36 8 %  36 5-49 3   MCV 89 fL  82-98   MCH 30 3 pg  26 8-34 3   MCHC 34 0 g/dL  31 4-37 4   RDW 14 1 %  11 6-15 1   MPV 11 8 fL  8 9-12 7   PLATELET COUNT 420 Thousands/uL L 149-390   nRBC AUTOMATED 0 /100 WBCs     NEUTROPHILS RELATIVE PERCENT 60 %  43-75   LYMPHOCYTES RELATIVE PERCENT 30 %  14-44   MONOCYTES RELATIVE PERCENT 7 %  4-12   EOSINOPHILS RELATIVE PERCENT 3 %  0-6   BASOPHILS RELATIVE PERCENT 0 %  0-1   NEUTROPHILS ABSOLUTE COUNT 4 62 Thousands/? ??L  1 85-7 62   LYMPHOCYTES ABSOLUTE COUNT 2 35 Thousands/? ??L  0 60-4 47   MONOCYTES ABSOLUTE COUNT 0 56 Thousand/? ??L  0 17-1 22   EOSINOPHILS ABSOLUTE COUNT 0 22 Thousand/? ??L  0 00-0 61   BASOPHILS ABSOLUTE COUNT 0 01 Thousands/? ??L  0 00-0 10     (1) COMPREHENSIVE METABOLIC PANEL [id number] 68:29SR [employer] Staff   TW Order Number: [id number]     Test Name Result Flag Reference   SODIUM 136 mmol/L  136-145   POTASSIUM 4 8 mmol/L  3 5-5 3   CHLORIDE 101 mmol/L  100-108   CARBON DIOXIDE 29 mmol/L  21-32   ANION GAP (CALC) 6 mmol/L  4-13   BLOOD UREA NITROGEN 33 mg/dL H 5-25   CREATININE 1 24 mg/dL  0 60-1 30   Standardized to IDMS reference method   CALCIUM 8 9 mg/dL  8 3-10 1   BILI, TOTAL 0 35 mg/dL  0 20-1 00   ALK PHOSPHATAS 163 U/L H    ALT (SGPT) 24 U/L  12-78   AST(SGOT) 20 U/L  5-45   ALBUMIN 3 9 g/dL  3 5-5 0   TOTAL PROTEIN 7 4 g/dL  6 4-8 2   eGFR Non-African American 53 8 ml/min/1 73sq m     National Kidney Disease Education Program recommendations are as follows:  GFR calculation is accurate only with a steady state creatinine  Chronic Kidney disease less than 60 ml/min/1 73 sq  meters  Kidney failure less than 15 ml/min/1 73 sq  meters  GLUCOSE FASTING 158 mg/dL H 65-99     (1) TSH [id number] 07:06AM [employer] Order Number: [id number]     Test Name Result Flag Reference   TSH 6 680 uIU/mL H 0 358-3 740   Patients undergoing fluorescein dye angiography may retain small amounts of fluorescein in the body for 48-72 hours post procedure  Samples containing fluorescein can produce falsely depressed TSH values  If the patient had this procedure,a specimen should be resubmitted post fluorescein clearance  (1) T4, FREE 06Jun2017 07:06AM [employer] Order Number: [id number]     Test Name Result Flag Reference   T4,FREE 0 59 ng/dL L 0 76-1 46     (1) TEGRETOL(CARBAMAZEPINE) [id number] 07:06AM [facility] Order Number: [id number]     Test Name Result Flag Reference   CARBAMAZEPINE 11 3 ug/mL  4 0-12 0     (1) LIPID PANEL, FASTING 06Jun2017 07:06AM [employer] Order Number: [id number]     Test Name Result Flag Reference   CHOLESTEROL 198 mg/dL     HDL,DIRECT 51 mg/dL  40-60   Specimen collection should occur prior to Metamizole administration due to the potential for falsely depressed results  LDL CHOLESTEROL CALCULATED 128 mg/dL H 0-100   Triglyceride:         Normal              <150 mg/dl       Borderline High    150-199 mg/dl       High               200-499 mg/dl       Very High          >499 mg/dl  Cholesterol:         Desirable        <200 mg/dl      Borderline High  200-239 mg/dl      High             >239 mg/dl  HDL Cholesterol:        High    >59 mg/dL      Low     <41 mg/dL  LDL CALCULATED:    This screening LDL is a calculated result  It does not have the accuracy of the Direct Measured LDL in the monitoring of patients with hyperlipidemia and/or statin therapy  Direct Measure LDL (NUF628) must be ordered separately in these patients  TRIGLYCERIDES 97 mg/dL  <=150   Specimen collection should occur prior to N-Acetylcysteine or Metamizole administration due to the potential for falsely depressed results  (1) HEMOGLOBIN A1C [id number] 07:06AM Skip Crane    Order Number: [id number]     Test Name Result Flag Reference   HEMOGLOBIN A1C 5 7 %  4 2-6 3   EST  AVG   GLUCOSE 117 mg/dl

## 2018-01-22 VITALS — TEMPERATURE: 98.2 F | SYSTOLIC BLOOD PRESSURE: 140 MMHG | DIASTOLIC BLOOD PRESSURE: 72 MMHG

## 2018-01-22 VITALS
HEIGHT: 62 IN | DIASTOLIC BLOOD PRESSURE: 62 MMHG | WEIGHT: 139 LBS | OXYGEN SATURATION: 97 % | SYSTOLIC BLOOD PRESSURE: 148 MMHG | BODY MASS INDEX: 25.58 KG/M2 | RESPIRATION RATE: 18 BRPM | TEMPERATURE: 96.8 F | HEART RATE: 84 BPM

## 2018-01-23 VITALS — SYSTOLIC BLOOD PRESSURE: 137 MMHG | DIASTOLIC BLOOD PRESSURE: 69 MMHG | HEART RATE: 109 BPM

## 2018-01-24 VITALS — SYSTOLIC BLOOD PRESSURE: 124 MMHG | HEART RATE: 77 BPM | DIASTOLIC BLOOD PRESSURE: 76 MMHG

## 2018-02-16 ENCOUNTER — APPOINTMENT (OUTPATIENT)
Dept: RADIOLOGY | Facility: CLINIC | Age: 83
End: 2018-02-16
Payer: MEDICARE

## 2018-02-16 ENCOUNTER — OFFICE VISIT (OUTPATIENT)
Dept: OBGYN CLINIC | Facility: CLINIC | Age: 83
End: 2018-02-16

## 2018-02-16 VITALS — HEART RATE: 92 BPM | DIASTOLIC BLOOD PRESSURE: 91 MMHG | SYSTOLIC BLOOD PRESSURE: 191 MMHG

## 2018-02-16 DIAGNOSIS — S72.001D CLOSED FRACTURE OF RIGHT HIP WITH ROUTINE HEALING, SUBSEQUENT ENCOUNTER: Primary | ICD-10-CM

## 2018-02-16 DIAGNOSIS — Z98.890 POST-OPERATIVE STATE: ICD-10-CM

## 2018-02-16 PROCEDURE — 73502 X-RAY EXAM HIP UNI 2-3 VIEWS: CPT

## 2018-02-16 PROCEDURE — 99024 POSTOP FOLLOW-UP VISIT: CPT | Performed by: ORTHOPAEDIC SURGERY

## 2018-03-13 ENCOUNTER — PROCEDURE VISIT (OUTPATIENT)
Dept: NEUROLOGY | Facility: CLINIC | Age: 83
End: 2018-03-13
Payer: MEDICARE

## 2018-03-13 VITALS — SYSTOLIC BLOOD PRESSURE: 158 MMHG | TEMPERATURE: 97.6 F | DIASTOLIC BLOOD PRESSURE: 60 MMHG

## 2018-03-13 DIAGNOSIS — G50.0 TRIGEMINAL NEURALGIA: Primary | Chronic | ICD-10-CM

## 2018-03-13 PROCEDURE — 64615 CHEMODENERV MUSC MIGRAINE: CPT | Performed by: PHYSICIAN ASSISTANT

## 2018-03-13 NOTE — PROGRESS NOTES
Chemodenervation  Date/Time: 3/13/2018 2:19 PM  Performed by: Moose Aguero  Authorized by: Moose Aguero     Pre-procedure details:     Prepped With: Alcohol    Procedure details:     Position:  Upright  Botox:     Botox Type:  Type A    Brand:  Botox    mL's of Botulinum Toxin:  75    Final Concentration per CC:  50 units    Needle Gauge:  30 G 2 5 inch  Procedures:     Botox Procedures comment:  Trigeminal neuralgia    Indications: orofacial dyskinesia and hemifacial spasm      Indications comment:  Trigeminal neuralgia    Date of last injection:  12/5/2017  Injection Location:    Head / Face:     R inferior cervical paraspinal injection amount:  10 unit(s)  Total Units:     Total units used:  75    Total units discarded:  25  Post-procedure details:     Chemodenervation:  Head or face (right trigeminal nerve)    Patient tolerance of procedure: Tolerated well, no immediate complications  Comments:        Distribution of Botox:  10 units in 2 injections in the right zygomaticus muscle  10 units in 2 injections in the right mentalis  10 units in 2 injections into the right platysma muscle  30 units in 4 injections into the right temporalis muscle (anterior and posterior to the right ear)  10 units in 2 injections was injected into the right masseter muscle  5 units in 1 injection into the right occipital muscle  All injections were medically necessary to treat his pain appropriately

## 2018-06-12 ENCOUNTER — TELEPHONE (OUTPATIENT)
Dept: NEUROLOGY | Facility: CLINIC | Age: 83
End: 2018-06-12

## 2018-06-12 NOTE — TELEPHONE ENCOUNTER
Patient's daughter calls back she states she has 6/14/18 written on her schedule for [facility] B[facility] appointment   We will be keeping the appointment on 6/14/18 at 10am

## 2018-06-12 NOTE — TELEPHONE ENCOUNTER
thereseom with Alejandrina Jordan, to confirm the patient's appointment  When she calls back please transfer the patient to [phone] to speak to me directly        Thanks

## 2018-06-14 ENCOUNTER — PROCEDURE VISIT (OUTPATIENT)
Dept: NEUROLOGY | Facility: CLINIC | Age: 83
End: 2018-06-14
Payer: MEDICARE

## 2018-06-14 VITALS — SYSTOLIC BLOOD PRESSURE: 160 MMHG | TEMPERATURE: 98.1 F | DIASTOLIC BLOOD PRESSURE: 70 MMHG

## 2018-06-14 DIAGNOSIS — G50.0 TRIGEMINAL NEURALGIA: Primary | Chronic | ICD-10-CM

## 2018-06-14 PROCEDURE — 64612 DESTROY NERVE FACE MUSCLE: CPT | Performed by: PHYSICIAN ASSISTANT

## 2018-06-14 NOTE — PROGRESS NOTES
Procedures      Vitals:    06/14/18 1005   BP: 160/70   Temp: 98 1 °F (36 7 °C)   TempSrc: Oral       Chemodenervation  Date/Time: 3/13/2018 2:19 PM  Performed by: Valente Cleary  Authorized by: Valente Cleary     Pre-procedure details:     Prepped With: Alcohol    Procedure details:     Position:  Upright  Botox:     Botox Type:  Type A    Brand:  Botox    mL's of Botulinum Toxin:  100 total, 50 used and 50 discarded    Final Concentration per CC:  50 units    Needle Gauge:  30 G 2 5 inch  Procedures:     Botox Procedures comment:  Trigeminal neuralgia    Indications comment:  Trigeminal neuralgia    Date of last injection:  3/13/18  Injection Location:    Head / Face  Total Units:     Total units used:  50    Total units discarded:  50  Post-procedure details:     Chemodenervation:  Head or face (right trigeminal nerve)    Patient tolerance of procedure: Tolerated well, no immediate complications  Comments:     Distribution of Botox:  15 units in 2 injections in the right zygomaticus muscle  5 units in 2 injections in the right mentalis  10 units in 2 injections was injected into the right masseter muscle  10 units in 3 injections into the right temporalis muscle (anterior and posterior to the right ear)  10 units in 1 injection into the right occipital muscle  All injections were medically necessary to treat his pain appropriately  On questioning, the patient may have had some difficulty swallowing hard foods, but very infrequently  I injected less Botox today to prevent worsening facial droop and possible dysphagia secondary to Botox  I asked the nurses at his residence to call me with any worsening dysphagia, speech or facial droop  Continue carbamazepine 300 mg 12 hour capsules:  1 cap at 6 a m , 1 cap at 1 p m  and 1 cap at 8 p m

## 2018-07-24 RX ORDER — LEVOTHYROXINE SODIUM 0.12 MG/1
125 TABLET ORAL EVERY MORNING
COMMUNITY

## 2018-07-24 RX ORDER — ASPIRIN 81 MG/1
81 TABLET ORAL EVERY MORNING
COMMUNITY

## 2018-07-24 NOTE — PRE-PROCEDURE INSTRUCTIONS
Pre-Surgery Instructions:   Medication Instructions    amLODIPine (NORVASC) 5 mg tablet Instructed patient per Anesthesia Guidelines   ascorbic acid (VITAMIN C) 500 MG tablet Instructed patient per Anesthesia Guidelines   aspirin (ECOTRIN LOW STRENGTH) 81 mg EC tablet Instructed patient per Anesthesia Guidelines   CARBAMAZEPINE PO Instructed patient per Anesthesia Guidelines   cloNIDine (CATAPRES-TTS-3) 0 3 mg/24 hr Instructed patient per Anesthesia Guidelines   docusate sodium (COLACE) 100 mg capsule Patient was instructed by Physician and understands   levothyroxine 125 mcg tablet Patient was instructed by Physician and understands   metoprolol succinate (TOPROL-XL) 100 mg 24 hr tablet Patient was instructed by Physician and understands   polyethylene glycol (MIRALAX) 17 g packet Patient was instructed by Physician and understands   senna (SENOKOT) 8 6 mg Patient was instructed by Physician and understands  Pre op instructions give/faxed to Jonah Jameson  Pt to be transported by same  Pt to take amlodipine,levothyroxine,maintain clonidine patch  My Surgical Experience    The following information was developed to assist you to prepare for your operation  What do I need to do before coming to the hospital?   Arrange for a responsible person to drive you to and from the hospital    Arrange care for your children at home  Children are not allowed in the recovery areas of the hospital   Plan to wear clothing that is easy to put on and take off  If you are having shoulder surgery, wear a shirt that buttons or zippers in the front  Bathing  o Shower the evening before and the morning of your surgery with an antibacterial soap   Please refer to the Pre Op Showering Instructions for Surgery Patients Sheet   o Remove nail polish and all body piercing jewelry  o Do not shave any body part for at least 24 hours before surgery-this includes face, arms, legs and upper body  Food  o Nothing to eat or drink after midnight the night before your surgery  This includes candy and chewing gum  o Exception: If your surgery is after 12:00pm (noon), you may have clear liquids such as 7-Up®, ginger ale, apple or cranberry juice, Jell-O®, water, or clear broth until 8:00 am  o Do not drink milk or juice with pulp on the morning before surgery  o Do not drink alcohol 24 hours before surgery  Medicine  o Follow instructions you received from your surgeon about which medicines you may take on the day of surgery  o If instructed to take medicine on the morning of surgery, take pills with just a small sip of water  Call your prescribing doctor for specific infroamtion on what to do if you take insulin    What should I bring to the hospital?    Bring:  Bunny Mandes or a walker, if you have them, for foot or knee surgery   A list of the daily medicines, vitamins, minerals, herbals and nutritional supplements you take  Include the dosages of medicines and the time you take them each day   Glasses, dentures or hearing aids   Minimal clothing; you will be wearing hospital sleepwear   Photo ID; required to verify your identity   If you have a Living Will or Power of , bring a copy of the documents   If you have an ostomy, bring an extra pouch and any supplies you use    Do not bring   Medicines or inhalers   Money, valuables or jewelry    What other information should I know about the day of surgery?  Notify your surgeons if you develop a cold, sore throat, cough, fever, rash or any other illness   Report to the Ambulatory Surgical/Same Day Surgery Unit   You will be instructed to stop at Registration only if you have not been pre-registered   Inform your  fi they do not stay that they will be asked by the staff to leave a phone number where they can be reached   Be available to be reached before surgery   In the event the operating room schedule changes, you may be asked to come in earlier or later than expected    *It is important to tell your doctor and others involved in your health care if you are taking or have been taking any non-prescription drugs, vitamins, minerals, herbals or other nutritional supplements   Any of these may interact with some food or medicines and cause a reaction

## 2018-07-30 ENCOUNTER — HOSPITAL ENCOUNTER (OUTPATIENT)
Facility: AMBULARY SURGERY CENTER | Age: 83
Setting detail: OUTPATIENT SURGERY
Discharge: NURSING, REHAB OR LONG-TERM CARE | End: 2018-07-30
Attending: OPHTHALMOLOGY | Admitting: OPHTHALMOLOGY
Payer: MEDICARE

## 2018-07-30 ENCOUNTER — ANESTHESIA (OUTPATIENT)
Dept: PERIOP | Facility: AMBULARY SURGERY CENTER | Age: 83
End: 2018-07-30
Payer: MEDICARE

## 2018-07-30 ENCOUNTER — ANESTHESIA EVENT (OUTPATIENT)
Dept: PERIOP | Facility: AMBULARY SURGERY CENTER | Age: 83
End: 2018-07-30
Payer: MEDICARE

## 2018-07-30 VITALS
HEIGHT: 65 IN | RESPIRATION RATE: 16 BRPM | TEMPERATURE: 97.9 F | WEIGHT: 137 LBS | OXYGEN SATURATION: 91 % | BODY MASS INDEX: 22.82 KG/M2 | HEART RATE: 70 BPM | DIASTOLIC BLOOD PRESSURE: 77 MMHG | SYSTOLIC BLOOD PRESSURE: 190 MMHG

## 2018-07-30 DIAGNOSIS — H25.11 AGE-RELATED NUCLEAR CATARACT OF RIGHT EYE: Primary | ICD-10-CM

## 2018-07-30 PROCEDURE — V2632 POST CHMBR INTRAOCULAR LENS: HCPCS | Performed by: OPHTHALMOLOGY

## 2018-07-30 DEVICE — IOL SN60WF 21.0: Type: IMPLANTABLE DEVICE | Site: EYE | Status: FUNCTIONAL

## 2018-07-30 RX ORDER — TOBRAMYCIN 3 MG/ML
SOLUTION/ DROPS OPHTHALMIC AS NEEDED
Status: DISCONTINUED | OUTPATIENT
Start: 2018-07-30 | End: 2018-07-30 | Stop reason: HOSPADM

## 2018-07-30 RX ORDER — CYCLOPENTOLATE HYDROCHLORIDE 10 MG/ML
1 SOLUTION/ DROPS OPHTHALMIC
Status: COMPLETED | OUTPATIENT
Start: 2018-07-30 | End: 2018-07-30

## 2018-07-30 RX ORDER — HYDRALAZINE HYDROCHLORIDE 20 MG/ML
5 INJECTION INTRAMUSCULAR; INTRAVENOUS AS NEEDED
Status: CANCELLED | OUTPATIENT
Start: 2018-07-30

## 2018-07-30 RX ORDER — TETRACAINE HYDROCHLORIDE 5 MG/ML
SOLUTION OPHTHALMIC AS NEEDED
Status: DISCONTINUED | OUTPATIENT
Start: 2018-07-30 | End: 2018-07-30 | Stop reason: HOSPADM

## 2018-07-30 RX ORDER — TOBRAMYCIN 3 MG/ML
1 SOLUTION/ DROPS OPHTHALMIC
Qty: 7.5 ML | Refills: 0
Start: 2018-07-30

## 2018-07-30 RX ORDER — LIDOCAINE HYDROCHLORIDE 10 MG/ML
INJECTION, SOLUTION EPIDURAL; INFILTRATION; INTRACAUDAL; PERINEURAL AS NEEDED
Status: DISCONTINUED | OUTPATIENT
Start: 2018-07-30 | End: 2018-07-30 | Stop reason: HOSPADM

## 2018-07-30 RX ORDER — LABETALOL HYDROCHLORIDE 5 MG/ML
INJECTION, SOLUTION INTRAVENOUS AS NEEDED
Status: DISCONTINUED | OUTPATIENT
Start: 2018-07-30 | End: 2018-07-30 | Stop reason: SURG

## 2018-07-30 RX ORDER — PHENYLEPHRINE HCL 2.5 %
1 DROPS OPHTHALMIC (EYE)
Status: COMPLETED | OUTPATIENT
Start: 2018-07-30 | End: 2018-07-30

## 2018-07-30 RX ORDER — TETRACAINE HYDROCHLORIDE 5 MG/ML
1 SOLUTION OPHTHALMIC ONCE
Status: COMPLETED | OUTPATIENT
Start: 2018-07-30 | End: 2018-07-30

## 2018-07-30 RX ORDER — KETOROLAC TROMETHAMINE 5 MG/ML
1 SOLUTION OPHTHALMIC
Status: COMPLETED | OUTPATIENT
Start: 2018-07-30 | End: 2018-07-30

## 2018-07-30 RX ORDER — BALANCED SALT SOLUTION 6.4; .75; .48; .3; 3.9; 1.7 MG/ML; MG/ML; MG/ML; MG/ML; MG/ML; MG/ML
SOLUTION OPHTHALMIC AS NEEDED
Status: DISCONTINUED | OUTPATIENT
Start: 2018-07-30 | End: 2018-07-30 | Stop reason: HOSPADM

## 2018-07-30 RX ORDER — PROPOFOL 10 MG/ML
INJECTION, EMULSION INTRAVENOUS AS NEEDED
Status: DISCONTINUED | OUTPATIENT
Start: 2018-07-30 | End: 2018-07-30 | Stop reason: SURG

## 2018-07-30 RX ORDER — LIDOCAINE HYDROCHLORIDE 20 MG/ML
1 JELLY TOPICAL
Status: COMPLETED | OUTPATIENT
Start: 2018-07-30 | End: 2018-07-30

## 2018-07-30 RX ORDER — SODIUM CHLORIDE 9 MG/ML
INJECTION, SOLUTION INTRAVENOUS CONTINUOUS PRN
Status: DISCONTINUED | OUTPATIENT
Start: 2018-07-30 | End: 2018-07-30 | Stop reason: SURG

## 2018-07-30 RX ADMIN — KETOROLAC TROMETHAMINE 1 DROP: 5 SOLUTION OPHTHALMIC at 08:41

## 2018-07-30 RX ADMIN — KETOROLAC TROMETHAMINE 1 DROP: 5 SOLUTION OPHTHALMIC at 08:26

## 2018-07-30 RX ADMIN — PHENYLEPHRINE HYDROCHLORIDE 1 DROP: 25 SOLUTION/ DROPS OPHTHALMIC at 08:41

## 2018-07-30 RX ADMIN — SODIUM CHLORIDE: 0.9 INJECTION, SOLUTION INTRAVENOUS at 09:03

## 2018-07-30 RX ADMIN — CYCLOPENTOLATE HYDROCHLORIDE 1 DROP: 10 SOLUTION/ DROPS OPHTHALMIC at 08:11

## 2018-07-30 RX ADMIN — SODIUM CHLORIDE: 0.9 INJECTION, SOLUTION INTRAVENOUS at 08:55

## 2018-07-30 RX ADMIN — LIDOCAINE HYDROCHLORIDE 1 APPLICATION: 20 JELLY TOPICAL at 08:41

## 2018-07-30 RX ADMIN — KETOROLAC TROMETHAMINE 1 DROP: 5 SOLUTION OPHTHALMIC at 08:11

## 2018-07-30 RX ADMIN — PHENYLEPHRINE HYDROCHLORIDE 1 DROP: 25 SOLUTION/ DROPS OPHTHALMIC at 08:48

## 2018-07-30 RX ADMIN — LIDOCAINE HYDROCHLORIDE 1 APPLICATION: 20 JELLY TOPICAL at 08:26

## 2018-07-30 RX ADMIN — LABETALOL HYDROCHLORIDE 5 MG: 5 INJECTION, SOLUTION INTRAVENOUS at 08:56

## 2018-07-30 RX ADMIN — TETRACAINE HYDROCHLORIDE 1 DROP: 5 SOLUTION OPHTHALMIC at 08:11

## 2018-07-30 RX ADMIN — CYCLOPENTOLATE HYDROCHLORIDE 1 DROP: 10 SOLUTION/ DROPS OPHTHALMIC at 08:41

## 2018-07-30 RX ADMIN — LIDOCAINE HYDROCHLORIDE 1 APPLICATION: 20 JELLY TOPICAL at 08:11

## 2018-07-30 RX ADMIN — PROPOFOL 10 MG: 10 INJECTION, EMULSION INTRAVENOUS at 09:04

## 2018-07-30 RX ADMIN — PHENYLEPHRINE HYDROCHLORIDE 1 DROP: 25 SOLUTION/ DROPS OPHTHALMIC at 08:11

## 2018-07-30 RX ADMIN — KETOROLAC TROMETHAMINE 1 DROP: 5 SOLUTION OPHTHALMIC at 08:48

## 2018-07-30 RX ADMIN — CYCLOPENTOLATE HYDROCHLORIDE 1 DROP: 10 SOLUTION/ DROPS OPHTHALMIC at 08:26

## 2018-07-30 RX ADMIN — CYCLOPENTOLATE HYDROCHLORIDE 1 DROP: 10 SOLUTION/ DROPS OPHTHALMIC at 08:48

## 2018-07-30 RX ADMIN — PHENYLEPHRINE HYDROCHLORIDE 1 DROP: 25 SOLUTION/ DROPS OPHTHALMIC at 08:26

## 2018-07-30 RX ADMIN — LIDOCAINE HYDROCHLORIDE 1 APPLICATION: 20 JELLY TOPICAL at 08:48

## 2018-07-30 NOTE — OP NOTE
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 [H25 011]    Post-Op Diagnosis Codes:     * Cortical age-related cataract of right eye [H25 011]    Procedure(s):  EXTRACTION EXTRACAPSULAR CATARACT PHACO INTRAOCULAR LENS (IOL)    Anesthesia Type:   IV Sedation with Anesthesia    Operative Indications:  Cortical age-related cataract of right eye [H25 011]  Decreased vision to 20/200  With problems worried about falling and reading  Pt requested cataract sx the right eye    Procedure and Technique:    Procedure Details     The patient was brought in the OR in stable condition and placed on the operative table  The right eye was prepped and draped in the usual sterile manner  Attention was directed to the right eye where a lid speculum was placed  A 2 4 mm clear corneal incision was made temperally  1/2 cc of 1% MPF Lidocaine was irrigated into the anterior chamber followed by viscoat  The side port incision was placed superiorly  The capsularrhexis was made and the nucleus was hydrodissected with BSS  The nucleus was then removed with the phaco handpiece followed by removal of the cortical material with the I/A handpiece  The capsular bag was then filled with Provisc  The IOL was folded and placed in to the capsular bag and centered well  The remaining Provisc was removed from the eye with the I/A  The wounds were hydrated with BSS and found to be water tight  The lid speculum was removed and 2 drops of tobramycin were placed over the cornea  A protective eye shield was taped over the eye and the patient went to PACU in stable condition  I will see the patient in the office tomorrow and the expected post op period is a few weeks         Complications: None        Disposition: PACU   Condition: Stable    SIGNATURE: Quoc De Jesus Maryann Ayers MD  DATE: July 30, 2018  TIME: 9:18 AM

## 2018-07-30 NOTE — ANESTHESIA PREPROCEDURE EVALUATION
Review of Systems/Medical History  Patient summary reviewed  Chart reviewed  No history of anesthetic complications     Cardiovascular  EKG reviewed, Pacemaker/AICD, Hypertension poorly controlled, Valvular heart disease , aortic valve stenosis and aortic regurgitation, Dysrhythmias , lela dysrhythmia, No angina , No ARNOLD,   Comment: ECHO:  SUMMARY     LEFT VENTRICLE:  Systolic function was normal by visual assessment  Ejection fraction was estimated to be 55 %  There were no regional wall motion abnormalities  There was mild concentric hypertrophy  Doppler parameters were consistent with abnormal left ventricular relaxation (grade 1 diastolic dysfunction)  Doppler parameters were consistent with elevated mean left atrial filling pressure      MITRAL VALVE:  There was very mild stenosis      AORTIC VALVE:  The valve was trileaflet  Leaflets exhibited normal thickness, calcification, and markedly reduced cuspal separation  There was mild stenosis  There was mild to moderate regurgitation      TRICUSPID VALVE:  There was mild regurgitation  Pulmonary artery systolic pressure was at the upper limits of normal      AORTA:  The root exhibited mild dilatation      HISTORY: PRIOR HISTORY: Cardiac pacemaker, HTN, Hyponatremia, colon cancer, Diabetes, CAD,Thyroid disease     PROCEDURE: The procedure was performed at the bedside  This was a routine study  The transthoracic approach was used  The study included complete 2D imaging, M-mode, complete spectral Doppler, and color Doppler  The heart rate was 68 bpm,  at the start of the study  Images were obtained from the parasternal, apical, subcostal, and suprasternal notch acoustic windows  Image quality was adequate      LEFT VENTRICLE: Size was normal  Systolic function was normal by visual assessment  Ejection fraction was estimated to be 55 %  There were no regional wall motion abnormalities  There was mild concentric hypertrophy   DOPPLER: Doppler  parameters were consistent with abnormal left ventricular relaxation (grade 1 diastolic dysfunction)  Doppler parameters were consistent with elevated mean left atrial filling pressure      RIGHT VENTRICLE: The size was normal  Systolic function was normal  DOPPLER: Systolic pressure was within the normal range      LEFT ATRIUM: The atrium was mildly dilated      RIGHT ATRIUM: Size was normal      MITRAL VALVE: There was annular calcification  Valve structure was normal  There was normal leaflet separation  No echocardiographic evidence for prolapse  DOPPLER: The transmitral velocity was within the normal range  There was very mild  stenosis  There was no regurgitation      AORTIC VALVE: The valve was trileaflet  Leaflets exhibited normal thickness, calcification, and markedly reduced cuspal separation  DOPPLER: Transaortic velocity was within the normal range  There was mild stenosis  There was mild to  moderate regurgitation      TRICUSPID VALVE: The valve structure was normal  There was normal leaflet separation  DOPPLER: The transtricuspid velocity was within the normal range  There was mild regurgitation  Pulmonary artery systolic pressure was at the upper  limits of normal  Estimated peak PA pressure was 38 mmHg      PULMONIC VALVE: Leaflets exhibited normal thickness, no calcification, and normal cuspal separation  DOPPLER: The transpulmonic velocity was within the normal range  There was no regurgitation      PERICARDIUM: There was no thickening   There was no pericardial effusion      AORTA: The root exhibited mild dilatation      PULMONARY ARTERY: The size was normal  The morphology appeared normal      MEASUREMENT TABLES     DOPPLER MEASUREMENTS  Aortic valve   (Reference normals)  Peak gradient   17 mmHg   (--)     SYSTEM MEASUREMENT TABLES     2D mode  AoR Diam 2D: 4 cm  LA Diam (2D): 3 6 cm  LA/Ao (2D): 0 9  FS (2D Teich): 29 6 %  IVSd (2D): 1 21 cm  LVDEV: 76 4 cm³  LVESV: 32 8 cm³  LVIDd(2D): 4 15 cm  LVISd (2D): 2 92 cm  LVOT Area 2D: 3 14 cm squared  LVPWd (2D): 1 22 cm  SV (Teich): 43 6 cm³     Apical four chamber  LVEF A4C: 55 %     Unspecified Scan Mode  JOSE Cont Eq (Peak Juwan): 1 23 cm squared  Dec  [place]; Regurgitant Flow: 2430 mm/s2  Max P mm(Hg)  V Max: 3150 mm/s  Vmax: 3150 mm/s  LVOT (VTI): 15 4 cm  LVOT Diam : 2 cm  LVOT Vmax: 809 mm/s  LVOT Vmax; Mean: 822 mm/s  Peak Grad ; Mean: 3 mm(Hg)  SV (LVOT): 48 cm³  VTI;Mean: 1 mm(Hg)  JOSE Cont Eq (VTI): 1 41 cm squared  MV Peak A Juwan: 1600 mm/s  MV Peak E Juwan  Mean: 775 mm/s  MVA (PHT): 2 93 cm squared  PHT: 75 ms  Max P mm(Hg)  V Max: 2280 mm/s  Vmax: 2520 mm/s  RA Area: 14 7 cm squared  RA Volume: 34 cm³  TAPSE: 1 7 cm     [facility]     Prepared and electronically signed by  Gisella Brower DO  Signed 01-Sep-2017 12:25:51,  Pulmonary  Negative pulmonary ROS        GI/Hepatic            Endo/Other  Diabetes well controlled type 2 , History of thyroid disease ,      GYN       Hematology   Musculoskeletal    Arthritis     Neurology   Psychology           Physical Exam    Airway    Mallampati score: III  TM Distance: >3 FB  Neck ROM: full     Dental   upper dentures and lower dentures,     Cardiovascular  Rhythm: regular, Rate: normal, Murmur,     Pulmonary  Breath sounds clear to auscultation,     Other Findings    Patient had sustained SVT upon examination at around 10 am  His Blood pressure was also elevated with systolic of 251 mmHg  Patient not complaining was chest pain or discomfort  Discussion with the cardiologist and hospitalist for optimization  I had a full discussion with dr Mayra Crooks about the risks of having this procedure late in the day  Anesthesia Plan  ASA Score- 4     Anesthesia Type- IV sedation with anesthesia with ASA Monitors  Additional Monitors:   Airway Plan:         Plan Factors-    Induction- intravenous      Postoperative Plan-     Informed Consent- Anesthetic plan and risks discussed with patient  Informed consent obtained in the presence of the nursing staff and his daughter  Risks included prolonged ICU stay, cardiac arrythmias, bleeding and death     Cancer [facility]) colon   Cardiac disease pacemaker   Diabetes mellitus ([facility] [address] )    Hypertension    Trigeminal neuralgia    Disease of thyroid gland    Arthritis

## 2018-07-30 NOTE — DISCHARGE INSTRUCTIONS
Dr Perfecto Orozco Cataract Instructions    Activity:     1  No Driving until instructed   2  Keep shield on until seen tomorrow except when administering drops   3  No heavy lifting   4  No water in eye     Diet:     1  Resume normal diet    Normal Symptoms:     1  Mild Headache   2  Scratchy or picky feeling around eye    Call the office if:     1  You have any questions or concerns   2  If eye pain is not relieved by extra strength tylenol    Office phone number:  [phone]      Next appointment:     1  See Dr Perfecto Orozco at his office tomorrow as scheduled      9:30  AM   2  Bring blue eye kit with you and eyedrops to the office    A new set of comprehensive instructions will be given and reviewed with you during your office visit tomorrow

## 2018-08-14 ENCOUNTER — DOCUMENTATION (OUTPATIENT)
Dept: NEUROLOGY | Facility: CLINIC | Age: 83
End: 2018-08-14

## 2018-09-24 ENCOUNTER — PROCEDURE VISIT (OUTPATIENT)
Dept: NEUROLOGY | Facility: CLINIC | Age: 83
End: 2018-09-24
Payer: MEDICARE

## 2018-09-24 VITALS — SYSTOLIC BLOOD PRESSURE: 172 MMHG | DIASTOLIC BLOOD PRESSURE: 68 MMHG | TEMPERATURE: 98.4 F

## 2018-09-24 DIAGNOSIS — G50.0 TRIGEMINAL NEURALGIA: Primary | Chronic | ICD-10-CM

## 2018-09-24 PROCEDURE — 64615 CHEMODENERV MUSC MIGRAINE: CPT | Performed by: PHYSICIAN ASSISTANT

## 2018-09-24 NOTE — PROGRESS NOTES
Chemodenervation  Date/Time: 9/24/2018 3:25 PM  Performed by: Regino Watson  Authorized by: Regino Watson     Pre-procedure details:     Prepped With: Alcohol    Procedure details:     Position:  Upright  Botox:     Botox Type:  Type A    Brand:  Botox    mL's of Botulinum Toxin:  50    Final Concentration per CC:  50 units    Needle Gauge:  30 G 2 5 inch  Procedures:     Botox Procedures comment:  Trigeminal neuralgia, right    Indications comment:  Trigeminal neuralgia, right  Injection Location:    Head / Face:     R inferior cervical paraspinal injection amount:  10 unit(s)  Total Units:     Total units used:  50    Total units discarded:  45  Post-procedure details:     Chemodenervation:  Chronic migraine    Facial Nerve Location[de-identified]  Bilateral facial nerve    Patient tolerance of procedure: Tolerated well, no immediate complications  Comments:      Distribution of Botox:  15 units in 2 injections in the right zygomaticus muscle  5 units in 2 injections in the right mentalis  10 units in 2 injections was injected into the right masseter muscle  10 units in 3 injections into the right temporalis muscle (anterior and posterior to the right ear)  10 units in 1 injection into the right occipital muscle  All injections were medically necessary to treat his pain appropriately      Continue carbamazepine 300 mg 12 hour capsules:  1 cap at 6 a m , 1 cap at 1 p m  and 1 cap at 8 p m              Vitals:    09/24/18 1525   BP: (!) 172/68   Temp: 98 4 °F (36 9 °C)

## 2018-09-25 NOTE — PROGRESS NOTES
Procedures    Chemodenervation  Date/Time: 9/24/2018 3:25 PM  Performed by: Dipika Sorenson  Authorized by: Dipika Sorenson     Pre-procedure details:     Prepped With: Alcohol    Procedure details:     Position:  Upright  Botox:     Botox Type:  Type A    Brand:  Botox    mL's of Botulinum Toxin:  100    Final Concentration per CC:  50 units    Needle Gauge:  30 G 2 5 inch  Procedures:     Botox Procedures comment:  Trigeminal neuralgia, right    Indications comment:  Trigeminal neuralgia, right  Total Units:     Total units used:  50    Total units discarded:  50  Post-procedure details:     Patient tolerance of procedure: Tolerated well, no immediate complications  Comments:      Distribution of Botox:  15 units in 2 injections in the right zygomaticus muscle  5 units in 2 injections in the right mentalis  10 units in 2 injections was injected into the right masseter muscle  10 units in 3 injections into the right temporalis muscle (anterior and posterior to the right ear)  10 units in 1 injection into the right occipital muscle  All injections were medically necessary to treat his pain appropriately      Continue carbamazepine 300 mg 12 hour capsules:  1 cap at 6 a m , 1 cap at 1 p m  and 1 cap at 8 p m          Vitals:    09/24/18 1525   BP: (!) 172/68   Temp: 98 4 °F (36 9 °C)

## 2018-12-04 ENCOUNTER — DOCUMENTATION (OUTPATIENT)
Dept: NEUROLOGY | Facility: CLINIC | Age: 83
End: 2018-12-04

## 2018-12-04 NOTE — PROGRESS NOTES
Per  Anusha Ashraf- please use Botox 100 units- our stock for patient's upcoming appointment on 1/3/19

## 2019-01-03 ENCOUNTER — PROCEDURE VISIT (OUTPATIENT)
Dept: NEUROLOGY | Facility: CLINIC | Age: 84
End: 2019-01-03
Payer: MEDICARE

## 2019-01-03 VITALS — DIASTOLIC BLOOD PRESSURE: 79 MMHG | SYSTOLIC BLOOD PRESSURE: 181 MMHG | TEMPERATURE: 98.1 F | HEART RATE: 77 BPM

## 2019-01-03 DIAGNOSIS — G50.0 TRIGEMINAL NEURALGIA: Primary | Chronic | ICD-10-CM

## 2019-01-03 PROCEDURE — 64615 CHEMODENERV MUSC MIGRAINE: CPT | Performed by: PHYSICIAN ASSISTANT

## 2019-01-03 NOTE — PROGRESS NOTES
Procedures  Chemodenervation  Date/Time: 1/3/19, 15:15  Performed by: Howard Schrader  Authorized by: Howard Schrader   Pre-procedure details:     Prepped With: Alcohol    Procedure details:     Position:  Upright  Botox:     Botox Type:  Type A    Brand:  Botox    mL's of Botulinum Toxin:  100    Final Concentration per CC:  50 units    Needle Gauge:  30 G 2 5 inch  Procedures:     Botox Procedures comment:  Trigeminal neuralgia, right    Indications comment:  Trigeminal neuralgia, right  Total Units:     Total units used:  65    Total units discarded:  35  Post-procedure details:     Patient tolerance of procedure: Tolerated well, no immediate complications  Comments:      Distribution of Botox:  15 units in 3 injections in the right zygomaticus muscle  10 units in 2 injections in the right mentalis  5 units in 2 injections in the left mentalis (has pain there now, new since last seen)  15 units in 2 injections was injected into the right masseter muscle  10 units in 3 injections into the right temporalis muscle (anterior and posterior to the right ear)  10 units in 1 injection into the right occipital muscle  All injections were medically necessary to treat his pain      2 cc per 100 units used  Next time will plan for 1 cc per 100 units  Vitals:    01/03/19 1441   BP: (!) 181/79   Pulse: 77   Temp: 98 1 °F (36 7 °C)       Note:  · Continue carbamazepine 300 mg 12 hour capsules:  1 cap at 6 a m , 1 cap at 1 p m  and 1 cap at 8 p m   · I wrote a progress note addressed to his nursing home that he should have his caregiver call with worsening pain, especially leading up to the next Botox injection during the wear off time    We will send either tramadol or baclofen, or even a steroid taper which I prefer to avoid at high doses unless absolutely necessary since he has elevated glucose

## 2019-02-11 ENCOUNTER — TELEPHONE (OUTPATIENT)
Dept: NEUROLOGY | Facility: CLINIC | Age: 84
End: 2019-02-11

## 2019-02-11 NOTE — TELEPHONE ENCOUNTER
Spoke to Livier Carranza at the residence at which patient Roderick Cho lives in regards to r/s upcoming botox appt with Lc Doran on 4/5/19  Offered 4/4/19 at 10 AM    Per Livier Carranza, she will contact patient's daughter Anup Stone and verify that the new time and date and call our office back  If either Livier Carranza or daughter Anup Salmon calls back, please transfer  Thank you

## 2019-02-15 NOTE — TELEPHONE ENCOUNTER
Spoke to patients daughter Rosie Vargas   Verified new date/time for her father's botox appointment on 4/4

## 2019-03-13 ENCOUNTER — DOCUMENTATION (OUTPATIENT)
Dept: NEUROLOGY | Facility: CLINIC | Age: 84
End: 2019-03-13

## 2019-03-13 NOTE — PROGRESS NOTES
General 03/13/2019  2:14 PM Burnie Venus care coordination  -   Note    Botox- no authorization needed  Please use our stock         Thank you!

## 2019-04-04 ENCOUNTER — PROCEDURE VISIT (OUTPATIENT)
Dept: NEUROLOGY | Facility: CLINIC | Age: 84
End: 2019-04-04
Payer: MEDICARE

## 2019-04-04 VITALS — SYSTOLIC BLOOD PRESSURE: 164 MMHG | DIASTOLIC BLOOD PRESSURE: 68 MMHG | HEART RATE: 73 BPM | TEMPERATURE: 98.8 F

## 2019-04-04 DIAGNOSIS — G50.0 TRIGEMINAL NEURALGIA: Primary | ICD-10-CM

## 2019-04-04 PROCEDURE — 64612 DESTROY NERVE FACE MUSCLE: CPT | Performed by: PHYSICIAN ASSISTANT

## 2019-04-04 RX ORDER — BACLOFEN 10 MG/1
TABLET ORAL
Qty: 30 TABLET | Refills: 0 | Status: SHIPPED | OUTPATIENT
Start: 2019-04-04

## 2019-06-11 ENCOUNTER — TELEPHONE (OUTPATIENT)
Dept: NEUROLOGY | Facility: CLINIC | Age: 84
End: 2019-06-11

## 2023-08-22 NOTE — PROGRESS NOTES
Assessment/Plan:  1  Closed fracture of right hip with routine healing, subsequent encounter       Adrienne Starks is here 3 months status post right hip cephalomedullary nailing and is doing extremely well  He denies any lateral hip or groin pain with ambulation on a daily basis  He is ambulating with a walker and states he feels great and is pleased with his postoperative course and recovery  At this point he may continue weight-bearing as tolerated with activities as tolerated with an assistive device for ambulation such as his walker  His x-rays demonstrate a healed right hip intertrochanteric fracture with stable fixation using a short TFN  At this point knee is doing well and I would like to see him back in approximately 9 months time on the anniversary of his surgery for repeat clinical evaluation and x-rays of his right hip  The patient may call the office at anytime if any questions or concerns should arise  Subjective:  3 month follow-up status post right hip TFN  Patient ID: Lolly Swartz is a 80 y o  male  HPI  Dom is here 3 months status post right hip cephalomedullary nailing for intertrochanteric fracture  He is doing extremely well and continues to deny any activity-related or weight-bearing pain  He states that he does have some intermittent groin pain however it is quick and self-limiting and infrequent in nature  He is ambulating with a walker and states that he is pleased with the progress of his postoperative course thus far  Review of Systems   Constitutional: Negative for chills, fever and unexpected weight change  HENT: Negative for hearing loss, nosebleeds and sore throat  Eyes: Negative for pain, redness and visual disturbance  Respiratory: Negative for cough, shortness of breath and wheezing  Cardiovascular: Negative for chest pain, palpitations and leg swelling  Gastrointestinal: Negative for abdominal pain, nausea and vomiting     Endocrine: Negative for polyphagia and polyuria  Genitourinary: Negative for dysuria and hematuria  Musculoskeletal: Negative for back pain and myalgias  See HPI   Skin: Negative for rash and wound  Neurological: Negative for dizziness, numbness and headaches  Psychiatric/Behavioral: Negative for decreased concentration and suicidal ideas  The patient is not nervous/anxious  Past Medical History:   Diagnosis Date    Arthritis     Cancer [facility])     colon    Cardiac disease     pacemaker    Diabetes mellitus ([address] )     Disease of thyroid gland     Hypertension     Trigeminal neuralgia        Past Surgical History:   Procedure Laterality Date    APPENDECTOMY      CARDIAC PACEMAKER PLACEMENT  2012    CATARACT EXTRACTION      COLON SURGERY  2013    HERNIA REPAIR      ID OPEN RX FEMUR FX+INTRAMED  Right 11/24/2017    Procedure: INSERTION NAIL IM FEMUR ANTEGRADE (TROCHANTERIC); Surgeon: Elizabeth Moncada DO;  Location: [facility];  Service: Orthopedics    SKIN GRAFT         No family history on file  Social History     Occupational History    Not on file       Social History Main Topics    Smoking status: Never Smoker    Smokeless tobacco: Never Used    Alcohol use No    Drug use: No    Sexual activity: Not on file         Current Outpatient Prescriptions:     amLODIPine (NORVASC) 5 mg tablet, Take 5 mg by mouth, Disp: , Rfl:     ascorbic acid (VITAMIN C) 500 MG tablet, Take 1 tablet by mouth 2 (two) times a day, Disp: , Rfl: 0    aspirin 81 mg chewable tablet, Chew, Disp: , Rfl:     CARBAMAZEPINE PO, Take 300 mg by mouth 3 (three) times a day  , Disp: , Rfl:     docusate sodium (COLACE) 100 mg capsule, Take 1 capsule by mouth 2 (two) times a day, Disp: 10 capsule, Rfl: 0    enoxaparin (LOVENOX) 30 mg/0 3 mL, Inject 0 3 mL under the skin daily for 42 days, Disp: , Rfl: 0    insulin glargine (LANTUS) 100 units/mL subcutaneous injection, Inject 8 Units under the skin daily at bedtime, Disp: 10 mL, Rfl: 0   insulin lispro (HumaLOG) 100 units/mL injection, Inject 1-5 Units under the skin 4 (four) times a day (before meals and at bedtime), Disp: , Rfl: 0    levothyroxine 137 mcg tablet, 0 075 mg  , Disp: , Rfl:     LISINOPRIL PO, Take 5 mg by mouth daily at bedtime  , Disp: , Rfl:     metoprolol succinate (TOPROL-XL) 100 mg 24 hr tablet, Take by mouth, Disp: , Rfl:     polyethylene glycol (MIRALAX) 17 g packet, Take 17 g by mouth daily as needed (Constipation), Disp: 14 each, Rfl: 0    senna (SENOKOT) 8 6 mg, Take 1 tablet by mouth daily at bedtime as needed for constipation, Disp: 120 each, Rfl: 0    Allergies   Allergen Reactions    Amoxicillin-Pot Clavulanate     Clindamycin Other (See Comments)    Dilantin  [Phenytoin Sodium Extended] Other (See Comments)    Levaquin [Levofloxacin]     Penicillins        Objective:  Vitals:    02/16/18 0903   BP: (!) 191/91   Pulse: 92       There is no height or weight on file to calculate BMI  Right Knee Exam     Other   Other tests: effusion present      Right Hip Exam     Tenderness   The patient is experiencing no tenderness  Range of Motion   Flexion: normal   Internal Rotation: abnormal   External Rotation: abnormal   Abduction: normal   Adduction: normal     Other   Scars: present  Sensation: normal  Pulse: present    Comments:  Lateral hip incisions well healed over right hip  No tenderness palpation  Internal and external rotation of the femur motion is decreased however there is no pain in the groin with passive range of motion  Neurovascular intact, all compartments soft          Observations     Right Knee   Positive for effusion  Physical Exam   Constitutional: He is oriented to person, place, and time  He appears well-developed  HENT:   Head: Atraumatic  Eyes: EOM are normal    Neck: Neck supple  Cardiovascular: Normal rate  Pulmonary/Chest: Effort normal    Abdominal: Soft     Musculoskeletal:        Right knee: He exhibits effusion  See orthopedic exam   Neurological: He is alert and oriented to person, place, and time  Skin: Skin is warm and dry  Psychiatric: He has a normal mood and affect  Nursing note and vitals reviewed  I have personally reviewed pertinent films in PACS  X-rays of the right hip and pelvis reviewed by me demonstrate stable fixation of a right hip healed intertrochanteric fracture with a short TFN  There has been no change in position of the hardware and the fracture appears to be well-healed at this point  Skip NELSON    Division of Adult Reconstruction  Department of [facility] [place] Counseling: I discussed with the patient the risks of guselkumab including but not limited to immunosuppression, serious infections, and drug reactions.  The patient understands that monitoring is required including a PPD at baseline and must alert us or the primary physician if symptoms of infection or other concerning signs are noted.

## (undated) DEVICE — B-H IRRIGATING CAN 19GA FLAT ANGLED 8MM: Brand: OPHTHALMIC CANNULA

## (undated) DEVICE — FABRIC REINFORCED, SURGICAL GOWN, XL: Brand: CONVERTORS

## (undated) DEVICE — CAST PADDING 4 IN SYNTHETIC NON-STRL

## (undated) DEVICE — ACTIVE FMS W/ INTREPID* ULTRA SLEEVES, 0.9MM 45° ABS* INTREPID* BALANCED TIP: Brand: ALCON

## (undated) DEVICE — COBAN 4 IN STERILE

## (undated) DEVICE — REPEL LITE CUT REST SURGICAL GLV LNRS X-LG: Brand: REPEL

## (undated) DEVICE — PACK GENERAL LF

## (undated) DEVICE — DRESSING MEPILEX AG BORDER 4 X 8 IN

## (undated) DEVICE — DRESSING MEPILEX AG BORDER 4 X 4 IN

## (undated) DEVICE — THE MONARCH® "D" CARTRIDGE IS A SINGLE-USE POLYPROPYLENE CARTRIDGE FOR POSTERIOR CHAMBER IOL DELIVERY: Brand: MONARCH® III

## (undated) DEVICE — CLEARCUT® SLIT KNIFE INTREPID MICRO-COAXIAL SYSTEM 2.4 SB: Brand: CLEARCUT®; INTREPID

## (undated) DEVICE — GLOVE INDICATOR PI UNDERGLOVE SZ 8.5 BLUE

## (undated) DEVICE — SUT VICRYL 2-0 CT-1 27 IN J259H

## (undated) DEVICE — 0.9MM MICROSMOOTH NULTRA INFUSION SLEEVE KIT: Brand: INFINIT, MICROSMOOTH, ALCON

## (undated) DEVICE — CHLORAPREP HI-LITE 26ML ORANGE

## (undated) DEVICE — SPONGE LAP 18 X 18 IN

## (undated) DEVICE — 3M™ TEGADERM™ TRANSPARENT FILM DRESSING FRAME STYLE, 1626W, 4 IN X 4-3/4 IN (10 CM X 12 CM), 50/CT 4CT/CASE: Brand: 3M™ TEGADERM™

## (undated) DEVICE — GLOVE SRG BIOGEL 7.5

## (undated) DEVICE — GLOVE SRG BIOGEL 8.5

## (undated) DEVICE — PREP SURGICAL PURPREP 26ML

## (undated) DEVICE — SUT VICRYL 0 CP-1 27 IN J267H

## (undated) DEVICE — ASTOUND SURGICAL GOWN, XXX LARGE, X-LONG: Brand: CONVERTORS

## (undated) DEVICE — EYE PADS 1 5/8"X2 5/8": Brand: MCKESSON

## (undated) DEVICE — OCCLUSIVE GAUZE STRIP,3% BISMUTH TRIBROMOPHENATE IN PETROLATUM BLEND: Brand: XEROFORM

## (undated) DEVICE — AIR INJECT CANNULA 27GA: Brand: OPHTHALMIC CANNULA

## (undated) DEVICE — Device

## (undated) DEVICE — DRAPE ISOLATION

## (undated) DEVICE — EYE PACK CUSTOM -FINNEGAN

## (undated) DEVICE — BASIC DOUBLE BASIN 2-LF: Brand: MEDLINE INDUSTRIES, INC.

## (undated) DEVICE — PROXIMATE SKIN STAPLERS (35 WIDE) CONTAINS 35 STAINLESS STEEL STAPLES (FIXED HEAD): Brand: PROXIMATE